# Patient Record
Sex: FEMALE | Race: WHITE | Employment: FULL TIME | URBAN - METROPOLITAN AREA
[De-identification: names, ages, dates, MRNs, and addresses within clinical notes are randomized per-mention and may not be internally consistent; named-entity substitution may affect disease eponyms.]

---

## 2017-01-05 ENCOUNTER — OFFICE VISIT (OUTPATIENT)
Dept: FAMILY MEDICINE | Facility: CLINIC | Age: 57
End: 2017-01-05
Payer: COMMERCIAL

## 2017-01-05 VITALS
BODY MASS INDEX: 24.75 KG/M2 | WEIGHT: 110 LBS | HEIGHT: 56 IN | HEART RATE: 82 BPM | DIASTOLIC BLOOD PRESSURE: 69 MMHG | TEMPERATURE: 97.9 F | SYSTOLIC BLOOD PRESSURE: 115 MMHG

## 2017-01-05 DIAGNOSIS — F41.1 GENERALIZED ANXIETY DISORDER: ICD-10-CM

## 2017-01-05 DIAGNOSIS — R73.09 ELEVATED GLUCOSE: ICD-10-CM

## 2017-01-05 DIAGNOSIS — R63.5 WEIGHT GAIN: Primary | ICD-10-CM

## 2017-01-05 LAB
GLUCOSE SERPL-MCNC: 195 MG/DL (ref 70–99)
TSH SERPL DL<=0.005 MIU/L-ACNC: 2.23 MU/L (ref 0.4–4)

## 2017-01-05 PROCEDURE — 84443 ASSAY THYROID STIM HORMONE: CPT | Mod: 90 | Performed by: FAMILY MEDICINE

## 2017-01-05 PROCEDURE — 99214 OFFICE O/P EST MOD 30 MIN: CPT | Performed by: FAMILY MEDICINE

## 2017-01-05 PROCEDURE — 36415 COLL VENOUS BLD VENIPUNCTURE: CPT | Performed by: FAMILY MEDICINE

## 2017-01-05 PROCEDURE — 99000 SPECIMEN HANDLING OFFICE-LAB: CPT | Performed by: FAMILY MEDICINE

## 2017-01-05 PROCEDURE — 82947 ASSAY GLUCOSE BLOOD QUANT: CPT | Mod: 90 | Performed by: FAMILY MEDICINE

## 2017-01-05 RX ORDER — FLUOXETINE 10 MG/1
10 CAPSULE ORAL DAILY
Qty: 30 CAPSULE | Refills: 1 | Status: SHIPPED | OUTPATIENT
Start: 2017-01-05 | End: 2017-03-06

## 2017-01-05 ASSESSMENT — ANXIETY QUESTIONNAIRES
7. FEELING AFRAID AS IF SOMETHING AWFUL MIGHT HAPPEN: NOT AT ALL
5. BEING SO RESTLESS THAT IT IS HARD TO SIT STILL: SEVERAL DAYS
GAD7 TOTAL SCORE: 9
2. NOT BEING ABLE TO STOP OR CONTROL WORRYING: MORE THAN HALF THE DAYS
6. BECOMING EASILY ANNOYED OR IRRITABLE: SEVERAL DAYS
1. FEELING NERVOUS, ANXIOUS, OR ON EDGE: MORE THAN HALF THE DAYS
3. WORRYING TOO MUCH ABOUT DIFFERENT THINGS: MORE THAN HALF THE DAYS

## 2017-01-05 ASSESSMENT — PATIENT HEALTH QUESTIONNAIRE - PHQ9: 5. POOR APPETITE OR OVEREATING: SEVERAL DAYS

## 2017-01-05 NOTE — PATIENT INSTRUCTIONS
MyFitnessPal or Lose it are two apps for your phone that are good for helping you track calories/exercise to help with weight loss.    Go to once a day on the buspar and then taper further if needed.      Start the fluoxetine 10 mg daily and see me in 1 month    Consider nutrition referral at [place] - check with insurance on coverage    Consider fitness classes to increase activity/calorie burning    Heather Mckenzie M.D.

## 2017-01-05 NOTE — Clinical Note
[facility]  [address]  [facility] [address]  Phone: [phone]    January 5, 2017    China Mckenzie  [address] [address] [facility] [address]              Dear Ms. Mckenzie,    Blood sugar elevated at 195.  Needs to return for a HgbA1c which measures three month average of blood sugar looking for diabetes.  This was a non-fasting specimen.       Component      Latest Ref Rng 1/5/2017   TSH      0.40 - 4.00 mU/L 2.23   Glucose      70 - 99 mg/dL 195 (H)           Sincerely,      Heather Mckenzie MD/ tg

## 2017-01-05 NOTE — NURSING NOTE
"Chief Complaint   Patient presents with     Weight Problem     weight gain     Medication Therapy Management       Initial /69 mmHg  Pulse 82  Temp(Src) 97.9  F (36.6  C) (Tympanic)  Ht 4' 8\" (1.422 m)  Wt 110 lb (49.896 kg)  BMI 24.68 kg/m2 Estimated body mass index is 24.68 kg/(m^2) as calculated from the following:    Height as of this encounter: 4' 8\" (1.422 m).    Weight as of this encounter: 110 lb (49.896 kg).  BP completed using cuff size: regular  Lesley Cassidy / Certified Medical Assistant......1/5/2017 9:48 AM    "

## 2017-01-05 NOTE — PROGRESS NOTES
"  SUBJECTIVE:                                                    China Mckenzie is a 56 year old female who presents to clinic today for the following health issues:      Anxiety Follow-Up    Status since last visit: Improved     Other associated symptoms:None    Complicating factors:   Significant life event: No   Current substance abuse: None  Depression symptoms: No  RON-7 SCORE 10/31/2014 12/14/2015 4/13/2016   Total Score 12 - -   Total Score - 11 5        GAD7         Amount of exercise or physical activity: 2-3 days/week for an average of 30-45 minutes    Problems taking medications regularly: No- but when tired can forget    Medication side effects: weight gain with the buspar  Diet: regular (no restrictions)    Patient is also wondering about her weight gain which she thinks is associated with her anxiety med, she would like to change medication maybe to prozac.    Body mass index is 24.68 kg/(m^2).    /69 mmHg  Pulse 82  Temp(Src) 97.9  F (36.6  C) (Tympanic)  Ht 4' 8\" (1.422 m)  Wt 110 lb (49.896 kg)  BMI 24.68 kg/m2  EXAM: GENERAL APPEARANCE ADULT: Alert, no acute distress  PSYCH: mentation appears normal., affect and mood normal    PHQ-9 SCORE 12/14/2015 4/13/2016 11/23/2016   Total Score - - -   Total Score 8 9 0     RON-7 SCORE 12/14/2015 4/13/2016 1/5/2017   Total Score - - -   Total Score 11 5 9         ASSESSMENT/PLAN:      ICD-10-CM    1. Weight gain R63.5 NUTRITION REFERRAL     TSH with free T4 reflex     Glucose   2. Generalized anxiety disorder F41.1 FLUoxetine (PROZAC) 10 MG capsule       Patient Instructions   MyFitnessPal or Lose it are two apps for your phone that are good for helping you track calories/exercise to help with weight loss.    Go to once a day on the buspar and then taper further if needed.      Start the fluoxetine 10 mg daily and see me in 1 month    Consider nutrition referral at [place] - check with insurance on coverage    Consider fitness classes to " increase activity/calorie burning    Heather Mckenzie M.D.

## 2017-01-06 ASSESSMENT — ANXIETY QUESTIONNAIRES: GAD7 TOTAL SCORE: 9

## 2017-01-31 ENCOUNTER — TELEPHONE (OUTPATIENT)
Dept: FAMILY MEDICINE | Facility: CLINIC | Age: 57
End: 2017-01-31

## 2017-01-31 NOTE — TELEPHONE ENCOUNTER
"Reason for call:  Patient reporting a symptom    Symptom or request: uti    Duration (how long have symptoms been present): this weekend    Have you been treated for this before? No    Additional comments: pt calling stating she thinks she has a uti    Phone Number patient can be reached at:  {[email or web address]:[phone]}    Best Time:  ***    Can we leave a detailed message on this number:  { :[phone]::\"YES\"}    Call taken on 1/31/2017 at 2:03 PM by Roz Mccray    "

## 2017-02-01 ENCOUNTER — OFFICE VISIT (OUTPATIENT)
Dept: FAMILY MEDICINE | Facility: CLINIC | Age: 57
End: 2017-02-01
Payer: COMMERCIAL

## 2017-02-01 VITALS
SYSTOLIC BLOOD PRESSURE: 119 MMHG | BODY MASS INDEX: 25.19 KG/M2 | TEMPERATURE: 97.9 F | HEIGHT: 56 IN | OXYGEN SATURATION: 96 % | HEART RATE: 79 BPM | DIASTOLIC BLOOD PRESSURE: 63 MMHG | WEIGHT: 112 LBS

## 2017-02-01 DIAGNOSIS — R82.90 NONSPECIFIC FINDING ON EXAMINATION OF URINE: Primary | ICD-10-CM

## 2017-02-01 DIAGNOSIS — R30.0 DYSURIA: ICD-10-CM

## 2017-02-01 LAB
ALBUMIN UR-MCNC: NEGATIVE MG/DL
APPEARANCE UR: ABNORMAL
BACTERIA #/AREA URNS HPF: ABNORMAL /HPF
BILIRUB UR QL STRIP: NEGATIVE
COLOR UR AUTO: YELLOW
GLUCOSE UR STRIP-MCNC: NEGATIVE MG/DL
HGB UR QL STRIP: ABNORMAL
KETONES UR STRIP-MCNC: NEGATIVE MG/DL
LEUKOCYTE ESTERASE UR QL STRIP: ABNORMAL
NITRATE UR QL: NEGATIVE
NON-SQ EPI CELLS #/AREA URNS LPF: ABNORMAL /LPF
PH UR STRIP: 5 PH (ref 5–7)
RBC #/AREA URNS AUTO: ABNORMAL /HPF (ref 0–2)
SP GR UR STRIP: 1.02 (ref 1–1.03)
URN SPEC COLLECT METH UR: ABNORMAL
UROBILINOGEN UR STRIP-ACNC: 0.2 EU/DL (ref 0.2–1)
WBC #/AREA URNS AUTO: ABNORMAL /HPF (ref 0–2)

## 2017-02-01 PROCEDURE — 87086 URINE CULTURE/COLONY COUNT: CPT | Performed by: FAMILY MEDICINE

## 2017-02-01 PROCEDURE — 99213 OFFICE O/P EST LOW 20 MIN: CPT | Performed by: FAMILY MEDICINE

## 2017-02-01 PROCEDURE — 87186 SC STD MICRODIL/AGAR DIL: CPT | Performed by: FAMILY MEDICINE

## 2017-02-01 PROCEDURE — 87088 URINE BACTERIA CULTURE: CPT | Performed by: FAMILY MEDICINE

## 2017-02-01 PROCEDURE — 81001 URINALYSIS AUTO W/SCOPE: CPT | Performed by: FAMILY MEDICINE

## 2017-02-01 RX ORDER — SULFAMETHOXAZOLE/TRIMETHOPRIM 800-160 MG
1 TABLET ORAL 2 TIMES DAILY
Qty: 14 TABLET | Refills: 0 | Status: SHIPPED | OUTPATIENT
Start: 2017-02-01 | End: 2017-02-01

## 2017-02-01 RX ORDER — CIPROFLOXACIN 500 MG/1
500 TABLET, FILM COATED ORAL 2 TIMES DAILY
Qty: 14 TABLET | Refills: 0 | Status: SHIPPED | OUTPATIENT
Start: 2017-02-01 | End: 2017-03-30

## 2017-02-01 ASSESSMENT — PAIN SCALES - GENERAL: PAINLEVEL: SEVERE PAIN (7)

## 2017-02-01 NOTE — MR AVS SNAPSHOT
"              After Visit Summary   2017    China Mckenzie    MRN: [id number]           Patient Information     Date Of Birth          1960        Visit Information        Provider Department      2017 3:40 PM Sreekanth Wiley MD [facility]        Today's Diagnoses     Nonspecific finding on examination of urine    -  1     Dysuria            Follow-ups after your visit        Who to contact     If you have questions or need follow up information about today's clinic visit or your schedule please contact [facility] directly at [phone].  Normal or non-critical lab and imaging results will be communicated to you by [employer], letter or phone within 4 business days after the clinic has received the results. If you do not hear from us within 7 days, please contact the clinic through [employer] or phone. If you have a critical or abnormal lab result, we will notify you by phone as soon as possible.  Submit refill requests through [employer] or call your pharmacy and they will forward the refill request to us. Please allow 3 business days for your refill to be completed.          Additional Information About Your Visit        MyChart Information     [employer] lets you send messages to your doctor, view your test results, renew your prescriptions, schedule appointments and more. To sign up, go to [email or web address] . Click on \"Log in\" on the left side of the screen, which will take you to the Welcome page. Then click on \"Sign up Now\" on the right side of the page.     You will be asked to enter the access code listed below, as well as some personal information. Please follow the directions to create your username and password.     Your access code is: [email or web address]  Expires: 2017 10:16 AM     Your access code will  in 90 days. If you need help or a new code, please call your [facility] or [phone].        Care EveryWhere ID     This is your " "Care EveryWhere ID. This could be used by other organizations to access your [facility] medical records  [id number]        Your Vitals Were     Pulse Temperature Height BMI (Body Mass Index) Pulse Oximetry Breastfeeding?    79 97.9  F (36.6  C) (Tympanic) 4' 8\" (1.422 m) 25.12 kg/m2 96% No       Blood Pressure from Last 3 Encounters:   02/01/17 119/63   01/05/17 115/69   11/23/16 115/71    Weight from Last 3 Encounters:   02/01/17 112 lb (50.803 kg)   01/05/17 110 lb (49.896 kg)   11/23/16 109 lb (49.442 kg)              We Performed the Following     *UA reflex to Microscopic and Culture ([facility] (except [place] and [place])     Urine Culture Aerobic Bacterial     Urine Microscopic          Today's Medication Changes          These changes are accurate as of: 2/1/17  4:17 PM.  If you have any questions, ask your nurse or doctor.               Start taking these medicines.        Dose/Directions    sulfamethoxazole-trimethoprim 800-160 MG per tablet   Commonly known as:  BACTRIM DS/SEPTRA DS   Used for:  Dysuria   Started by:  Sreekanth Wiley MD        Dose:  1 tablet   Take 1 tablet by mouth 2 times daily   Quantity:  14 tablet   Refills:  0            Where to get your medicines      [facility] [address]  [address], [place] [address]    Hours:  JO[facility]N [place] Phone:  [phone]    - sulfamethoxazole-trimethoprim 800-160 MG per tablet             Primary Care Provider Office Phone # Fax #    Heather Mckenzie [phone]       [facility] [address] [facility] [address]        Thank you!     Thank you for choosing [facility]  for your care. Our goal is always to provide you with excellent care. Hearing back from our patients is one way we can continue to improve our services. Please take a few minutes to complete the written " survey that you may receive in the mail after your visit with us. Thank you!             Your Updated Medication List - Protect others around you: Learn how to safely use, store and throw away your medicines at www.disposemymeds.org.          This list is accurate as of: 2/1/17  4:17 PM.  Always use your most recent med list.                   Brand Name Dispense Instructions for use    CALCIUM PO      Take 600 mg by mouth       cyclobenzaprine 10 MG tablet    FLEXERIL    30 tablet    Take 1 tablet (10 mg) by mouth 3 times daily as needed for muscle spasms        ULTRA STRENGTH 2000 UNITS Caps   Generic drug:  cholecalciferol      Take 2,000 Units by mouth       estradiol 1 MG tablet    ESTRACE    90 tablet    Take 1 tablet (1 mg) by mouth daily       ESTROVEN PO          FLUoxetine 10 MG capsule    PROzac    30 capsule    Take 1 capsule (10 mg) by mouth daily       gabapentin 300 MG capsule    NEURONTIN    270 capsule    Take 1 capsule (300 mg) by mouth 3 times daily       HYDROcodone-acetaminophen 5-325 MG per tablet   Start taking on:  2/18/2017    NORCO    60 tablet    Take 1 tablet by mouth 2 times daily as needed for other (Moderate to Severe Pain)       medroxyPROGESTERone 2.5 MG tablet    PROVERA    90 tablet    Take 1 tablet (2.5 mg) by mouth daily       sulfamethoxazole-trimethoprim 800-160 MG per tablet    BACTRIM DS/SEPTRA DS    14 tablet    Take 1 tablet by mouth 2 times daily       SUMAtriptan 100 MG tablet    IMITREX    18 tablet    Take 1 tablet (100 mg) by mouth at onset of headache for migraine May repeat in 2 hours if needed: max 2/day; average number of headaches monthly 20

## 2017-02-01 NOTE — NURSING NOTE
"Chief Complaint   Patient presents with     UTI     painful urination x 4 days       Initial /63 mmHg  Pulse 79  Temp(Src) 97.9  F (36.6  C) (Tympanic)  Ht 4' 8\" (1.422 m)  Wt 112 lb (50.803 kg)  BMI 25.12 kg/m2  SpO2 96%  Breastfeeding? No Estimated body mass index is 25.12 kg/(m^2) as calculated from the following:    Height as of this encounter: 4' 8\" (1.422 m).    Weight as of this encounter: 112 lb (50.803 kg).  BP completed using cuff size: josue PARIS      "

## 2017-02-01 NOTE — PROGRESS NOTES
SUBJECTIVE:                                                    China Mckenzie is a 56 year old female who presents to clinic today for the following health issues:    He has had 4 days of burning on urination. The last 2 days have been improving.  She has no new type of back pain. No fever chills or sweats. No nausea or vomiting.      URINARY TRACT SYMPTOMS     Onset: 4 days     Description:   Painful urination (Dysuria): YES  Blood in urine (Hematuria): no   Delay in urine (Hesitency): YES    Intensity: moderate, severe    Progression of Symptoms:  worsening    Accompanying Signs & Symptoms:  Fever/chills: no   Flank pain no   Nausea and vomiting: no   Any vaginal symptoms: none and vaginal itching  Abdominal/Pelvic Pain: no    History:   History of frequent UTI's: no   History of kidney stones: no   Sexually Active: no   Possibility of pregnancy: No    Precipitating factors:   Drinks a lot of water         Therapies Tried and outcome: Increase fluid intake            Problem list and histories reviewed & adjusted, as indicated.  Additional history: as documented    Medical, surgical, family, social histories, allergies and meds reviewed and updated.    ROS:  General: No change in weight, sleep or appetite.  Normal energy.  No fever or chills  Resp: No coughing, wheezing or shortness of breath  CV: No chest pains or palpitations  GI: No nausea, vomiting,  heartburn, abdominal pain, diarrhea, constipation or change in bowel habits    Exam:  GENERAL APPEARANCE ADULT: Alert, no acute distress  ABDOMEN: soft, no organomegaly, masses or tenderness  MS: No CVA tenderness.    ASSESSMENT:  (R82.90) Nonspecific finding on examination of urine  (primary encounter diagnosis)  Comment:   Plan: Urine Culture Aerobic Bacterial            (R30.0) Dysuria  Comment:   Plan: *UA reflex to Microscopic and Culture         ([facility] (except         [place] and [place]), Urine Microscopic,          ciprofloxacin (CIPRO) 500 MG tablet,         DISCONTINUED: sulfamethoxazole-trimethoprim         (BACTRIM DS/SEPTRA DS) 800-160 MG per tablet              PLAN:  Orders Placed This Encounter     *UA reflex to Microscopic and Culture ([facility] (except [place] and [place])     Urine Microscopic     DISCONTD: sulfamethoxazole-trimethoprim (BACTRIM DS/SEPTRA DS) 800-160 MG per tablet     ciprofloxacin (CIPRO) 500 MG tablet   Recheck in 48 hours if not improving.  Recheck in clinic as needed.      There are no Patient Instructions on file for this visit.      Sreekanth Wiley

## 2017-02-02 ENCOUNTER — TRANSFERRED RECORDS (OUTPATIENT)
Dept: HEALTH INFORMATION MANAGEMENT | Facility: CLINIC | Age: 57
End: 2017-02-02

## 2017-02-04 LAB
BACTERIA SPEC CULT: ABNORMAL
MICRO REPORT STATUS: ABNORMAL
MICROORGANISM SPEC CULT: ABNORMAL
SPECIMEN SOURCE: ABNORMAL

## 2017-03-06 DIAGNOSIS — F41.1 GENERALIZED ANXIETY DISORDER: ICD-10-CM

## 2017-03-06 RX ORDER — FLUOXETINE 10 MG/1
10 CAPSULE ORAL DAILY
Qty: 30 CAPSULE | Refills: 0 | Status: SHIPPED | OUTPATIENT
Start: 2017-03-06 | End: 2017-03-30 | Stop reason: DRUGHIGH

## 2017-03-06 NOTE — TELEPHONE ENCOUNTER
Routing refill request to provider for review/approval because:  Patient needs to be seen because:  Started medication on 1/5/17, was to follow up in one month    Thank you  Etelvina CRAFT RN

## 2017-03-06 NOTE — TELEPHONE ENCOUNTER
Fluoxetine     Last Written Prescription Date: 01/05/2017  Last Fill Quantity: 30, # refills: 1  Last Office Visit with [facility] primary care provider:  02/01/2017        Last PHQ-9 score on record=   PHQ-9 SCORE 11/23/2016   Total Score -   Total Score 0     PHQ-9 SCORE 12/14/2015 4/13/2016 11/23/2016   Total Score - - -   Total Score 8 9 0     RON-7 SCORE 12/14/2015 4/13/2016 1/5/2017   Total Score - - -   Total Score 11 5 9       Jason YARBROUGH (R)

## 2017-03-20 ENCOUNTER — TELEPHONE (OUTPATIENT)
Dept: FAMILY MEDICINE | Facility: CLINIC | Age: 57
End: 2017-03-20

## 2017-03-20 DIAGNOSIS — M12.9 ARTHROPATHY: ICD-10-CM

## 2017-03-20 RX ORDER — HYDROCODONE BITARTRATE AND ACETAMINOPHEN 5; 325 MG/1; MG/1
1 TABLET ORAL 2 TIMES DAILY PRN
Qty: 60 TABLET | Refills: 0 | Status: SHIPPED | OUTPATIENT
Start: 2017-03-20 | End: 2017-03-30

## 2017-03-20 NOTE — TELEPHONE ENCOUNTER
Pt would like refill to last until her appt 3/30  Norco      Last Written Prescription Date: 2/18/2017  Last Fill Quantity: 60,  # refills: 0   Last Office Visit with [facility], [facility] or [facility] prescribing provider: 2/1/2017                                         Next 5 appointments (look out 90 days)     Mar 30, 2017  7:40 AM CDT   SHORT with Heather Mckenzie MD   [facility] [place] ([facility])    [address] [facility] [address]   [phone]

## 2017-03-20 NOTE — TELEPHONE ENCOUNTER
Patient is followed by HEATHER WAYNE for ongoing prescription of narcotic pain medicine.  Med: hydrocodone/apap 5/325  Maximum use per month: 60  Expected duration: lifetime  Narcotic agreement on file: YES 2/18/2014    Clinic visit recommended: Q 3 months    Due for clinic visit. Visit scheduled 3/30/2017 - refilled x 1.    Heather Wayne M.D.

## 2017-03-20 NOTE — TELEPHONE ENCOUNTER
Pt's finished with script today. Is asking for a refill.   Has appt in clinic on 3/30/17.   Please advise   Thank you   Samara BRAN RN      Disp Refills Start End ISABEL    HYDROcodone-acetaminophen (NORCO) 5-325 MG per tablet 60 tablet 0 2/18/2017 3/20/2017 No   Sig: Take 1 tablet by mouth 2 times daily as needed for other (Moderate to Severe Pain)     Dx: Arthropathy

## 2017-03-30 ENCOUNTER — OFFICE VISIT (OUTPATIENT)
Dept: FAMILY MEDICINE | Facility: CLINIC | Age: 57
End: 2017-03-30
Payer: COMMERCIAL

## 2017-03-30 VITALS
WEIGHT: 107 LBS | BODY MASS INDEX: 24.07 KG/M2 | SYSTOLIC BLOOD PRESSURE: 114 MMHG | HEIGHT: 56 IN | HEART RATE: 74 BPM | DIASTOLIC BLOOD PRESSURE: 70 MMHG | OXYGEN SATURATION: 100 % | RESPIRATION RATE: 18 BRPM

## 2017-03-30 DIAGNOSIS — G89.4 CHRONIC PAIN SYNDROME: ICD-10-CM

## 2017-03-30 DIAGNOSIS — M62.830 BACK MUSCLE SPASM: ICD-10-CM

## 2017-03-30 DIAGNOSIS — M12.9 ARTHROPATHY: ICD-10-CM

## 2017-03-30 DIAGNOSIS — G43.009 MIGRAINE WITHOUT AURA AND WITHOUT STATUS MIGRAINOSUS, NOT INTRACTABLE: ICD-10-CM

## 2017-03-30 DIAGNOSIS — F41.1 GENERALIZED ANXIETY DISORDER: ICD-10-CM

## 2017-03-30 PROCEDURE — 99214 OFFICE O/P EST MOD 30 MIN: CPT | Performed by: FAMILY MEDICINE

## 2017-03-30 RX ORDER — HYDROCODONE BITARTRATE AND ACETAMINOPHEN 5; 325 MG/1; MG/1
1 TABLET ORAL 2 TIMES DAILY PRN
Qty: 60 TABLET | Refills: 0 | Status: SHIPPED | OUTPATIENT
Start: 2017-04-20 | End: 2018-10-15

## 2017-03-30 RX ORDER — HYDROCODONE BITARTRATE AND ACETAMINOPHEN 5; 325 MG/1; MG/1
1 TABLET ORAL 2 TIMES DAILY PRN
Qty: 60 TABLET | Refills: 0 | Status: SHIPPED | OUTPATIENT
Start: 2017-06-19 | End: 2017-07-28

## 2017-03-30 RX ORDER — GABAPENTIN 300 MG/1
600 CAPSULE ORAL 3 TIMES DAILY
Qty: 540 CAPSULE | Refills: 1 | Status: SHIPPED | OUTPATIENT
Start: 2017-03-30 | End: 2017-07-28

## 2017-03-30 RX ORDER — CYCLOBENZAPRINE HCL 10 MG
10 TABLET ORAL 3 TIMES DAILY PRN
Qty: 30 TABLET | Refills: 5 | Status: SHIPPED | OUTPATIENT
Start: 2017-03-30 | End: 2017-10-11

## 2017-03-30 RX ORDER — FLUOXETINE 10 MG/1
10 CAPSULE ORAL DAILY
Qty: 90 CAPSULE | Refills: 3 | Status: CANCELLED | OUTPATIENT
Start: 2017-03-30

## 2017-03-30 RX ORDER — SUMATRIPTAN 100 MG/1
100 TABLET, FILM COATED ORAL
Qty: 18 TABLET | Refills: 5 | Status: SHIPPED | OUTPATIENT
Start: 2017-03-30 | End: 2017-07-28

## 2017-03-30 RX ORDER — HYDROCODONE BITARTRATE AND ACETAMINOPHEN 5; 325 MG/1; MG/1
1 TABLET ORAL EVERY 6 HOURS PRN
Qty: 60 TABLET | Refills: 0 | Status: SHIPPED | OUTPATIENT
Start: 2017-05-20 | End: 2017-06-19

## 2017-03-30 NOTE — NURSING NOTE
"Chief Complaint   Patient presents with     Recheck Medication     Health Maintenance     will schedule pap       Initial /70  Pulse 74  Resp 18  Ht 4' 8\" (1.422 m)  Wt 107 lb (48.5 kg)  SpO2 100%  Breastfeeding? No  BMI 23.99 kg/m2 Estimated body mass index is 23.99 kg/(m^2) as calculated from the following:    Height as of this encounter: 4' 8\" (1.422 m).    Weight as of this encounter: 107 lb (48.5 kg).  Medication Reconciliation: complete    "

## 2017-03-30 NOTE — PROGRESS NOTES
"  SUBJECTIVE:                                                    China Mckenzie is a 56 year old female who presents to clinic today for the following health issues:    Chief Complaint   Patient presents with     Recheck Medication       Chronic Pain Follow-Up       Type / Location of Pain: general arthralgias  Analgesia/pain control:       Recent changes:  Improved with the gabapentin      Overall control: Comfortably manageable  Activity level/function:      Daily activities:  Able to do moderate activities    Work:  Able to work part time without limitations  Adverse effects:  No  Adherance    Taking medication as directed?  Yes    Participating in other treatments: not applicable  Risk Factors:    Sleep:  Fair    Mood/anxiety:  worsened    Recent family or social stressors:  none noted    Other aggravating factors: none  PHQ-9 SCORE 12/14/2015 4/13/2016 11/23/2016   Total Score - - -   Total Score 8 9 0     RON-7 SCORE 12/14/2015 4/13/2016 1/5/2017   Total Score - - -   Total Score 11 5 9     Encounter-Level CSA:     There are no encounter-level csa.           Wt Readings from Last 5 Encounters:   03/30/17 107 lb (48.5 kg)   02/01/17 112 lb (50.8 kg)   01/05/17 110 lb (49.9 kg)   11/23/16 109 lb (49.4 kg)   09/19/16 106 lb 14.4 oz (48.5 kg)     Depression Followup    Status since last visit: Worsened      See PHQ-9 for current symptoms.  Other associated symptoms: None    Complicating factors:   Significant life event:  No   Current substance abuse:  None  Anxiety or Panic symptoms:  No    PHQ-9  English PHQ-9   Any Language              Problem list and histories reviewed & adjusted, as indicated.  Additional history: as documented    /70  Pulse 74  Resp 18  Ht 4' 8\" (1.422 m)  Wt 107 lb (48.5 kg)  SpO2 100%  Breastfeeding? No  BMI 23.99 kg/m2  EXAM: GENERAL APPEARANCE: Alert, no acute distress  RESP: lungs clear to auscultation   CV: normal rate, regular rhythm, no murmur or gallop  ABDOMEN: " soft, no organomegaly, masses or tenderness  PSYCH: mentation appears normal., affect and mood normal    PHQ-9 SCORE 4/13/2016 11/23/2016 3/30/2017   Total Score - - -   Total Score 9 0 15     RON-7 SCORE 12/14/2015 4/13/2016 1/5/2017   Total Score - - -   Total Score 11 5 9       ASSESSMENT/PLAN:      ICD-10-CM    1. Arthropathy M12.9 HYDROcodone-acetaminophen (NORCO) 5-325 MG per tablet     HYDROcodone-acetaminophen (NORCO) 5-325 MG per tablet     HYDROcodone-acetaminophen (NORCO) 5-325 MG per tablet   2. Generalized anxiety disorder F41.1 FLUoxetine (PROZAC) 20 MG capsule   3. Chronic pain syndrome G89.4 gabapentin (NEURONTIN) 300 MG capsule   4. Back muscle spasm M62.830 cyclobenzaprine (FLEXERIL) 10 MG tablet   5. Migraine without aura and without status migrainosus, not intractable G43.009 SUMAtriptan (IMITREX) 100 MG tablet       Patient Instructions     Increase fluoxetine to 20 mg daily, this can be adjusted further as needed    Change your Gabapentin dose a bit - take 600 mg at bedtime and 300 mg in the morning for three days, then increase to 300 mg twice a day and 600 mg at bedtime for three days  Then increase to 600 mg in the morning, 300 mg early afternoon and 600 mg at bedtime for 3 days  Then increase to 600 mg three times daily     Continue to try to take less of the Norco and have those prescriptions last longer and longer.  Ideally we'd get you off of that completely.    Heather Mckenzie M.D.      Thank you for choosing [facility].  You may be receiving a survey in the mail from [facility] regarding your visit today.  Please take a few minutes to complete and return the survey to let us know how we are doing.      Our Clinic hours are:  Mondays    7:20 am - 7 pm  Tues -  Fri  7:20 am - 5 pm    Clinic Phone: [phone]    The clinic lab opens at 7:30 am Mon - Fri and appointments are required.    [facility]. [phone]  Monday-Thursday 8 am - 7pm  Tues/Wed/Fri 8  am - 5:30 pm

## 2017-03-30 NOTE — PATIENT INSTRUCTIONS
Increase fluoxetine to 20 mg daily, this can be adjusted further as needed    Change your Gabapentin dose a bit - take 600 mg at bedtime and 300 mg in the morning for three days, then increase to 300 mg twice a day and 600 mg at bedtime for three days  Then increase to 600 mg in the morning, 300 mg early afternoon and 600 mg at bedtime for 3 days  Then increase to 600 mg three times daily     Continue to try to take less of the Norco and have those prescriptions last longer and longer.  Ideally we'd get you off of that completely.    Heather Mckenzie M.D.      Thank you for choosing [facility].  You may be receiving a survey in the mail from [employer] [facility] regarding your visit today.  Please take a few minutes to complete and return the survey to let us know how we are doing.      Our Clinic hours are:  Mondays    7:20 am - 7 pm  Tues -  Fri  7:20 am - 5 pm    Clinic Phone: [phone]    The clinic lab opens at 7:30 am Mon - Fri and appointments are required.    [facility]. [phone]  Monday-Thursday 8 am - 7pm  Tues/Wed/Fri 8 am - 5:30 pm

## 2017-03-30 NOTE — MR AVS SNAPSHOT
After Visit Summary   3/30/2017    China Mckenzie    MRN: [id number]           Patient Information     Date Of Birth          1960        Visit Information        Provider Department      3/30/2017 7:40 AM Heather Mckenzie MD [facility]        Today's Diagnoses     Arthropathy        Generalized anxiety disorder        Chronic pain syndrome        Back muscle spasm        Migraine without aura and without status migrainosus, not intractable          Care Instructions      Increase fluoxetine to 20 mg daily, this can be adjusted further as needed    Change your Gabapentin dose a bit - take 600 mg at bedtime and 300 mg in the morning for three days, then increase to 300 mg twice a day and 600 mg at bedtime for three days  Then increase to 600 mg in the morning, 300 mg early afternoon and 600 mg at bedtime for 3 days  Then increase to 600 mg three times daily     Continue to try to take less of the Norco and have those prescriptions last longer and longer.  Ideally we'd get you off of that completely.    Heather Mckenzie M.D.      Thank you for choosing [facility].  You may be receiving a survey in the mail from [facility] regarding your visit today.  Please take a few minutes to complete and return the survey to let us know how we are doing.      Our Clinic hours are:  Mondays    7:20 am - 7 pm  Tues -  Fri  7:20 am - 5 pm    Clinic Phone: [phone]    The clinic lab opens at 7:30 am Mon - Fri and appointments are required.    [facility]  Ph. [phone]  Monday-Thursday 8 am - 7pm  Tues/Wed/Fri 8 am - 5:30 pm               Follow-ups after your visit        Who to contact     If you have questions or need follow up information about today's clinic visit or your schedule please contact [facility] directly at [phone].  Normal or non-critical lab and imaging results will be communicated to you by MyChart, letter or phone  "within 4 business days after the clinic has received the results. If you do not hear from us within 7 days, please contact the clinic through "[employer]" or phone. If you have a critical or abnormal lab result, we will notify you by phone as soon as possible.  Submit refill requests through "[employer]" or call your pharmacy and they will forward the refill request to us. Please allow 3 business days for your refill to be completed.          Additional Information About Your Visit        "[employer]" Information     "[employer]" lets you send messages to your doctor, view your test results, renew your prescriptions, schedule appointments and more. To sign up, go to [email or web address]" . Click on \"Log in\" on the left side of the screen, which will take you to the Welcome page. Then click on \"Sign up Now\" on the right side of the page.     You will be asked to enter the access code listed below, as well as some personal information. Please follow the directions to create your username and password.     Your access code is: [email or web address]  Expires: 2017 11:16 AM     Your access code will  in 90 days. If you need help or a new code, please call your [facility] clinic or [phone].        Care EveryWhere ID     This is your Care EveryWhere ID. This could be used by other organizations to access your [facility] medical records  [id number]        Your Vitals Were     Pulse Respirations Height Pulse Oximetry Breastfeeding? BMI (Body Mass Index)    74 18 4' 8\" (1.422 m) 100% No 23.99 kg/m2       Blood Pressure from Last 3 Encounters:   17 114/70   17 119/63   17 115/69    Weight from Last 3 Encounters:   17 107 lb (48.5 kg)   17 112 lb (50.8 kg)   17 110 lb (49.9 kg)              Today, you had the following     No orders found for display         Today's Medication Changes          These changes are accurate as of: 3/30/17  8:07 AM.  If you have any questions, ask your nurse or doctor.          "      These medicines have changed or have updated prescriptions.        Dose/Directions    FLUoxetine 20 MG capsule   Commonly known as:  PROzac   This may have changed:    - medication strength  - how much to take  - additional instructions   Used for:  Generalized anxiety disorder   Changed by:  Heather Mckenzie MD        Dose:  20 mg   Take 1 capsule (20 mg) by mouth daily   Quantity:  90 capsule   Refills:  1       gabapentin 300 MG capsule   Commonly known as:  NEURONTIN   This may have changed:  how much to take   Used for:  Chronic pain syndrome   Changed by:  Heather Mckenzie MD        Dose:  600 mg   Take 2 capsules (600 mg) by mouth 3 times daily   Quantity:  540 capsule   Refills:  1       * HYDROcodone-acetaminophen 5-325 MG per tablet   Commonly known as:  NORCO   This may have changed:  You were already taking a medication with the same name, and this prescription was added. Make sure you understand how and when to take each.   Used for:  Arthropathy   Changed by:  Heather Mckenzie MD        Dose:  1 tablet   Start taking on:  4/20/2017   Take 1 tablet by mouth 2 times daily as needed for other (Moderate to Severe Pain)   Quantity:  60 tablet   Refills:  0       * HYDROcodone-acetaminophen 5-325 MG per tablet   Commonly known as:  NORCO   This may have changed:  You were already taking a medication with the same name, and this prescription was added. Make sure you understand how and when to take each.   Used for:  Arthropathy   Changed by:  Heather Mckenzie MD        Dose:  1 tablet   Start taking on:  5/20/2017   Take 1 tablet by mouth every 6 hours as needed for moderate to severe pain   Quantity:  60 tablet   Refills:  0       * HYDROcodone-acetaminophen 5-325 MG per tablet   Commonly known as:  NORCO   This may have changed:  Another medication with the same name was added. Make sure you understand how and when to take each.   Used for:  Arthropathy   Changed by:  Heather Mckenzie MD        Dose:   1 tablet   Start taking on:  6/19/2017   Take 1 tablet by mouth 2 times daily as needed for other (Moderate to Severe Pain)   Quantity:  60 tablet   Refills:  0       SUMAtriptan 100 MG tablet   Commonly known as:  IMITREX   This may have changed:  additional instructions   Used for:  Migraine without aura and without status migrainosus, not intractable   Changed by:  Heather Mckenzie MD        Dose:  100 mg   Take 1 tablet (100 mg) by mouth at onset of headache for migraine May repeat in 2 hours if needed: max 2/day   Quantity:  18 tablet   Refills:  5       * Notice:  This list has 3 medication(s) that are the same as other medications prescribed for you. Read the directions carefully, and ask your doctor or other care provider to review them with you.         Where to get your medicines      These medications were sent to [facility] [place] - [address]  [address], [place] [address]    Hours:  [facility] [place] Phone:  [phone]     cyclobenzaprine 10 MG tablet    FLUoxetine 20 MG capsule    gabapentin 300 MG capsule    SUMAtriptan 100 MG tablet         Some of these will need a paper prescription and others can be bought over the counter.  Ask your nurse if you have questions.     Bring a paper prescription for each of these medications     HYDROcodone-acetaminophen 5-325 MG per tablet    HYDROcodone-acetaminophen 5-325 MG per tablet    HYDROcodone-acetaminophen 5-325 MG per tablet                Primary Care Provider Office Phone # Fax #    Heather Mckenzie [phone]       [facility] [address] [place] [address]        Thank you!     Thank you for choosing [facility]  for your care. Our goal is always to provide you with excellent care. Hearing back from our patients is one way we can continue to improve our services. Please take a few minutes to complete the written survey that you may receive in  the mail after your visit with us. Thank you!             Your Updated Medication List - Protect others around you: Learn how to safely use, store and throw away your medicines at www.disposemymeds.org.          This list is accurate as of: 3/30/17  8:07 AM.  Always use your most recent med list.                   Brand Name Dispense Instructions for use    CALCIUM PO      Take 600 mg by mouth       cyclobenzaprine 10 MG tablet    FLEXERIL    30 tablet    Take 1 tablet (10 mg) by mouth 3 times daily as needed for muscle spasms        ULTRA STRENGTH 2000 UNITS Caps   Generic drug:  cholecalciferol      Take 2,000 Units by mouth       estradiol 1 MG tablet    ESTRACE    90 tablet    Take 1 tablet (1 mg) by mouth daily       ESTROVEN PO          FLUoxetine 20 MG capsule    PROzac    90 capsule    Take 1 capsule (20 mg) by mouth daily       gabapentin 300 MG capsule    NEURONTIN    540 capsule    Take 2 capsules (600 mg) by mouth 3 times daily       * HYDROcodone-acetaminophen 5-325 MG per tablet   Start taking on:  4/20/2017    NORCO    60 tablet    Take 1 tablet by mouth 2 times daily as needed for other (Moderate to Severe Pain)       * HYDROcodone-acetaminophen 5-325 MG per tablet   Start taking on:  5/20/2017    NORCO    60 tablet    Take 1 tablet by mouth every 6 hours as needed for moderate to severe pain       * HYDROcodone-acetaminophen 5-325 MG per tablet   Start taking on:  6/19/2017    NORCO    60 tablet    Take 1 tablet by mouth 2 times daily as needed for other (Moderate to Severe Pain)       medroxyPROGESTERone 2.5 MG tablet    PROVERA    90 tablet    Take 1 tablet (2.5 mg) by mouth daily       SUMAtriptan 100 MG tablet    IMITREX    18 tablet    Take 1 tablet (100 mg) by mouth at onset of headache for migraine May repeat in 2 hours if needed: max 2/day       TESTOSTERONE IN VANICREAM 2% CREAM      Apply topically every morning       * Notice:  This list has 3 medication(s) that are the same as other  medications prescribed for you. Read the directions carefully, and ask your doctor or other care provider to review them with you.

## 2017-03-31 ASSESSMENT — PATIENT HEALTH QUESTIONNAIRE - PHQ9: SUM OF ALL RESPONSES TO PHQ QUESTIONS 1-9: 15

## 2017-05-03 ENCOUNTER — OFFICE VISIT (OUTPATIENT)
Dept: FAMILY MEDICINE | Facility: CLINIC | Age: 57
End: 2017-05-03
Payer: COMMERCIAL

## 2017-05-03 VITALS
BODY MASS INDEX: 23.62 KG/M2 | DIASTOLIC BLOOD PRESSURE: 77 MMHG | RESPIRATION RATE: 18 BRPM | WEIGHT: 105 LBS | HEIGHT: 56 IN | SYSTOLIC BLOOD PRESSURE: 128 MMHG | HEART RATE: 75 BPM

## 2017-05-03 DIAGNOSIS — Z00.00 ROUTINE GENERAL MEDICAL EXAMINATION AT A HEALTH CARE FACILITY: Primary | ICD-10-CM

## 2017-05-03 DIAGNOSIS — G89.4 CHRONIC PAIN SYNDROME: ICD-10-CM

## 2017-05-03 DIAGNOSIS — Z11.59 NEED FOR HEPATITIS C SCREENING TEST: ICD-10-CM

## 2017-05-03 DIAGNOSIS — R73.09 ELEVATED GLUCOSE: ICD-10-CM

## 2017-05-03 DIAGNOSIS — Z23 NEED FOR VACCINATION: ICD-10-CM

## 2017-05-03 DIAGNOSIS — Z12.4 SCREENING FOR CERVICAL CANCER: ICD-10-CM

## 2017-05-03 LAB — HBA1C MFR BLD: 5.4 % (ref 4.3–6)

## 2017-05-03 PROCEDURE — G0145 SCR C/V CYTO,THINLAYER,RESCR: HCPCS | Performed by: FAMILY MEDICINE

## 2017-05-03 PROCEDURE — 83036 HEMOGLOBIN GLYCOSYLATED A1C: CPT | Performed by: FAMILY MEDICINE

## 2017-05-03 PROCEDURE — 99396 PREV VISIT EST AGE 40-64: CPT | Mod: 25 | Performed by: FAMILY MEDICINE

## 2017-05-03 PROCEDURE — 90471 IMMUNIZATION ADMIN: CPT | Performed by: FAMILY MEDICINE

## 2017-05-03 PROCEDURE — 86803 HEPATITIS C AB TEST: CPT | Performed by: FAMILY MEDICINE

## 2017-05-03 PROCEDURE — 90715 TDAP VACCINE 7 YRS/> IM: CPT | Performed by: FAMILY MEDICINE

## 2017-05-03 PROCEDURE — 36415 COLL VENOUS BLD VENIPUNCTURE: CPT | Performed by: FAMILY MEDICINE

## 2017-05-03 PROCEDURE — 87624 HPV HI-RISK TYP POOLED RSLT: CPT | Performed by: FAMILY MEDICINE

## 2017-05-03 ASSESSMENT — ANXIETY QUESTIONNAIRES
3. WORRYING TOO MUCH ABOUT DIFFERENT THINGS: SEVERAL DAYS
GAD7 TOTAL SCORE: 5
2. NOT BEING ABLE TO STOP OR CONTROL WORRYING: SEVERAL DAYS
6. BECOMING EASILY ANNOYED OR IRRITABLE: NOT AT ALL
5. BEING SO RESTLESS THAT IT IS HARD TO SIT STILL: SEVERAL DAYS
7. FEELING AFRAID AS IF SOMETHING AWFUL MIGHT HAPPEN: NOT AT ALL
1. FEELING NERVOUS, ANXIOUS, OR ON EDGE: SEVERAL DAYS

## 2017-05-03 ASSESSMENT — PATIENT HEALTH QUESTIONNAIRE - PHQ9: 5. POOR APPETITE OR OVEREATING: SEVERAL DAYS

## 2017-05-03 NOTE — LETTER
May 9, 2017    China Mckenzie  [address] [place] [address]    Dear China,  We are happy to inform you that your PAP smear result from 5/3/17 is normal.  We are now able to do a follow up test on PAP smears. The DNA test is for HPV (Human Papilloma Virus). Cervical cancer is closely linked with certain types of HPV. Your result showed no evidence of high risk HPV.  Therefore we recommend you return in 3-5 years for your next pap smear and HPV test.  You will still need to return to the clinic every year for an annual exam and other preventive tests.  Please contact the clinic at [phone] with any questions.  Sincerely,    Heather Mckenzie MD/silvia

## 2017-05-03 NOTE — PATIENT INSTRUCTIONS
Thank you for choosing [facility].  You may be receiving a survey in the mail from [employer] [facility] regarding your visit today.  Please take a few minutes to complete and return the survey to let us know how we are doing.      Our Clinic hours are:  Mondays    7:20 am - 7 pm  Tues - Fri  7:20 am - 5 pm    Clinic Phone: [phone]    The clinic lab opens at 7:30 am Mon - Fri and appointments are required.    [facility]. [phone]  Monday-Thursday 8 am - 7pm  Tues/Wed/Fri 8 am - 5:30 pm         Preventive Health Recommendations  Female Ages 50 - 64    Yearly exam: See your health care provider every year in order to  o Review health changes.   o Discuss preventive care.    o Review your medicines if your doctor has prescribed any.      Get a Pap test every three years (unless you have an abnormal result and your provider advises testing more often).    If you get Pap tests with HPV test, you only need to test every 5 years, unless you have an abnormal result.     You do not need a Pap test if your uterus was removed (hysterectomy) and you have not had cancer.    You should be tested each year for STDs (sexually transmitted diseases) if you're at risk.     Have a mammogram every 1 to 2 years.    Have a colonoscopy at age 50, or have a yearly FIT test (stool test). These exams screen for colon cancer.      Have a cholesterol test every 5 years, or more often if advised.    Have a diabetes test (fasting glucose) every three years. If you are at risk for diabetes, you should have this test more often.     If you are at risk for osteoporosis (brittle bone disease), think about having a bone density scan (DEXA).    Shots: Get a flu shot each year. Get a tetanus shot every 10 years.    Nutrition:     Eat at least 5 servings of fruits and vegetables each day.    Eat whole-grain bread, whole-wheat pasta and brown rice instead of white grains and rice.    Talk to your provider about Calcium  and Vitamin D.     Lifestyle    Exercise at least 150 minutes a week (30 minutes a day, 5 days a week). This will help you control your weight and prevent disease.    Limit alcohol to one drink per day.    No smoking.     Wear sunscreen to prevent skin cancer.     See your dentist every six months for an exam and cleaning.    See your eye doctor every 1 to 2 years.

## 2017-05-03 NOTE — PROGRESS NOTES
SUBJECTIVE:     CC: China Mckenzie is an 56 year old woman who presents for preventive health visit.     Healthy Habits:    Do you get at least three servings of calcium containing foods daily (dairy, green leafy vegetables, etc.)? yes    Amount of exercise or daily activities, outside of work: none    Problems taking medications regularly No    Medication side effects: No    Have you had an eye exam in the past two years? yes    Do you see a dentist twice per year? yes    Do you have sleep apnea, excessive snoring or daytime drowsiness?no          Chronic Pain Follow-Up       Pain is improved on the higher doses of gabapentin.  Only using 1-2 norco/day now.    Due for Utox.       PHQ-9 SCORE 11/23/2016 3/30/2017 5/3/2017   Total Score - - -   Total Score 0 15 9     RON-7 SCORE 4/13/2016 1/5/2017 5/3/2017   Total Score - - -   Total Score 5 9 5     Encounter-Level CSA:     There are no encounter-level csa.             Today's PHQ-2 Score:   PHQ-2 ( 1999 Pfizer) 11/23/2016 10/23/2015   Q1: Little interest or pleasure in doing things 0 0   Q2: Feeling down, depressed or hopeless 0 0   PHQ-2 Score 0 0       Abuse: Current or Past(Physical, Sexual or Emotional)- No  Do you feel safe in your environment - Yes    Social History   Substance Use Topics     Smoking status: Never Smoker     Smokeless tobacco: Never Used     Alcohol use Yes      Comment: occ     The patient does not drink >3 drinks per day nor >7 drinks per week.    Recent Labs   Lab Test  03/07/14   1159   CHOL  209*   HDL  88   LDL  111   TRIG  49   CHOLHDLRATIO  2.0       Reviewed orders with patient.  Reviewed health maintenance and updated orders accordingly - Yes    Mammo Decision Support:  Patient over age 50, mutual decision to screen reflected in health maintenance.    Pertinent mammograms are reviewed under the imaging tab.  History of abnormal Pap smear: NO - age 30-65 PAP every 5 years with negative HPV co-testing recommended    Reviewed and  "updated as needed this visit by clinical staff  Tobacco  Allergies  Med Hx  Surg Hx  Fam Hx  Soc Hx        Reviewed and updated as needed this visit by Provider            ROS:  C: NEGATIVE for fever, chills, change in weight  I: NEGATIVE for worrisome rashes, moles or lesions  E: NEGATIVE for vision changes or irritation  ENT: NEGATIVE for ear, mouth and throat problems  R: NEGATIVE for significant cough or SOB  B: NEGATIVE for masses, tenderness or discharge  CV: NEGATIVE for chest pain, palpitations or peripheral edema  GI: NEGATIVE for nausea, abdominal pain, heartburn, or change in bowel habits  : NEGATIVE for unusual urinary or vaginal symptoms. No vaginal bleeding.  N: NEGATIVE for weakness, dizziness or paresthesias  P: NEGATIVE for changes in mood or affect     Problem list, Medication list, Allergies, and Medical/Social/Surgical histories reviewed in [facility] and updated as appropriate.  Labs reviewed in EPIC  BP Readings from Last 3 Encounters:   05/03/17 128/77   03/30/17 114/70   02/01/17 119/63    Wt Readings from Last 3 Encounters:   05/03/17 105 lb (47.6 kg)   03/30/17 107 lb (48.5 kg)   02/01/17 112 lb (50.8 kg)                  OBJECTIVE:     /77  Pulse 75  Resp 18  Ht 4' 8\" (1.422 m)  Wt 105 lb (47.6 kg)  Breastfeeding? No  BMI 23.54 kg/m2  EXAM:  GENERAL: healthy, alert and no distress  EYES: Eyes grossly normal to inspection, PERRL and conjunctivae and sclerae normal  HENT: ear canals and TM's normal, nose and mouth without ulcers or lesions  NECK: no adenopathy, no asymmetry, masses, or scars and thyroid normal to palpation  RESP: lungs clear to auscultation - no rales, rhonchi or wheezes  BREAST: normal without masses, tenderness or nipple discharge and no palpable axillary masses or adenopathy  CV: regular rate and rhythm, normal S1 S2, no S3 or S4, no murmur, click or rub, no peripheral edema and peripheral pulses strong  ABDOMEN: soft, nontender, no hepatosplenomegaly, no " "masses and bowel sounds normal   (female): normal female external genitalia, normal urethral meatus , vaginal mucosa pink, moist, well rugated, normal cervix, adnexae, and uterus without masses. and pap obtained.  Exam painful/uncomfortable for patient.   MS: no gross musculoskeletal defects noted, no edema  SKIN: no suspicious lesions or rashes  NEURO: Normal strength and tone, mentation intact and speech normal  PSYCH: mentation appears normal, affect normal/bright    Results for orders placed or performed in visit on 05/03/17   Hemoglobin A1c   Result Value Ref Range    Hemoglobin A1C 5.4 4.3 - 6.0 %         ASSESSMENT/PLAN:     1. Routine general medical examination at a health care facility       2. Screening for cervical cancer     - Pap imaged thin layer screen with HPV - recommended age 30 - 65 years (select HPV order below)  - HPV High Risk Types DNA Cervical    3. Elevated glucose   HgbA1c is normal at 5.4%.   - Hemoglobin A1c    4. Need for hepatitis C screening test     - Hepatitis C antibody    5. Chronic pain syndrome     - Drug Abuse Screen Panel 13, Urine (Pain Care Package)    6. Need for vaccination     - TDAP VACCINE (ADACEL) [18899.002]  - 1st  Administration  [00231]    COUNSELING:   Reviewed preventive health counseling, as reflected in patient instructions       Regular exercise       Healthy diet/nutrition         reports that she has never smoked. She has never used smokeless tobacco.    Estimated body mass index is 23.54 kg/(m^2) as calculated from the following:    Height as of this encounter: 4' 8\" (1.422 m).    Weight as of this encounter: 105 lb (47.6 kg).       Counseling Resources:  ATP IV Guidelines  Pooled Cohorts Equation Calculator  Breast Cancer Risk Calculator  FRAX Risk Assessment  ICSI Preventive Guidelines  Dietary Guidelines for Americans, 2010  XD Nutrition's MyPlate  ASA Prophylaxis  Lung CA Screening    Heather Mckenzie MD  [facility] [place]  "

## 2017-05-03 NOTE — LETTER
[facility]  [address]  [facility] [address]  Phone: [phone]      5/4/2017     China Mckenzie  [address] [place] [address]      Dear China:    Thank you for allowing me to participate in your care. Your recent test results were reviewed and listed below.      Your results are provided below for your review  Results for orders placed or performed in visit on 05/03/17   Hemoglobin A1c   Result Value Ref Range    Hemoglobin A1C 5.4 4.3 - 6.0 %     Normal, no diabetes.             Thank you for choosing [facility]. As a result, please continue with the treatment plan discussed in the office. Return as discussed or sooner if symptoms worsen or fail to improve. If you have any further questions or concerns, please do not hesitate to contact us.      Sincerely,        Dr. Heather Mckenzie

## 2017-05-03 NOTE — LETTER
[facility]  [address]  [facility] [address]  Phone: [phone]      5/8/2017     China Mckenzie  [address] [place] [address]      Dear China:    Thank you for allowing me to participate in your care. Your recent test results were reviewed and listed below.      Your results are provided below for your review      Hepatitis C is negative.        Thank you for choosing [facility]. As a result, please continue with the treatment plan discussed in the office. Return as discussed or sooner if symptoms worsen or fail to improve. If you have any further questions or concerns, please do not hesitate to contact us.      Sincerely,        Dr. Heather Mckenzie

## 2017-05-03 NOTE — NURSING NOTE
"Chief Complaint   Patient presents with     Physical       Initial /77  Pulse 75  Resp 18  Ht 4' 8\" (1.422 m)  Wt 105 lb (47.6 kg)  Breastfeeding? No  BMI 23.54 kg/m2 Estimated body mass index is 23.54 kg/(m^2) as calculated from the following:    Height as of this encounter: 4' 8\" (1.422 m).    Weight as of this encounter: 105 lb (47.6 kg).  Medication Reconciliation: complete    "

## 2017-05-03 NOTE — MR AVS SNAPSHOT
After Visit Summary   5/3/2017    China Mckenzie    MRN: [id number]           Patient Information     Date Of Birth          1960        Visit Information        Provider Department      5/3/2017 3:20 PM Heather Mckenzie MD [facility]        Today's Diagnoses     Routine general medical examination at a health care facility    -  1    Screening for cervical cancer        Elevated glucose        Need for hepatitis C screening test        Chronic pain syndrome          Care Instructions          Thank you for choosing [facility].  You may be receiving a survey in the mail from [employer] regarding your visit today.  Please take a few minutes to complete and return the survey to let us know how we are doing.      Our Clinic hours are:  Mondays    7:20 am - 7 pm  Tues -  Fri  7:20 am - 5 pm    Clinic Phone: [phone]    The clinic lab opens at 7:30 am Mon - Fri and appointments are required.    [facility]  Ph. [phone]  Monday-Thursday 8 am - 7pm  Tues/Wed/Fri 8 am - 5:30 pm         Preventive Health Recommendations  Female Ages 50 - 64    Yearly exam: See your health care provider every year in order to  o Review health changes.   o Discuss preventive care.    o Review your medicines if your doctor has prescribed any.      Get a Pap test every three years (unless you have an abnormal result and your provider advises testing more often).    If you get Pap tests with HPV test, you only need to test every 5 years, unless you have an abnormal result.     You do not need a Pap test if your uterus was removed (hysterectomy) and you have not had cancer.    You should be tested each year for STDs (sexually transmitted diseases) if you're at risk.     Have a mammogram every 1 to 2 years.    Have a colonoscopy at age 50, or have a yearly FIT test (stool test). These exams screen for colon cancer.      Have a cholesterol test every 5 years, or more often  "if advised.    Have a diabetes test (fasting glucose) every three years. If you are at risk for diabetes, you should have this test more often.     If you are at risk for osteoporosis (brittle bone disease), think about having a bone density scan (DEXA).    Shots: Get a flu shot each year. Get a tetanus shot every 10 years.    Nutrition:     Eat at least 5 servings of fruits and vegetables each day.    Eat whole-grain bread, whole-wheat pasta and brown rice instead of white grains and rice.    Talk to your provider about Calcium and Vitamin D.     Lifestyle    Exercise at least 150 minutes a week (30 minutes a day, 5 days a week). This will help you control your weight and prevent disease.    Limit alcohol to one drink per day.    No smoking.     Wear sunscreen to prevent skin cancer.     See your dentist every six months for an exam and cleaning.    See your eye doctor every 1 to 2 years.          Follow-ups after your visit        Who to contact     If you have questions or need follow up information about today's clinic visit or your schedule please contact [facility] directly at [phone].  Normal or non-critical lab and imaging results will be communicated to you by [employer], letter or phone within 4 business days after the clinic has received the results. If you do not hear from us within 7 days, please contact the clinic through [employer] or phone. If you have a critical or abnormal lab result, we will notify you by phone as soon as possible.  Submit refill requests through [employer] or call your pharmacy and they will forward the refill request to us. Please allow 3 business days for your refill to be completed.          Additional Information About Your Visit        [employer] Information     [employer] lets you send messages to your doctor, view your test results, renew your prescriptions, schedule appointments and more. To sign up, go to [email or web address] . Click on \"Log in\" on the left " "side of the screen, which will take you to the Welcome page. Then click on \"Sign up Now\" on the right side of the page.     You will be asked to enter the access code listed below, as well as some personal information. Please follow the directions to create your username and password.     Your access code is: [email or web address]  Expires: 2017  4:00 PM     Your access code will  in 90 days. If you need help or a new code, please call your [facility] or [phone].        Care EveryWhere ID     This is your Care EveryWhere ID. This could be used by other organizations to access your [place] medical records  [id number]        Your Vitals Were     Pulse Respirations Height Breastfeeding? BMI (Body Mass Index)       75 18 4' 8\" (1.422 m) No 23.54 kg/m2        Blood Pressure from Last 3 Encounters:   17 128/77   17 114/70   17 119/63    Weight from Last 3 Encounters:   17 105 lb (47.6 kg)   17 107 lb (48.5 kg)   17 112 lb (50.8 kg)              We Performed the Following     Drug Abuse Screen Panel 13, Urine (Pain Care Package)     Hemoglobin A1c     Hepatitis C antibody     HPV High Risk Types DNA Cervical     Pap imaged thin layer screen with HPV - recommended age 30 - 65 years (select HPV order below)        Primary Care Provider Office Phone # Fax #    Heather Mckenzie [phone]       [facility] [address] [facility] [address]        Thank you!     Thank you for choosing [facility]  for your care. Our goal is always to provide you with excellent care. Hearing back from our patients is one way we can continue to improve our services. Please take a few minutes to complete the written survey that you may receive in the mail after your visit with us. Thank you!             Your Updated Medication List - Protect others around you: Learn how to safely use, store and throw away your medicines at www.disposemymeds.org.        "   This list is accurate as of: 5/3/17  4:00 PM.  Always use your most recent med list.                   Brand Name Dispense Instructions for use    CALCIUM PO      Take 600 mg by mouth       cyclobenzaprine 10 MG tablet    FLEXERIL    30 tablet    Take 1 tablet (10 mg) by mouth 3 times daily as needed for muscle spasms        ULTRA STRENGTH 2000 UNITS Caps   Generic drug:  cholecalciferol      Take 2,000 Units by mouth       estradiol 1 MG tablet    ESTRACE    90 tablet    Take 1 tablet (1 mg) by mouth daily       ESTROVEN PO          FLUoxetine 20 MG capsule    PROzac    90 capsule    Take 1 capsule (20 mg) by mouth daily       gabapentin 300 MG capsule    NEURONTIN    540 capsule    Take 2 capsules (600 mg) by mouth 3 times daily       * HYDROcodone-acetaminophen 5-325 MG per tablet    NORCO    60 tablet    Take 1 tablet by mouth 2 times daily as needed for other (Moderate to Severe Pain)       * HYDROcodone-acetaminophen 5-325 MG per tablet   Start taking on:  5/20/2017    NORCO    60 tablet    Take 1 tablet by mouth every 6 hours as needed for moderate to severe pain       * HYDROcodone-acetaminophen 5-325 MG per tablet   Start taking on:  6/19/2017    NORCO    60 tablet    Take 1 tablet by mouth 2 times daily as needed for other (Moderate to Severe Pain)       medroxyPROGESTERone 2.5 MG tablet    PROVERA    90 tablet    Take 1 tablet (2.5 mg) by mouth daily       SUMAtriptan 100 MG tablet    IMITREX    18 tablet    Take 1 tablet (100 mg) by mouth at onset of headache for migraine May repeat in 2 hours if needed: max 2/day       TESTOSTERONE IN VANICREAM 2% CREAM      Apply topically every morning       * Notice:  This list has 3 medication(s) that are the same as other medications prescribed for you. Read the directions carefully, and ask your doctor or other care provider to review them with you.

## 2017-05-04 DIAGNOSIS — G89.4 CHRONIC PAIN SYNDROME: ICD-10-CM

## 2017-05-04 LAB — HCV AB SERPL QL IA: NORMAL

## 2017-05-04 PROCEDURE — 80306 DRUG TEST PRSMV INSTRMNT: CPT | Performed by: FAMILY MEDICINE

## 2017-05-04 ASSESSMENT — PATIENT HEALTH QUESTIONNAIRE - PHQ9: SUM OF ALL RESPONSES TO PHQ QUESTIONS 1-9: 9

## 2017-05-04 ASSESSMENT — ANXIETY QUESTIONNAIRES: GAD7 TOTAL SCORE: 5

## 2017-05-05 LAB
AMPHETAMINES UR QL: NOT DETECTED NG/ML
BARBITURATES UR QL SCN: NOT DETECTED NG/ML
BENZODIAZ UR QL SCN: NOT DETECTED NG/ML
BUPRENORPHINE UR QL: NOT DETECTED NG/ML
CANNABINOIDS UR QL: NOT DETECTED NG/ML
COCAINE UR QL SCN: NOT DETECTED NG/ML
COPATH REPORT: NORMAL
D-METHAMPHET UR QL: NOT DETECTED NG/ML
METHADONE UR QL SCN: NOT DETECTED NG/ML
OPIATES UR QL SCN: ABNORMAL NG/ML
OXYCODONE UR QL SCN: NOT DETECTED NG/ML
PAP: NORMAL
PCP UR QL SCN: NOT DETECTED NG/ML
PROPOXYPH UR QL: NOT DETECTED NG/ML
TRICYCLICS UR QL SCN: NOT DETECTED NG/ML

## 2017-05-08 LAB
FINAL DIAGNOSIS: NORMAL
HPV HR 12 DNA CVX QL NAA+PROBE: NEGATIVE
HPV16 DNA SPEC QL NAA+PROBE: NEGATIVE
HPV18 DNA SPEC QL NAA+PROBE: NEGATIVE
SPECIMEN DESCRIPTION: NORMAL

## 2017-06-13 ENCOUNTER — TELEPHONE (OUTPATIENT)
Dept: FAMILY MEDICINE | Facility: CLINIC | Age: 57
End: 2017-06-13

## 2017-06-13 NOTE — TELEPHONE ENCOUNTER
"Reason for call:  Patient reporting a symptom    Symptom or request: Pt has had vertigo for 15 years and 2-3 days ago it started to get really bad and she is falling.  \"It feels like someone is shoving me to the ground.\"  No Nausea, no vomitting and no other symptoms.  \"I am on my way driving to [place] right now and I can come in now if Dr. Mckenzie wants me to.\"       Duration (how long have symptoms been present): 2-3 days    Have you been treated for this before? Yes    Additional comments:     Phone Number patient can be reached at:  Home number on file [phone] (home)    Best Time:  any    Can we leave a detailed message on this number:  YES    Call taken on 6/13/2017 at 3:20 PM by Gloria Escobar    "

## 2017-06-14 NOTE — TELEPHONE ENCOUNTER
"Patient has been \"really dizzy\".  Started on Sunday.  Moves head and her whole world is spinning and she is off balance.  If she bends over she loses her balance and yesterday she reached into her purse on the floor and fell on the floor.  No LOC and did not hit her head. This is ongoing but worse lately.  Has been told she has inner ear problems in the past.  She would like to be seen.  She agrees to an appointment with Dr. EVAN Wiley tomorrow afternoon.   "

## 2017-06-22 ENCOUNTER — OFFICE VISIT (OUTPATIENT)
Dept: FAMILY MEDICINE | Facility: CLINIC | Age: 57
End: 2017-06-22
Payer: COMMERCIAL

## 2017-06-22 VITALS
BODY MASS INDEX: 24.35 KG/M2 | DIASTOLIC BLOOD PRESSURE: 76 MMHG | SYSTOLIC BLOOD PRESSURE: 122 MMHG | WEIGHT: 108.6 LBS | HEART RATE: 73 BPM

## 2017-06-22 DIAGNOSIS — H81.10 BPPV (BENIGN PAROXYSMAL POSITIONAL VERTIGO), UNSPECIFIED LATERALITY: Primary | ICD-10-CM

## 2017-06-22 PROCEDURE — 99213 OFFICE O/P EST LOW 20 MIN: CPT | Performed by: FAMILY MEDICINE

## 2017-06-22 RX ORDER — MECLIZINE HYDROCHLORIDE 25 MG/1
12.5-25 TABLET ORAL EVERY 6 HOURS PRN
Qty: 30 TABLET | Refills: 3 | Status: SHIPPED | OUTPATIENT
Start: 2017-06-22 | End: 2020-01-10

## 2017-06-22 NOTE — MR AVS SNAPSHOT
"              After Visit Summary   6/22/2017    China Mckenzie    MRN: [id number]           Patient Information     Date Of Birth          1960        Visit Information        Provider Department      6/22/2017 9:00 AM Yaquelin Naidu MD [facility]        Today's Diagnoses     BPPV (benign paroxysmal positional vertigo), unspecified laterality    -  1       Follow-ups after your visit        Additional Services     PHYSICAL THERAPY REFERRAL       *This therapy referral will be filtered to a centralized scheduling office at [facility] and the patient will receive a call to schedule an appointment at a [place] location most convenient for them. *     [facility] provides Physical Therapy evaluation and treatment and many specialty services across the [place] system.  If requesting a specialty program, please choose from the list below.    If you have not heard from the scheduling office within 2 business days, please call [phone] for all locations, with the exception of [place], please call [phone].  Treatment: Evaluation & Treatment  Special Instructions/Modalities:   Special Programs: Balance/Vestibular    Please be aware that coverage of these services is subject to the terms and limitations of your health insurance plan.  Call member services at your health plan with any benefit or coverage questions.      **Note to Provider:  If you are referring outside of [place] for the therapy appointment, please list the name of the location in the \"special instructions\" above, print the referral and give to the patient to schedule the appointment.                  Who to contact     If you have questions or need follow up information about today's clinic visit or your schedule please contact [facility] directly at [phone].  Normal or non-critical lab and imaging results will be communicated to you by [place], " "letter or phone within 4 business days after the clinic has received the results. If you do not hear from us within 7 days, please contact the clinic through [employer] or phone. If you have a critical or abnormal lab result, we will notify you by phone as soon as possible.  Submit refill requests through [employer] or call your pharmacy and they will forward the refill request to us. Please allow 3 business days for your refill to be completed.          Additional Information About Your Visit        [employer] Information     [employer] lets you send messages to your doctor, view your test results, renew your prescriptions, schedule appointments and more. To sign up, go to [email or web address] . Click on \"Log in\" on the left side of the screen, which will take you to the Welcome page. Then click on \"Sign up Now\" on the right side of the page.     You will be asked to enter the access code listed below, as well as some personal information. Please follow the directions to create your username and password.     Your access code is: [email or web address]  Expires: 2017  4:00 PM     Your access code will  in 90 days. If you need help or a new code, please call your [place] clinic or [phone].        Care EveryWhere ID     This is your Care EveryWhere ID. This could be used by other organizations to access your [place] medical records  [id number]        Your Vitals Were     Pulse BMI (Body Mass Index)                73 24.35 kg/m2           Blood Pressure from Last 3 Encounters:   17 122/76   17 128/77   17 114/70    Weight from Last 3 Encounters:   17 108 lb 9.6 oz (49.3 kg)   17 105 lb (47.6 kg)   17 107 lb (48.5 kg)              We Performed the Following     PHYSICAL THERAPY REFERRAL          Today's Medication Changes          These changes are accurate as of: 17  9:39 AM.  If you have any questions, ask your nurse or doctor.               Start taking these medicines.     "    Dose/Directions    meclizine 25 MG tablet   Commonly known as:  ANTIVERT   Used for:  BPPV (benign paroxysmal positional vertigo), unspecified laterality   Started by:  Yaquelin Naidu MD        Dose:  12.5-25 mg   Take 0.5-1 tablets (12.5-25 mg) by mouth every 6 hours as needed for dizziness   Quantity:  30 tablet   Refills:  3            Where to get your medicines      These medications were sent to [place] [facility] [place] [place] - [address]  [address], [place] [address]    Hours:  [facility] [facility] Phone:  [phone]     meclizine 25 MG tablet                Primary Care Provider Office Phone # Fax #    Heather Mckenzie [phone]       [facility] [address]  [facility] [address]        Equal Access to Services     [facility]: Hadii aad ku hadasho Soomaali, waaxda luqadaha, qaybta kaalmada adeegyada, waxay idiin hayaan adejae kharasahara chandler . So [facility] [phone].    ATENCIÓN: Si habla español, tiene a blanchard disposición servicios gratuitos de asistencia lingüística. Llame al [phone].    We comply with applicable federal civil rights laws and [place] laws. We do not discriminate on the basis of race, color, national origin, age, disability sex, sexual orientation or gender identity.            Thank you!     Thank you for choosing [facility]  for your care. Our goal is always to provide you with excellent care. Hearing back from our patients is one way we can continue to improve our services. Please take a few minutes to complete the written survey that you may receive in the mail after your visit with us. Thank you!             Your Updated Medication List - Protect others around you: Learn how to safely use, store and throw away your medicines at www.disposemymeds.org.          This list is accurate as of: 6/22/17  9:39 AM.  Always use your most recent med list.                   Brand Name Dispense  Instructions for use Diagnosis    CALCIUM PO      Take 600 mg by mouth        cyclobenzaprine 10 MG tablet    FLEXERIL    30 tablet    Take 1 tablet (10 mg) by mouth 3 times daily as needed for muscle spasms    Back muscle spasm        ULTRA STRENGTH 2000 UNITS Caps   Generic drug:  cholecalciferol      Take 2,000 Units by mouth        estradiol 1 MG tablet    ESTRACE    90 tablet    Take 1 tablet (1 mg) by mouth daily    Symptomatic menopausal or female climacteric states       ESTROVEN PO           FLUoxetine 20 MG capsule    PROzac    90 capsule    Take 1 capsule (20 mg) by mouth daily    Generalized anxiety disorder       gabapentin 300 MG capsule    NEURONTIN    540 capsule    Take 2 capsules (600 mg) by mouth 3 times daily    Chronic pain syndrome       HYDROcodone-acetaminophen 5-325 MG per tablet    NORCO    60 tablet    Take 1 tablet by mouth 2 times daily as needed for other (Moderate to Severe Pain)    Arthropathy       meclizine 25 MG tablet    ANTIVERT    30 tablet    Take 0.5-1 tablets (12.5-25 mg) by mouth every 6 hours as needed for dizziness    BPPV (benign paroxysmal positional vertigo), unspecified laterality       SUMAtriptan 100 MG tablet    IMITREX    18 tablet    Take 1 tablet (100 mg) by mouth at onset of headache for migraine May repeat in 2 hours if needed: max 2/day    Migraine without aura and without status migrainosus, not intractable       TESTOSTERONE IN VANICREAM 2% CREAM      Apply topically every morning

## 2017-06-22 NOTE — PROGRESS NOTES
SUBJECTIVE:                                                    China Mckenzie is a 56 year old female who presents to clinic today for the following health issues:    Chief Complaint   Patient presents with     Dizziness     she has had this for years but it has been severe for the last three weeks.      Dizziness     Onset: 3 weeks    Description:   Do you feel faint:  YES  Does it feel like the surroundings (bed, room) are moving: YES  Unsteady/off balance: YES  Have you passed out or fallen: YES- fallen    Intensity: severe    Progression of Symptoms:  worsening    Accompanying Signs & Symptoms:  Heart palpitations: no   Nausea, vomiting: YES  Weakness in arms or legs: no   Fatigue: YES  Vision or speech changes: YES-vision gets blurry  Ringing in ears (Tinnitus): no   Hearing Loss: no -deaf in right ear   History:   Head trauma/concussion hx: no   Previous similar symptoms: YES  Recent bleeding history: no     Precipitating factors:   Worse with activity or head movement: YES  Any new medications (BP?): no   Alcohol/drug abuse/withdrawal: no     Alleviating factors:   Does staying in a fixed position give relief:  no        Therapies Tried and outcome: none      China Mckenzie is a 56 year old female who complains of episodic periods of vertigo starting in December of 1992 when she first moved to [place] form [place], and has usually associated these with a weather cold snap.  She has sought care a couple of times in the past but her symptoms had always resolved by the time she was seen. She now is concerned because of persistent symptoms for the past two weeks despite no change in weather.  She states she is often off balance, but has worst symptoms when she lies down or is supine for any period.    She is getting chiropractic care for general health, not for this complaint, and says her chiropractor has to help her off the table.  She leaned over for her purse on a chair once, and fell  off.  She has not had any trouble driving and checking side lanes.  She has not had any trouble getting into or out of the car.    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  She denies tinnitus.    OBJECTIVE: /76 (BP Location: Right arm, Patient Position: Chair, Cuff Size: Adult Regular)  Pulse 73  Wt 108 lb 9.6 oz (49.3 kg)  BMI 24.35 kg/m2   assistive device.  This is a petite woman in no apparent distress.  She seems a little unsteady walking to the exam table, but is not using any  Assistive device.  Ears are clear.  Nose and throat are normal.  EOMs are full without nystagmus.  As soon as she is placed in a supine position, she experiences vertigo. It is not possible to see eye movements because she keeps her eyes closed, states she is sensitive to the ceiling lights and the bright lights can trigger headaches.  The vertigo lightens after several minutes.  Passive head turning to right or left or forward flexion does not seem to affect her symptoms.    ASSESSMENT: benign positional vertigo    PLAN: Referral to physical therapy   Rx meclizine 12.5 - 25 mg TID prn.    Yaquelin Naidu md

## 2017-06-22 NOTE — NURSING NOTE
"Chief Complaint   Patient presents with     Dizziness     she has had this for years but it has been severe for the last three weeks.        Initial /76 (BP Location: Right arm, Patient Position: Chair, Cuff Size: Adult Regular)  Pulse 73  Wt 108 lb 9.6 oz (49.3 kg)  BMI 24.35 kg/m2 Estimated body mass index is 24.35 kg/(m^2) as calculated from the following:    Height as of 5/3/17: 4' 8\" (1.422 m).    Weight as of this encounter: 108 lb 9.6 oz (49.3 kg).  Medication Reconciliation: complete   Yajaira Guerrier CMA    "

## 2017-07-05 DIAGNOSIS — N95.1 SYMPTOMATIC MENOPAUSAL OR FEMALE CLIMACTERIC STATES: ICD-10-CM

## 2017-07-05 NOTE — TELEPHONE ENCOUNTER
Refill request received.  Patient noted at last office visit therapy was completed.  Calling to clarify.  Unable to reach patient.  Left message for patient to return call to clinic.    Ruth Gallego   [facility]  RN

## 2017-07-07 RX ORDER — MEDROXYPROGESTERONE ACETATE 2.5 MG/1
2.5 TABLET ORAL DAILY
Qty: 90 TABLET | Refills: 3 | OUTPATIENT
Start: 2017-07-07

## 2017-07-07 NOTE — TELEPHONE ENCOUNTER
Spoke to pt and she reported that she does not take the hormones anymore. Refill denied.  Daya BASHIR RN BSN PHN  Specialty Clinics

## 2017-07-18 ENCOUNTER — OFFICE VISIT (OUTPATIENT)
Dept: FAMILY MEDICINE | Facility: CLINIC | Age: 57
End: 2017-07-18
Payer: COMMERCIAL

## 2017-07-18 ENCOUNTER — RADIANT APPOINTMENT (OUTPATIENT)
Dept: GENERAL RADIOLOGY | Facility: CLINIC | Age: 57
End: 2017-07-18
Attending: PHYSICIAN ASSISTANT
Payer: COMMERCIAL

## 2017-07-18 VITALS
RESPIRATION RATE: 18 BRPM | TEMPERATURE: 97.6 F | HEART RATE: 79 BPM | WEIGHT: 108.4 LBS | SYSTOLIC BLOOD PRESSURE: 115 MMHG | DIASTOLIC BLOOD PRESSURE: 75 MMHG | BODY MASS INDEX: 24.3 KG/M2

## 2017-07-18 DIAGNOSIS — G89.29 CHRONIC PAIN OF LEFT ANKLE: ICD-10-CM

## 2017-07-18 DIAGNOSIS — G89.29 CHRONIC PAIN OF LEFT ANKLE: Primary | ICD-10-CM

## 2017-07-18 DIAGNOSIS — M25.572 CHRONIC PAIN OF LEFT ANKLE: ICD-10-CM

## 2017-07-18 DIAGNOSIS — M25.572 CHRONIC PAIN OF LEFT ANKLE: Primary | ICD-10-CM

## 2017-07-18 PROCEDURE — 73610 X-RAY EXAM OF ANKLE: CPT | Mod: LT

## 2017-07-18 PROCEDURE — 99214 OFFICE O/P EST MOD 30 MIN: CPT | Performed by: PHYSICIAN ASSISTANT

## 2017-07-18 ASSESSMENT — ENCOUNTER SYMPTOMS
EYE PAIN: 0
DEPRESSION: 0
EYE REDNESS: 0
CONSTIPATION: 0
ABDOMINAL PAIN: 0
FEVER: 0
SORE THROAT: 0
SPUTUM PRODUCTION: 0
BLURRED VISION: 0
NAUSEA: 0
BACK PAIN: 0
COUGH: 0
ORTHOPNEA: 0
SEIZURES: 0
NERVOUS/ANXIOUS: 0
DIARRHEA: 0
HEADACHES: 0
BLOOD IN STOOL: 0
EYE DISCHARGE: 0
PALPITATIONS: 0
NECK PAIN: 0
WEAKNESS: 0
DIAPHORESIS: 0
WHEEZING: 0
FREQUENCY: 0
DYSURIA: 0
SENSORY CHANGE: 0
DIZZINESS: 0
VOMITING: 0
TINGLING: 0
FOCAL WEAKNESS: 0
INSOMNIA: 0
HALLUCINATIONS: 0
WEIGHT LOSS: 0
HEARTBURN: 0
SHORTNESS OF BREATH: 0
MYALGIAS: 0
DOUBLE VISION: 0
PHOTOPHOBIA: 0
NEUROLOGICAL NEGATIVE: 1
HEMOPTYSIS: 0
LOSS OF CONSCIOUSNESS: 0

## 2017-07-18 ASSESSMENT — PAIN SCALES - GENERAL: PAINLEVEL: WORST PAIN (10)

## 2017-07-18 ASSESSMENT — LIFESTYLE VARIABLES: SUBSTANCE_ABUSE: 0

## 2017-07-18 NOTE — NURSING NOTE
"Chief Complaint   Patient presents with     Musculoskeletal Problem       Initial Breastfeeding? No Estimated body mass index is 24.35 kg/(m^2) as calculated from the following:    Height as of 5/3/17: 4' 8\" (1.422 m).    Weight as of 6/22/17: 108 lb 9.6 oz (49.3 kg).  Medication Reconciliation: complete      Health Maintenance that is potentially due pending provider review:  NONE    n/a  "

## 2017-07-18 NOTE — MR AVS SNAPSHOT
After Visit Summary   7/18/2017    China Mckenzie    MRN: [id number]           Patient Information     Date Of Birth          1960        Visit Information        Provider Department      7/18/2017 3:00 PM Nayan Salazar PA-C [facility]        Today's Diagnoses     Chronic pain of left ankle    -  1       Follow-ups after your visit        Additional Services     ORTHO  REFERRAL       [facility] is referring you to the Orthopedic  Services at [facility].       The  Representative will assist you in the coordination of your Orthopedic and Musculoskeletal Care as prescribed by your physician.    The  Representative will call you within 1 business day to help schedule your appointment, or you may contact the  Representative at:    All areas ~ [phone]     Type of Referral : [facility]  Surgical / Specialist       Timeframe requested: Routine    Coverage of these services is subject to the terms and limitations of your health insurance plan.  Please call member services at your health plan with any benefit or coverage questions.      If X-rays, CT or MRI's have been performed, please contact the facility where they were done to arrange for , prior to your scheduled appointment.  Please bring this referral request to your appointment and present it to your specialist.                  Follow-up notes from your care team     Return if symptoms worsen or fail to improve.      Who to contact     If you have questions or need follow up information about today's clinic visit or your schedule please contact [facility] directly at [phone].  Normal or non-critical lab and imaging results will be communicated to you by MyChart, letter or phone within 4 business days after the clinic has received the results. If you do not hear from us  "within 7 days, please contact the clinic through [employer] or phone. If you have a critical or abnormal lab result, we will notify you by phone as soon as possible.  Submit refill requests through [employer] or call your pharmacy and they will forward the refill request to us. Please allow 3 business days for your refill to be completed.          Additional Information About Your Visit        XINGharStarGreetz Information     Back[employer]Agent lets you send messages to your doctor, view your test results, renew your prescriptions, schedule appointments and more. To sign up, go to [email or web address] . Click on \"Log in\" on the left side of the screen, which will take you to the Welcome page. Then click on \"Sign up Now\" on the right side of the page.     You will be asked to enter the access code listed below, as well as some personal information. Please follow the directions to create your username and password.     Your access code is: [email or web address]  Expires: 2017  4:00 PM     Your access code will  in 90 days. If you need help or a new code, please call your [facility] clinic or [phone].        Care EveryWhere ID     This is your Care EveryWhere ID. This could be used by other organizations to access your [facility] medical records  [id number]        Your Vitals Were     Pulse Temperature Respirations Breastfeeding? BMI (Body Mass Index)       79 97.6  F (36.4  C) (Tympanic) 18 No 24.3 kg/m2        Blood Pressure from Last 3 Encounters:   17 115/75   17 122/76   17 128/77    Weight from Last 3 Encounters:   17 108 lb 6.4 oz (49.2 kg)   17 108 lb 9.6 oz (49.3 kg)   17 105 lb (47.6 kg)              We Performed the Following     ORTHO  REFERRAL        Primary Care Provider Office Phone # Fax #    Heather Mckenzie [phone]       [facility] [address] [facility] [place] [address]        Equal Access to Services     LEIGHANN RAMSEY AH: Demetrice haines " Hetal, ashokda luqadaha, qaybta kamike laws, quan valle fahemejae valekoffi laCristijose a nirali. So [facility] [phone].    ATENCIÓN: Si kaden mitchell, tiene a blanchard disposición servicios gratuitos de asistencia lingüística. Kaz al [phone].    We comply with applicable federal civil rights laws and [place] laws. We do not discriminate on the basis of race, color, national origin, age, disability sex, sexual orientation or gender identity.            Thank you!     Thank you for choosing [facility]  for your care. Our goal is always to provide you with excellent care. Hearing back from our patients is one way we can continue to improve our services. Please take a few minutes to complete the written survey that you may receive in the mail after your visit with us. Thank you!             Your Updated Medication List - Protect others around you: Learn how to safely use, store and throw away your medicines at www.disposemymeds.org.          This list is accurate as of: 7/18/17  4:00 PM.  Always use your most recent med list.                   Brand Name Dispense Instructions for use Diagnosis    CALCIUM PO      Take 600 mg by mouth        cyclobenzaprine 10 MG tablet    FLEXERIL    30 tablet    Take 1 tablet (10 mg) by mouth 3 times daily as needed for muscle spasms    Back muscle spasm        ULTRA STRENGTH 2000 UNITS Caps   Generic drug:  cholecalciferol      Take 2,000 Units by mouth        estradiol 1 MG tablet    ESTRACE    90 tablet    Take 1 tablet (1 mg) by mouth daily    Symptomatic menopausal or female climacteric states       ESTROVEN PO           FLUoxetine 20 MG capsule    PROzac    90 capsule    Take 1 capsule (20 mg) by mouth daily    Generalized anxiety disorder       gabapentin 300 MG capsule    NEURONTIN    540 capsule    Take 2 capsules (600 mg) by mouth 3 times daily    Chronic pain syndrome       HYDROcodone-acetaminophen 5-325 MG per tablet    NORCO    60 tablet    Take 1  tablet by mouth 2 times daily as needed for other (Moderate to Severe Pain)    Arthropathy       meclizine 25 MG tablet    ANTIVERT    30 tablet    Take 0.5-1 tablets (12.5-25 mg) by mouth every 6 hours as needed for dizziness    BPPV (benign paroxysmal positional vertigo), unspecified laterality       SUMAtriptan 100 MG tablet    IMITREX    18 tablet    Take 1 tablet (100 mg) by mouth at onset of headache for migraine May repeat in 2 hours if needed: max 2/day    Migraine without aura and without status migrainosus, not intractable       TESTOSTERONE IN VANICREAM 2% CREAM      Apply topically every morning
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
## 2017-07-28 ENCOUNTER — OFFICE VISIT (OUTPATIENT)
Dept: FAMILY MEDICINE | Facility: CLINIC | Age: 57
End: 2017-07-28
Payer: COMMERCIAL

## 2017-07-28 VITALS
DIASTOLIC BLOOD PRESSURE: 70 MMHG | WEIGHT: 107.6 LBS | SYSTOLIC BLOOD PRESSURE: 110 MMHG | HEART RATE: 76 BPM | BODY MASS INDEX: 24.12 KG/M2

## 2017-07-28 DIAGNOSIS — G89.4 CHRONIC PAIN SYNDROME: Primary | ICD-10-CM

## 2017-07-28 DIAGNOSIS — M12.9 ARTHROPATHY: ICD-10-CM

## 2017-07-28 DIAGNOSIS — G43.009 MIGRAINE WITHOUT AURA AND WITHOUT STATUS MIGRAINOSUS, NOT INTRACTABLE: ICD-10-CM

## 2017-07-28 PROCEDURE — 99213 OFFICE O/P EST LOW 20 MIN: CPT | Performed by: FAMILY MEDICINE

## 2017-07-28 RX ORDER — HYDROCODONE BITARTRATE AND ACETAMINOPHEN 5; 325 MG/1; MG/1
1 TABLET ORAL 2 TIMES DAILY PRN
Qty: 60 TABLET | Refills: 0 | Status: SHIPPED | OUTPATIENT
Start: 2017-08-25 | End: 2017-07-28

## 2017-07-28 RX ORDER — GABAPENTIN 300 MG/1
600 CAPSULE ORAL 3 TIMES DAILY
Qty: 540 CAPSULE | Refills: 1 | Status: SHIPPED | OUTPATIENT
Start: 2017-07-28 | End: 2018-04-25

## 2017-07-28 RX ORDER — SUMATRIPTAN 100 MG/1
100 TABLET, FILM COATED ORAL
Qty: 18 TABLET | Refills: 5 | Status: SHIPPED | OUTPATIENT
Start: 2017-07-28 | End: 2018-10-05

## 2017-07-28 RX ORDER — HYDROCODONE BITARTRATE AND ACETAMINOPHEN 5; 325 MG/1; MG/1
1 TABLET ORAL 2 TIMES DAILY PRN
Qty: 60 TABLET | Refills: 0 | Status: SHIPPED | OUTPATIENT
Start: 2017-07-28 | End: 2017-07-28

## 2017-07-28 RX ORDER — HYDROCODONE BITARTRATE AND ACETAMINOPHEN 5; 325 MG/1; MG/1
1 TABLET ORAL 2 TIMES DAILY PRN
Qty: 60 TABLET | Refills: 0 | Status: SHIPPED | OUTPATIENT
Start: 2017-09-25 | End: 2017-11-07

## 2017-07-28 NOTE — NURSING NOTE
"Chief Complaint   Patient presents with     Refill Request     recheck/refill on medications       Initial /70 (BP Location: Right arm, Patient Position: Chair, Cuff Size: Adult Regular)  Pulse 76  Wt 107 lb 9.6 oz (48.8 kg)  BMI 24.12 kg/m2 Estimated body mass index is 24.12 kg/(m^2) as calculated from the following:    Height as of 5/3/17: 4' 8\" (1.422 m).    Weight as of this encounter: 107 lb 9.6 oz (48.8 kg).  Medication Reconciliation: complete     Alexandria Hewitt, CMA      "

## 2017-07-28 NOTE — PROGRESS NOTES
Subjective:  China Mckenzie is a 56 year old female   Chief Complaint   Patient presents with     Refill Request     recheck/refill on medications    She is here for refill of her chronic pain medications. She has been able to wean down on the hydrocodone to 60 tablets per month and states her pain is reasonably well controlled with this. The gabapentin has helped quite a bit. She does acknowledge that she often forgets to take the middle of the day dose of the gabapentin.    Her migraines are variable, tend to be brought on by flickering bright lights, so is doing several measures to help avoid this        Encounter Diagnoses   Name Primary?     Chronic pain syndrome Yes     Migraine without aura and without status migrainosus, not intractable      Arthropathy        ROS:other than noted above, general, HEENT, respiratory, cardiac, gastrointestinal systems are negative    Medical, surgical, social, and family histories, medications and allergies reviewed and updated.    Objective:  Exam:    GENERAL APPEARANCE ADULT: Alert, no acute distress  EYES: PERRL, EOM normal, conjunctiva and lids normal  PSYCH: mentation appears normal., affect and mood normal      ASSESSMENT:  1. Chronic pain syndrome    2. Migraine without aura and without status migrainosus, not intractable    3. Arthropathy        PLAN:  Orders Placed This Encounter     SUMAtriptan (IMITREX) 100 MG tablet     gabapentin (NEURONTIN) 300 MG capsule               HYDROcodone-acetaminophen (NORCO) 5-325 MG per tablet, refills ×3 months      Recheck in 3 months

## 2017-07-28 NOTE — MR AVS SNAPSHOT
"              After Visit Summary   2017    China Mckenzie    MRN: [id number]           Patient Information     Date Of Birth          1960        Visit Information        Provider Department      2017 8:20 AM ROGELIO France MD [facility]        Today's Diagnoses     Chronic pain syndrome    -  1    Migraine without aura and without status migrainosus, not intractable        Arthropathy           Follow-ups after your visit        Who to contact     If you have questions or need follow up information about today's clinic visit or your schedule please contact [facility] directly at [phone].  Normal or non-critical lab and imaging results will be communicated to you by MyChart, letter or phone within 4 business days after the clinic has received the results. If you do not hear from us within 7 days, please contact the clinic through [employer] or phone. If you have a critical or abnormal lab result, we will notify you by phone as soon as possible.  Submit refill requests through [employer] or call your pharmacy and they will forward the refill request to us. Please allow 3 business days for your refill to be completed.          Additional Information About Your Visit        MyChart Information     [employer] lets you send messages to your doctor, view your test results, renew your prescriptions, schedule appointments and more. To sign up, go to [email or web address] . Click on \"Log in\" on the left side of the screen, which will take you to the Welcome page. Then click on \"Sign up Now\" on the right side of the page.     You will be asked to enter the access code listed below, as well as some personal information. Please follow the directions to create your username and password.     Your access code is: [email or web address]  Expires: 2017  4:00 PM     Your access code will  in 90 days. If you need help or a new code, please call your [facility] or " [phone].        Care EveryWhere ID     This is your Care EveryWhere ID. This could be used by other organizations to access your [facility] medical records  [id number]        Your Vitals Were     Pulse BMI (Body Mass Index)                76 24.12 kg/m2           Blood Pressure from Last 3 Encounters:   07/28/17 110/70   07/18/17 115/75   06/22/17 122/76    Weight from Last 3 Encounters:   07/28/17 107 lb 9.6 oz (48.8 kg)   07/18/17 108 lb 6.4 oz (49.2 kg)   06/22/17 108 lb 9.6 oz (49.3 kg)              Today, you had the following     No orders found for display         Today's Medication Changes          These changes are accurate as of: 7/28/17 10:17 AM.  If you have any questions, ask your nurse or doctor.               Start taking these medicines.        Dose/Directions    HYDROcodone-acetaminophen 5-325 MG per tablet   Commonly known as:  NORCO   Used for:  Arthropathy        Dose:  1 tablet   Start taking on:  9/25/2017   Take 1 tablet by mouth 2 times daily as needed for other (Moderate to Severe Pain)   Quantity:  60 tablet   Refills:  0            Where to get your medicines      These medications were sent to [facility] [place] - [address]  [address], [place] [address]    Hours:  [facility] Phone:  [phone]     gabapentin 300 MG capsule    SUMAtriptan 100 MG tablet         Some of these will need a paper prescription and others can be bought over the counter.  Ask your nurse if you have questions.     Bring a paper prescription for each of these medications     HYDROcodone-acetaminophen 5-325 MG per tablet                Primary Care Provider Office Phone # Fax #    Heather Mckenzie [phone]       [facility] [address] [facility] [address]        Equal Access to Services     LEIGHANN RAMSEY AH: Demetrice haines [place], waaxda luqadaha, qaybta kaalmada adenawaf, quan george  lamaria e campoverde. So [facility] [phone].    ATENCIÓN: Si habla paula, tiene a blanchard disposición servicios gratuitos de asistencia lingüística. Kaz al [phone].    We comply with applicable federal civil rights laws and [place] laws. We do not discriminate on the basis of race, color, national origin, age, disability sex, sexual orientation or gender identity.            Thank you!     Thank you for choosing [facility]  for your care. Our goal is always to provide you with excellent care. Hearing back from our patients is one way we can continue to improve our services. Please take a few minutes to complete the written survey that you may receive in the mail after your visit with us. Thank you!             Your Updated Medication List - Protect others around you: Learn how to safely use, store and throw away your medicines at www.disposemymeds.org.          This list is accurate as of: 7/28/17 10:17 AM.  Always use your most recent med list.                   Brand Name Dispense Instructions for use Diagnosis    CALCIUM PO      Take 600 mg by mouth        cyclobenzaprine 10 MG tablet    FLEXERIL    30 tablet    Take 1 tablet (10 mg) by mouth 3 times daily as needed for muscle spasms    Back muscle spasm        ULTRA STRENGTH 2000 UNITS Caps   Generic drug:  cholecalciferol      Take 2,000 Units by mouth        estradiol 1 MG tablet    ESTRACE    90 tablet    Take 1 tablet (1 mg) by mouth daily    Symptomatic menopausal or female climacteric states       ESTROVEN PO           FLUoxetine 20 MG capsule    PROzac    90 capsule    Take 1 capsule (20 mg) by mouth daily    Generalized anxiety disorder       gabapentin 300 MG capsule    NEURONTIN    540 capsule    Take 2 capsules (600 mg) by mouth 3 times daily    Chronic pain syndrome       HYDROcodone-acetaminophen 5-325 MG per tablet   Start taking on:  9/25/2017    NORCO    60 tablet    Take 1 tablet by mouth 2 times daily as needed for other  (Moderate to Severe Pain)    Arthropathy       meclizine 25 MG tablet    ANTIVERT    30 tablet    Take 0.5-1 tablets (12.5-25 mg) by mouth every 6 hours as needed for dizziness    BPPV (benign paroxysmal positional vertigo), unspecified laterality       SUMAtriptan 100 MG tablet    IMITREX    18 tablet    Take 1 tablet (100 mg) by mouth at onset of headache for migraine May repeat in 2 hours if needed: max 2/day    Migraine without aura and without status migrainosus, not intractable       TESTOSTERONE IN VANICREAM 2% CREAM      Apply topically every morning

## 2017-10-11 DIAGNOSIS — F41.1 GENERALIZED ANXIETY DISORDER: ICD-10-CM

## 2017-10-11 DIAGNOSIS — M62.830 BACK MUSCLE SPASM: ICD-10-CM

## 2017-10-11 NOTE — TELEPHONE ENCOUNTER
Cyclobenzaprine      Last Written Prescription Date:  03/30/2017  Last Fill Quantity: 30,   # refills: 5  Last Office Visit with [facility], [facility] or  Health prescribing provider: 07/28/2017  Future Office visit:       Routing refill request to provider for review/approval because:  Drug not on the [facility], [facility] or [facility] refill protocol or controlled substance      Fluoxeinte     Last Written Prescription Date: 03/30/2017  Last Fill Quantity: 90, # refills: 1  Last Office Visit with [facility] primary care provider:  07/28/2017        Last PHQ-9 score on record=   PHQ-9 SCORE 5/3/2017   Total Score -   Total Score 9     PHQ-9 SCORE 11/23/2016 3/30/2017 5/3/2017   Total Score - - -   Total Score 0 15 9     RON-7 SCORE 4/13/2016 1/5/2017 5/3/2017   Total Score - - -   Total Score 5 9 5       Jason YARBROUGH (R)

## 2017-10-16 RX ORDER — CYCLOBENZAPRINE HCL 10 MG
10 TABLET ORAL 3 TIMES DAILY PRN
Qty: 30 TABLET | Refills: 5 | Status: SHIPPED | OUTPATIENT
Start: 2017-10-16 | End: 2018-06-11

## 2017-11-07 ENCOUNTER — OFFICE VISIT (OUTPATIENT)
Dept: FAMILY MEDICINE | Facility: CLINIC | Age: 57
End: 2017-11-07
Payer: COMMERCIAL

## 2017-11-07 VITALS
SYSTOLIC BLOOD PRESSURE: 110 MMHG | WEIGHT: 116 LBS | HEART RATE: 80 BPM | TEMPERATURE: 97.6 F | DIASTOLIC BLOOD PRESSURE: 79 MMHG | BODY MASS INDEX: 26.01 KG/M2

## 2017-11-07 DIAGNOSIS — M12.9 ARTHROPATHY: ICD-10-CM

## 2017-11-07 DIAGNOSIS — R63.5 WEIGHT GAIN: Primary | ICD-10-CM

## 2017-11-07 DIAGNOSIS — N95.1 SYMPTOMATIC MENOPAUSAL OR FEMALE CLIMACTERIC STATES: ICD-10-CM

## 2017-11-07 PROCEDURE — 99213 OFFICE O/P EST LOW 20 MIN: CPT | Performed by: FAMILY MEDICINE

## 2017-11-07 RX ORDER — HYDROCODONE BITARTRATE AND ACETAMINOPHEN 5; 325 MG/1; MG/1
1 TABLET ORAL 2 TIMES DAILY PRN
Qty: 60 TABLET | Refills: 0 | Status: SHIPPED | OUTPATIENT
Start: 2017-12-07 | End: 2017-11-07

## 2017-11-07 RX ORDER — HYDROCODONE BITARTRATE AND ACETAMINOPHEN 5; 325 MG/1; MG/1
1 TABLET ORAL 2 TIMES DAILY PRN
Qty: 60 TABLET | Refills: 0 | Status: SHIPPED | OUTPATIENT
Start: 2018-01-06 | End: 2018-02-16

## 2017-11-07 RX ORDER — HYDROCODONE BITARTRATE AND ACETAMINOPHEN 5; 325 MG/1; MG/1
1 TABLET ORAL 2 TIMES DAILY PRN
Qty: 60 TABLET | Refills: 0 | Status: SHIPPED | OUTPATIENT
Start: 2017-11-07 | End: 2017-11-07

## 2017-11-07 RX ORDER — PHENTERMINE HYDROCHLORIDE 15 MG/1
15 CAPSULE ORAL EVERY MORNING
Qty: 30 CAPSULE | Refills: 5 | Status: SHIPPED | OUTPATIENT
Start: 2017-11-07 | End: 2018-10-15

## 2017-11-07 RX ORDER — ESTRADIOL 1 MG/1
1 TABLET ORAL DAILY
Qty: 90 TABLET | Refills: 3 | Status: SHIPPED | OUTPATIENT
Start: 2017-11-07 | End: 2018-11-12

## 2017-11-07 ASSESSMENT — PAIN SCALES - GENERAL: PAINLEVEL: SEVERE PAIN (7)
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
## 2017-11-07 NOTE — MR AVS SNAPSHOT
"              After Visit Summary   2017    China Mckenzie    MRN: [id number]           Patient Information     Date Of Birth          1960        Visit Information        Provider Department      2017 10:00 AM Sreekanth Wiley MD [facility]        Today's Diagnoses     Weight gain    -  1    Arthropathy        Symptomatic menopausal or female climacteric states           Follow-ups after your visit        Who to contact     If you have questions or need follow up information about today's clinic visit or your schedule please contact [facility] directly at [phone].  Normal or non-critical lab and imaging results will be communicated to you by [facility], letter or phone within 4 business days after the clinic has received the results. If you do not hear from us within 7 days, please contact the clinic through [facility] or phone. If you have a critical or abnormal lab result, we will notify you by phone as soon as possible.  Submit refill requests through [employer] or call your pharmacy and they will forward the refill request to us. Please allow 3 business days for your refill to be completed.          Additional Information About Your Visit        MyChart Information     [employer] lets you send messages to your doctor, view your test results, renew your prescriptions, schedule appointments and more. To sign up, go to [email or web address] . Click on \"Log in\" on the left side of the screen, which will take you to the Welcome page. Then click on \"Sign up Now\" on the right side of the page.     You will be asked to enter the access code listed below, as well as some personal information. Please follow the directions to create your username and password.     Your access code is: [email or web address]  Expires: 2018 11:07 AM     Your access code will  in 90 days. If you need help or a new code, please call your [facility] or [phone].        Care " EveryWhere ID     This is your Care EveryWhere ID. This could be used by other organizations to access your [facility] medical records  [id number]        Your Vitals Were     Pulse Temperature Breastfeeding? BMI (Body Mass Index)          80 97.6  F (36.4  C) (Tympanic) No 26.01 kg/m2         Blood Pressure from Last 3 Encounters:   11/07/17 110/79   07/28/17 110/70   07/18/17 115/75    Weight from Last 3 Encounters:   11/07/17 116 lb (52.6 kg)   07/28/17 107 lb 9.6 oz (48.8 kg)   07/18/17 108 lb 6.4 oz (49.2 kg)              Today, you had the following     No orders found for display         Today's Medication Changes          These changes are accurate as of: 11/7/17 11:07 AM.  If you have any questions, ask your nurse or doctor.               Start taking these medicines.        Dose/Directions    HYDROcodone-acetaminophen 5-325 MG per tablet   Commonly known as:  NORCO   Used for:  Arthropathy   Started by:  Sreekanth Wiley MD        Dose:  1 tablet   Start taking on:  1/6/2018   Take 1 tablet by mouth 2 times daily as needed for other (Moderate to Severe Pain)   Quantity:  60 tablet   Refills:  0       phentermine 15 MG capsule   Used for:  Weight gain   Started by:  Sreekanth Wiley MD        Dose:  15 mg   Take 1 capsule (15 mg) by mouth every morning   Quantity:  30 capsule   Refills:  5            Where to get your medicines      [facility] [address]  [address], [place] [address]    Hours:  [facility] Phone:  [phone]     estradiol 1 MG tablet         Some of these will need a paper prescription and others can be bought over the counter.  Ask your nurse if you have questions.     Bring a paper prescription for each of these medications     HYDROcodone-acetaminophen 5-325 MG per tablet    phentermine 15 MG capsule                Primary Care Provider Office Phone # Fax #    Heather WHEELER  MD Jonah [phone]       [address] [facility] [address]        Equal Access to Services     LEIGHANN RAMSEY : Hadii aad ku hadcarltonlelo Fong, ashokdenisse hortonchuckha, marv lemike laws, quan wingbubba nirali. So [facility] [phone].    ATENCIÓN: Si habla español, tiene a blanchard disposición servicios gratuitos de asistencia lingüística. Llame al [phone].    We comply with applicable federal civil rights laws and [place] laws. We do not discriminate on the basis of race, color, national origin, age, disability, sex, sexual orientation, or gender identity.            Thank you!     Thank you for choosing [facility]  for your care. Our goal is always to provide you with excellent care. Hearing back from our patients is one way we can continue to improve our services. Please take a few minutes to complete the written survey that you may receive in the mail after your visit with us. Thank you!             Your Updated Medication List - Protect others around you: Learn how to safely use, store and throw away your medicines at www.disposemymeds.org.          This list is accurate as of: 11/7/17 11:07 AM.  Always use your most recent med list.                   Brand Name Dispense Instructions for use Diagnosis    CALCIUM PO      Take 600 mg by mouth        cyclobenzaprine 10 MG tablet    FLEXERIL    30 tablet    Take 1 tablet (10 mg) by mouth 3 times daily as needed for muscle spasms    Back muscle spasm        ULTRA STRENGTH 2000 UNITS Caps   Generic drug:  cholecalciferol      Take 2,000 Units by mouth        estradiol 1 MG tablet    ESTRACE    90 tablet    Take 1 tablet (1 mg) by mouth daily    Symptomatic menopausal or female climacteric states       FLUoxetine 20 MG capsule    PROzac    90 capsule    Take 1 capsule (20 mg) by mouth daily    Generalized anxiety disorder       gabapentin 300 MG capsule    NEURONTIN    540 capsule    Take 2 capsules  (600 mg) by mouth 3 times daily    Chronic pain syndrome       HYDROcodone-acetaminophen 5-325 MG per tablet   Start taking on:  1/6/2018    NORCO    60 tablet    Take 1 tablet by mouth 2 times daily as needed for other (Moderate to Severe Pain)    Arthropathy       meclizine 25 MG tablet    ANTIVERT    30 tablet    Take 0.5-1 tablets (12.5-25 mg) by mouth every 6 hours as needed for dizziness    BPPV (benign paroxysmal positional vertigo), unspecified laterality       phentermine 15 MG capsule     30 capsule    Take 1 capsule (15 mg) by mouth every morning    Weight gain       SUMAtriptan 100 MG tablet    IMITREX    18 tablet    Take 1 tablet (100 mg) by mouth at onset of headache for migraine May repeat in 2 hours if needed: max 2/day    Migraine without aura and without status migrainosus, not intractable       TESTOSTERONE IN VANICREAM 2% CREAM      Apply topically every morning

## 2017-11-07 NOTE — NURSING NOTE
"Chief Complaint   Patient presents with     Recheck Medication     pain meds for arthritis       Initial /79 (BP Location: Right arm, Patient Position: Chair, Cuff Size: Adult Regular)  Pulse 80  Temp 97.6  F (36.4  C) (Tympanic)  Wt 116 lb (52.6 kg)  Breastfeeding? No  BMI 26.01 kg/m2 Estimated body mass index is 26.01 kg/(m^2) as calculated from the following:    Height as of 5/3/17: 4' 8\" (1.422 m).    Weight as of this encounter: 116 lb (52.6 kg).  Medication Reconciliation: complete   Concetta Tran CMA       "

## 2017-12-12 DIAGNOSIS — N95.1 SYMPTOMATIC MENOPAUSAL OR FEMALE CLIMACTERIC STATES: ICD-10-CM

## 2017-12-12 RX ORDER — MEDROXYPROGESTERONE ACETATE 2.5 MG/1
2.5 TABLET ORAL DAILY
Qty: 90 TABLET | Refills: 3 | Status: SHIPPED | OUTPATIENT
Start: 2017-12-12 | End: 2018-11-12

## 2017-12-12 NOTE — TELEPHONE ENCOUNTER
Routing refill request to provider for review/approval because:  Drug not active on patient's medication list    Please review and advise.  Last office visit 6/9/16    Saw PCP 11/2017.    Thank you.    Ruth Gallego   [facility]  RN

## 2018-02-08 ENCOUNTER — TELEPHONE (OUTPATIENT)
Dept: FAMILY MEDICINE | Facility: CLINIC | Age: 58
End: 2018-02-08

## 2018-02-08 NOTE — TELEPHONE ENCOUNTER
Reason for Call:  Medication or medication refill:    Do you use a [facility]?  Name of the pharmacy and phone number for the current request:  [facility] [place]    Name of the medication requested: Something other than imitrex. Its not working    Phone number patient can be reached at: Home number on file [phone] (home)    Best Time: any    Call taken on 2/8/2018 at 3:57 PM by Ansley Wolf

## 2018-02-08 NOTE — TELEPHONE ENCOUNTER
Patient reports a migraine started 2 days ago.  She has gotten minimal relief from imitrex - took 3 yesterday and has done nothing.  Has taken 2 today and has not touched her pain.  She states her pain is unbearable.  She has not had an appetite in 2 days.  She denies any numbness/tingling, weakness, unsteady gait on one side of the body; fever; stiff neck; vomiting; or any other symptoms at this time.  RN advised due to pain she needs to be evaluated in ER.  Patient agrees with plan and verbalized understanding.    Brittni SCHROEDER RN

## 2018-02-16 ENCOUNTER — OFFICE VISIT (OUTPATIENT)
Dept: FAMILY MEDICINE | Facility: CLINIC | Age: 58
End: 2018-02-16
Payer: COMMERCIAL

## 2018-02-16 VITALS
TEMPERATURE: 97.2 F | BODY MASS INDEX: 24.75 KG/M2 | HEIGHT: 56 IN | SYSTOLIC BLOOD PRESSURE: 104 MMHG | DIASTOLIC BLOOD PRESSURE: 64 MMHG | HEART RATE: 76 BPM | RESPIRATION RATE: 16 BRPM | WEIGHT: 110 LBS

## 2018-02-16 DIAGNOSIS — G89.4 CHRONIC PAIN SYNDROME: Primary | ICD-10-CM

## 2018-02-16 DIAGNOSIS — M12.9 ARTHROPATHY: ICD-10-CM

## 2018-02-16 PROCEDURE — 99213 OFFICE O/P EST LOW 20 MIN: CPT | Performed by: NURSE PRACTITIONER

## 2018-02-16 RX ORDER — HYDROCODONE BITARTRATE AND ACETAMINOPHEN 5; 325 MG/1; MG/1
1 TABLET ORAL 2 TIMES DAILY PRN
Qty: 60 TABLET | Refills: 0 | Status: SHIPPED | OUTPATIENT
Start: 2018-02-16 | End: 2018-02-16

## 2018-02-16 RX ORDER — HYDROCODONE BITARTRATE AND ACETAMINOPHEN 5; 325 MG/1; MG/1
1 TABLET ORAL 2 TIMES DAILY PRN
Qty: 60 TABLET | Refills: 0 | Status: SHIPPED | OUTPATIENT
Start: 2018-04-16 | End: 2018-05-23

## 2018-02-16 RX ORDER — HYDROCODONE BITARTRATE AND ACETAMINOPHEN 5; 325 MG/1; MG/1
1 TABLET ORAL 2 TIMES DAILY PRN
Qty: 60 TABLET | Refills: 0 | Status: SHIPPED | OUTPATIENT
Start: 2018-03-16 | End: 2018-02-16

## 2018-02-16 NOTE — MR AVS SNAPSHOT
After Visit Summary   2/16/2018    China Mckenzie    MRN: [id number]           Patient Information     Date Of Birth          1960        Visit Information        Provider Department      2/16/2018 7:40 AM Ansley Lemus APRN CNP [facility]        Today's Diagnoses     Chronic pain syndrome    -  1    Arthropathy          Care Instructions    Refills of pain medications for the next 3 months.  Follow up if symptoms persist or worsen and as needed.        Thank you for choosing [facility].  You may be receiving a survey in the mail from [facility] regarding your visit today.  Please take a few minutes to complete and return the survey to let us know how we are doing.      Our Clinic hours are:  Mondays    7:20 am - 7 pm  Tues -  Fri  7:20 am - 5 pm    Clinic Phone: [phone]    The clinic lab opens at 7:30 am Mon - Fri and appointments are required.    [facility]  Ph. [phone]  Monday-Thursday 8 am - 7pm  Tues/Wed/Fri 8 am - 5:30 pm                 Follow-ups after your visit        Who to contact     If you have questions or need follow up information about today's clinic visit or your schedule please contact [facility] directly at [phone].  Normal or non-critical lab and imaging results will be communicated to you by [facility], letter or phone within 4 business days after the clinic has received the results. If you do not hear from us within 7 days, please contact the clinic through [employer] or phone. If you have a critical or abnormal lab result, we will notify you by phone as soon as possible.  Submit refill requests through [employer] or call your pharmacy and they will forward the refill request to us. Please allow 3 business days for your refill to be completed.          Additional Information About Your Visit        [employer] Information     [employer] lets you send messages to your doctor, view your test results,  "renew your prescriptions, schedule appointments and more. To sign up, go to [email or web address] . Click on \"Log in\" on the left side of the screen, which will take you to the Welcome page. Then click on \"Sign up Now\" on the right side of the page.     You will be asked to enter the access code listed below, as well as some personal information. Please follow the directions to create your username and password.     Your access code is: [email or web address]  Expires: 2018  8:14 AM     Your access code will  in 90 days. If you need help or a new code, please call your [place] clinic or [phone].        Care EveryWhere ID     This is your Care EveryWhere ID. This could be used by other organizations to access your [place] medical records  [id number]        Your Vitals Were     Pulse Temperature Respirations Height BMI (Body Mass Index)       76 97.2  F (36.2  C) (Oral) 16 4' 8\" (1.422 m) 24.66 kg/m2        Blood Pressure from Last 3 Encounters:   18 104/64   17 110/79   17 110/70    Weight from Last 3 Encounters:   18 110 lb (49.9 kg)   17 116 lb (52.6 kg)   17 107 lb 9.6 oz (48.8 kg)              Today, you had the following     No orders found for display         Today's Medication Changes          These changes are accurate as of 18  8:14 AM.  If you have any questions, ask your nurse or doctor.               Start taking these medicines.        Dose/Directions    HYDROcodone-acetaminophen 5-325 MG per tablet   Commonly known as:  NORCO   Used for:  Arthropathy   Started by:  Ansley Lemus APRN CNP        Dose:  1 tablet   Start taking on:  2018   Take 1 tablet by mouth 2 times daily as needed for other (Moderate to Severe Pain)   Quantity:  60 tablet   Refills:  0            Where to get your medicines      Some of these will need a paper prescription and others can be bought over the counter.  Ask your nurse if you have questions.     Bring a paper " prescription for each of these medications     HYDROcodone-acetaminophen 5-325 MG per tablet                Primary Care Provider Office Phone # Fax #    Heather Mckenzie [phone] [address] [facility] [address]        Equal Access to Services     PETERYLIE ROXY : Hadii naa barroso kellyo Soarmidaali, waaxda luqadaha, qaybta kaalmada adeegyada, quan eduardoin hayfilomenan adejae george geraldine campoverde. So [facility] [phone].    ATENCIÓN: Si habla español, tiene a blanchard disposición servicios gratuitos de asistencia lingüística. Llame al [phone].    We comply with applicable federal civil rights laws and [place] laws. We do not discriminate on the basis of race, color, national origin, age, disability, sex, sexual orientation, or gender identity.            Thank you!     Thank you for choosing [facility]  for your care. Our goal is always to provide you with excellent care. Hearing back from our patients is one way we can continue to improve our services. Please take a few minutes to complete the written survey that you may receive in the mail after your visit with us. Thank you!             Your Updated Medication List - Protect others around you: Learn how to safely use, store and throw away your medicines at www.disposemymeds.org.          This list is accurate as of 2/16/18  8:14 AM.  Always use your most recent med list.                   Brand Name Dispense Instructions for use Diagnosis    CALCIUM PO      Take 600 mg by mouth        cyclobenzaprine 10 MG tablet    FLEXERIL    30 tablet    Take 1 tablet (10 mg) by mouth 3 times daily as needed for muscle spasms    Back muscle spasm        ULTRA STRENGTH 2000 UNITS Caps   Generic drug:  cholecalciferol      Take 2,000 Units by mouth        estradiol 1 MG tablet    ESTRACE    90 tablet    Take 1 tablet (1 mg) by mouth daily    Symptomatic menopausal or female climacteric states       FLUoxetine 20 MG capsule    PROzac    90  capsule    Take 1 capsule (20 mg) by mouth daily    Generalized anxiety disorder       gabapentin 300 MG capsule    NEURONTIN    540 capsule    Take 2 capsules (600 mg) by mouth 3 times daily    Chronic pain syndrome       HYDROcodone-acetaminophen 5-325 MG per tablet   Start taking on:  4/16/2018    NORCO    60 tablet    Take 1 tablet by mouth 2 times daily as needed for other (Moderate to Severe Pain)    Arthropathy       meclizine 25 MG tablet    ANTIVERT    30 tablet    Take 0.5-1 tablets (12.5-25 mg) by mouth every 6 hours as needed for dizziness    BPPV (benign paroxysmal positional vertigo), unspecified laterality       medroxyPROGESTERone 2.5 MG tablet    PROVERA    90 tablet    Take 1 tablet (2.5 mg) by mouth daily    Symptomatic menopausal or female climacteric states       phentermine 15 MG capsule     30 capsule    Take 1 capsule (15 mg) by mouth every morning    Weight gain       SUMAtriptan 100 MG tablet    IMITREX    18 tablet    Take 1 tablet (100 mg) by mouth at onset of headache for migraine May repeat in 2 hours if needed: max 2/day    Migraine without aura and without status migrainosus, not intractable       TESTOSTERONE IN VANICREAM 2% CREAM      Apply topically every morning

## 2018-02-16 NOTE — NURSING NOTE
"Chief Complaint   Patient presents with     Pain       Initial /64 (BP Location: Right arm, Patient Position: Chair, Cuff Size: Adult Regular)  Pulse 76  Temp 97.2  F (36.2  C) (Oral)  Resp 16  Ht 4' 8\" (1.422 m)  Wt 110 lb (49.9 kg)  BMI 24.66 kg/m2 Estimated body mass index is 24.66 kg/(m^2) as calculated from the following:    Height as of this encounter: 4' 8\" (1.422 m).    Weight as of this encounter: 110 lb (49.9 kg).  Medication Reconciliation: complete  "

## 2018-02-16 NOTE — PATIENT INSTRUCTIONS
Refills of pain medications for the next 3 months.  Follow up if symptoms persist or worsen and as needed.        Thank you for choosing [facility].  You may be receiving a survey in the mail from [employer] [facility] regarding your visit today.  Please take a few minutes to complete and return the survey to let us know how we are doing.      Our Clinic hours are:  Mondays    7:20 am - 7 pm  Tues -  Fri  7:20 am - 5 pm    Clinic Phone: [phone]    The clinic lab opens at 7:30 am Mon - Fri and appointments are required.    [facility]. [phone]  Monday-Thursday 8 am - 7pm  Tues/Wed/Fri 8 am - 5:30 pm
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
## 2018-03-26 DIAGNOSIS — F41.1 GENERALIZED ANXIETY DISORDER: ICD-10-CM

## 2018-03-26 NOTE — TELEPHONE ENCOUNTER
Last Written Prescription Date:  10/16/2017  Last Fill Quantity: 90,  # refills: 1   Last office visit: 2/16/2018 with prescribing provider:  DOMO   Future Office Visit:

## 2018-05-23 ENCOUNTER — OFFICE VISIT (OUTPATIENT)
Dept: FAMILY MEDICINE | Facility: CLINIC | Age: 58
End: 2018-05-23
Payer: COMMERCIAL

## 2018-05-23 VITALS
SYSTOLIC BLOOD PRESSURE: 103 MMHG | RESPIRATION RATE: 12 BRPM | OXYGEN SATURATION: 96 % | WEIGHT: 110 LBS | BODY MASS INDEX: 24.75 KG/M2 | DIASTOLIC BLOOD PRESSURE: 58 MMHG | HEIGHT: 56 IN | HEART RATE: 71 BPM | TEMPERATURE: 97.5 F

## 2018-05-23 DIAGNOSIS — R30.0 DYSURIA: Primary | ICD-10-CM

## 2018-05-23 DIAGNOSIS — G89.29 CHRONIC BILATERAL THORACIC BACK PAIN: ICD-10-CM

## 2018-05-23 DIAGNOSIS — M54.6 CHRONIC BILATERAL THORACIC BACK PAIN: ICD-10-CM

## 2018-05-23 DIAGNOSIS — M12.9 ARTHROPATHY: ICD-10-CM

## 2018-05-23 PROCEDURE — 99214 OFFICE O/P EST MOD 30 MIN: CPT | Performed by: NURSE PRACTITIONER

## 2018-05-23 RX ORDER — HYDROCODONE BITARTRATE AND ACETAMINOPHEN 5; 325 MG/1; MG/1
1 TABLET ORAL 2 TIMES DAILY PRN
Qty: 60 TABLET | Refills: 0 | Status: SHIPPED | OUTPATIENT
Start: 2018-05-23 | End: 2018-07-19

## 2018-05-23 ASSESSMENT — PATIENT HEALTH QUESTIONNAIRE - PHQ9: 5. POOR APPETITE OR OVEREATING: SEVERAL DAYS

## 2018-05-23 ASSESSMENT — ANXIETY QUESTIONNAIRES
2. NOT BEING ABLE TO STOP OR CONTROL WORRYING: NOT AT ALL
7. FEELING AFRAID AS IF SOMETHING AWFUL MIGHT HAPPEN: NOT AT ALL
3. WORRYING TOO MUCH ABOUT DIFFERENT THINGS: NOT AT ALL
GAD7 TOTAL SCORE: 6
5. BEING SO RESTLESS THAT IT IS HARD TO SIT STILL: NEARLY EVERY DAY
1. FEELING NERVOUS, ANXIOUS, OR ON EDGE: SEVERAL DAYS
6. BECOMING EASILY ANNOYED OR IRRITABLE: SEVERAL DAYS
IF YOU CHECKED OFF ANY PROBLEMS ON THIS QUESTIONNAIRE, HOW DIFFICULT HAVE THESE PROBLEMS MADE IT FOR YOU TO DO YOUR WORK, TAKE CARE OF THINGS AT HOME, OR GET ALONG WITH OTHER PEOPLE: SOMEWHAT DIFFICULT

## 2018-05-23 ASSESSMENT — PAIN SCALES - GENERAL: PAINLEVEL: NO PAIN (0)

## 2018-05-23 NOTE — PATIENT INSTRUCTIONS
You will be contacted to schedule both the physical therapy and the acupuncture appointments    Return to clinic in a month
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
## 2018-05-23 NOTE — MR AVS SNAPSHOT
After Visit Summary   5/23/2018    China Mckenzie    MRN: [id number]           Patient Information     Date Of Birth          1960        Visit Information        Provider Department      5/23/2018 7:20 AM Lisa Quigley APRN CNP [facility]        Today's Diagnoses     Dysuria    -  1    Chronic bilateral thoracic back pain        Arthropathy          Care Instructions    You will be contacted to schedule both the physical therapy and the acupuncture appointments    Return to clinic in a month          Follow-ups after your visit        Additional Services     PAIN MANAGEMENT REFERRAL       Your provider has referred you to: [facility]: [facility] ( - Medical Acupuncture) - [place] [phone]   [email or web address]    **ANY DIAGNOSTIC TESTS THAT ARE NOT IN EPIC SHOULD BE SENT TO THE PAIN CENTER**    REGARDING OPIOID MEDICATIONS:  The discussion of opioids management, appropriateness of therapy, and dosing will be discussed in patients being seen for evaluation.  The pain management clinics are not long-term prescribing clinics, with transition of prescribing of medications ultimately going back to the referring provider/PCP.  If prescribing is taken over at the pain clinic, it is in actively involved patients whom are appropriate for opioids, urine drug screening is completed, and long-term prescribing plan has been determined.  Therefore, we will not be automatically taking over prescribing at the patient's first visit.  Is this agreeable to you? agrees.     Please be aware that coverage of these services is subject to the terms and limitations of your health insurance plan.  Call member services at your health plan with any benefit or coverage questions.      Please bring the following with you to your appointment:    (1) Any X-Rays, CTs or MRIs which have been performed.  Contact the facility where they were done to  "arrange for  prior to your scheduled appointment.    (2) List of current medications   (3) This referral request   (4) Any documents/labs given to you for this referral            PHYSICAL THERAPY REFERRAL       *This therapy referral will be filtered to a centralized scheduling office at [facility] and the patient will receive a call to schedule an appointment at a [facility] location most convenient for them. *     [facility] provides Physical Therapy evaluation and treatment and many specialty services across the [facility] system.  If requesting a specialty program, please choose from the list below.    If you have not heard from the scheduling office within 2 business days, please call [phone] for all locations, with the exception of [place], please call [phone] and [facility], please call [phone]  Treatment: Evaluation & Treatment  Special Instructions/Modalities: pain in upper back between scapula and knee pain  Special Programs: None    Please be aware that coverage of these services is subject to the terms and limitations of your health insurance plan.  Call member services at your health plan with any benefit or coverage questions.      **Note to Provider:  If you are referring outside of [facility] for the therapy appointment, please list the name of the location in the \"special instructions\" above, print the referral and give to the patient to schedule the appointment.                  Who to contact     If you have questions or need follow up information about today's clinic visit or your schedule please contact [facility] directly at [phone].  Normal or non-critical lab and imaging results will be communicated to you by MyChart, letter or phone within 4 business days after the clinic has received the results. If you do not hear from us within 7 days, please contact the clinic through MyChart or phone. If you have a " "critical or abnormal lab result, we will notify you by phone as soon as possible.  Submit refill requests through [employer] [facility] or call your pharmacy and they will forward the refill request to us. Please allow 3 business days for your refill to be completed.          Additional Information About Your Visit        Care EveryWhere ID     This is your Care EveryWhere ID. This could be used by other organizations to access your [facility] medical records  [id number]        Your Vitals Were     Pulse Temperature Respirations Height Pulse Oximetry Breastfeeding?    71 97.5  F (36.4  C) (Tympanic) 12 4' 8\" (1.422 m) 96% No    BMI (Body Mass Index)                   24.66 kg/m2            Blood Pressure from Last 3 Encounters:   05/23/18 103/58   02/16/18 104/64   11/07/17 110/79    Weight from Last 3 Encounters:   05/23/18 110 lb (49.9 kg)   02/16/18 110 lb (49.9 kg)   11/07/17 116 lb (52.6 kg)              We Performed the Following     *UA reflex to Microscopic and Culture ([place] and [facility] (except [place] and [place])     PAIN MANAGEMENT REFERRAL     PHYSICAL THERAPY REFERRAL          Where to get your medicines      Some of these will need a paper prescription and others can be bought over the counter.  Ask your nurse if you have questions.     Bring a paper prescription for each of these medications     HYDROcodone-acetaminophen 5-325 MG per tablet         Information about OPIOIDS     PRESCRIPTION OPIOIDS: WHAT YOU NEED TO KNOW   You have a prescription for an opioid (narcotic) pain medicine. Opioids can cause addiction. If you have a history of chemical dependency of any type, you are at a higher risk of becoming addicted to opioids. Only take this medicine after all other options have been tried. Take it for as short a time and as few doses as possible.     Do not:    Drive. If you drive while taking these medicines, you could be arrested for driving under the influence (DUI).    Operate heavy " machinery    Do any other dangerous activities while taking these medicines.     Drink any alcohol while taking these medicines.      Take with any other medicines that contain acetaminophen. Read all labels carefully. Look for the word  acetaminophen  or  Tylenol.  Ask your pharmacist if you have questions or are unsure.    Store your pills in a secure place, locked if possible. We will not replace any lost or stolen medicine. If you don t finish your medicine, please throw away (dispose) as directed by your pharmacist. The [place] Pollution Control Agency has more information about safe disposal: https://www.pca.[email or web address].mn.us/living-green/managing-unwanted-medications    All opioids tend to cause constipation. Drink plenty of water and eat foods that have a lot of fiber, such as fruits, vegetables, prune juice, apple juice and high-fiber cereal. Take a laxative (Miralax, milk of magnesia, Colace, Senna) if you don t move your bowels at least every other day.          Primary Care Provider Office Phone # Fax #    Heather Mckenzie [phone] [address] [facility] [address]        Equal Access to Services     [facility] CenterROGELIO : Hadii naa ku hadasho Somargret, waaxda luqadaha, qaybta kaalmada eusebia, quan campoverde. So [facility] [phone].    ATENCIÓN: Si habla español, tiene a lbanchard disposición servicios gratuitos de asistencia lingüística. Kaz al [phone].    We comply with applicable federal civil rights laws and [place] laws. We do not discriminate on the basis of race, color, national origin, age, disability, sex, sexual orientation, or gender identity.            Thank you!     Thank you for choosing [facility]  for your care. Our goal is always to provide you with excellent care. Hearing back from our patients is one way we can continue to improve our services. Please take a few minutes to complete the written survey that you may  receive in the mail after your visit with us. Thank you!             Your Updated Medication List - Protect others around you: Learn how to safely use, store and throw away your medicines at www.disposemymeds.org.          This list is accurate as of 5/23/18  8:20 AM.  Always use your most recent med list.                   Brand Name Dispense Instructions for use Diagnosis    CALCIUM PO      Take 600 mg by mouth        cyclobenzaprine 10 MG tablet    FLEXERIL    30 tablet    Take 1 tablet (10 mg) by mouth 3 times daily as needed for muscle spasms    Back muscle spasm        ULTRA STRENGTH 2000 units Caps   Generic drug:  cholecalciferol      Take 2,000 Units by mouth        estradiol 1 MG tablet    ESTRACE    90 tablet    Take 1 tablet (1 mg) by mouth daily    Symptomatic menopausal or female climacteric states       FLUoxetine 20 MG capsule    PROzac    90 capsule    Take 1 capsule (20 mg) by mouth daily    Generalized anxiety disorder       gabapentin 300 MG capsule    NEURONTIN    540 capsule    Take 2 capsules (600 mg) by mouth 3 times daily    Chronic pain syndrome       HYDROcodone-acetaminophen 5-325 MG per tablet    NORCO    60 tablet    Take 1 tablet by mouth 2 times daily as needed for other (Moderate to Severe Pain)    Arthropathy       meclizine 25 MG tablet    ANTIVERT    30 tablet    Take 0.5-1 tablets (12.5-25 mg) by mouth every 6 hours as needed for dizziness    BPPV (benign paroxysmal positional vertigo), unspecified laterality       medroxyPROGESTERone 2.5 MG tablet    PROVERA    90 tablet    Take 1 tablet (2.5 mg) by mouth daily    Symptomatic menopausal or female climacteric states       phentermine 15 MG capsule     30 capsule    Take 1 capsule (15 mg) by mouth every morning    Weight gain       SUMAtriptan 100 MG tablet    IMITREX    18 tablet    Take 1 tablet (100 mg) by mouth at onset of headache for migraine May repeat in 2 hours if needed: max 2/day    Migraine without aura and  without status migrainosus, not intractable       TESTOSTERONE IN VANICREAM 2% CREAM      Apply topically every morning

## 2018-05-24 ASSESSMENT — ANXIETY QUESTIONNAIRES: GAD7 TOTAL SCORE: 6

## 2018-05-24 ASSESSMENT — PATIENT HEALTH QUESTIONNAIRE - PHQ9: SUM OF ALL RESPONSES TO PHQ QUESTIONS 1-9: 13

## 2018-06-11 DIAGNOSIS — M62.830 BACK MUSCLE SPASM: ICD-10-CM

## 2018-06-11 NOTE — TELEPHONE ENCOUNTER
cyclobenzaprine (FLEXERIL) 10 MG tablet    Last Written Prescription Date:  10/16/2017  Last Fill Quantity: 30,   # refills: 5  Last Office Visit: 5/23/2018 With Nhi Quigley  Future Office visit:       Routing refill request to provider for review/approval because:  Drug not on the [facility], [facility] or [facility] refill protocol or controlled substance

## 2018-06-14 RX ORDER — CYCLOBENZAPRINE HCL 10 MG
10 TABLET ORAL 3 TIMES DAILY PRN
Qty: 30 TABLET | Refills: 5 | Status: SHIPPED | OUTPATIENT
Start: 2018-06-14 | End: 2018-12-12

## 2018-06-14 NOTE — TELEPHONE ENCOUNTER
Routing refill request to provider for review/approval because:  Drug not on the [facility] refill protocol     LOV with you 5/2017.  She is due for her annual.  Has seen other providers since 5/2017.  RN attempted to reach patient to notify due for annual physical however her VM full and cannot LM.    Brittni SCHROEDER RN

## 2018-07-19 ENCOUNTER — OFFICE VISIT (OUTPATIENT)
Dept: FAMILY MEDICINE | Facility: CLINIC | Age: 58
End: 2018-07-19
Payer: COMMERCIAL

## 2018-07-19 ENCOUNTER — TELEPHONE (OUTPATIENT)
Dept: FAMILY MEDICINE | Facility: CLINIC | Age: 58
End: 2018-07-19

## 2018-07-19 VITALS
SYSTOLIC BLOOD PRESSURE: 122 MMHG | WEIGHT: 108 LBS | TEMPERATURE: 96.8 F | RESPIRATION RATE: 18 BRPM | DIASTOLIC BLOOD PRESSURE: 72 MMHG | HEART RATE: 78 BPM | HEIGHT: 56 IN | BODY MASS INDEX: 24.3 KG/M2 | OXYGEN SATURATION: 98 %

## 2018-07-19 DIAGNOSIS — M12.9 ARTHROPATHY: ICD-10-CM

## 2018-07-19 PROCEDURE — 99214 OFFICE O/P EST MOD 30 MIN: CPT | Performed by: FAMILY MEDICINE

## 2018-07-19 RX ORDER — HYDROCODONE BITARTRATE AND ACETAMINOPHEN 5; 325 MG/1; MG/1
1 TABLET ORAL 2 TIMES DAILY PRN
Qty: 60 TABLET | Refills: 0 | Status: SHIPPED | OUTPATIENT
Start: 2018-07-19 | End: 2018-08-28

## 2018-07-19 ASSESSMENT — PAIN SCALES - GENERAL: PAINLEVEL: MODERATE PAIN (4)

## 2018-07-19 NOTE — TELEPHONE ENCOUNTER
Reason for Call:  Norco    Detailed comments: patient was seen today by Dr. ALBERTO Mckenzie and states that she usually gets 3 months worth of Rx's for her Norco. Today, she only received one. Please call and see if that is correct, to only get one.    Phone Number Patient can be reached at: Cell number on file:    Telephone Information:   Mobile [phone]       Best Time: any    Can we leave a detailed message on this number? YES   Carolann Sousa  Clinic [facility] [place] [facility]      Call taken on 7/19/2018 at 12:24 PM by Carolann Sousa

## 2018-07-19 NOTE — MR AVS SNAPSHOT
After Visit Summary   7/19/2018    China Mckenzie    MRN: [id number]           Patient Information     Date Of Birth          1960        Visit Information        Provider Department      7/19/2018 10:00 AM Heather Mckenzie MD [facility]        Today's Diagnoses     Arthropathy          Care Instructions          Thank you for choosing [facility] (formerly [facility]3].  You may be receiving a survey in the mail from [facility] regarding your visit today.  Please take a few minutes to complete and return the survey to let us know how we are doing.      Our Clinic hours are:  Mondays    7:20 am - 7 pm  Tues -  Fri  7:20 am - 5 pm    Clinic Phone: [phone]    The clinic lab opens at 7:30 am Mon - Fri and appointments are required.    [facility]  Ph. [phone]  Monday  8 am - 7pm  Tues - Fri 8 am - 5:30 pm                 Follow-ups after your visit        Your next 10 appointments already scheduled     Jul 31, 2018  9:45 AM CDT   New Visit with Michael Chery MD   [facility] ([facility])    [address]  [address] [place] [id number] [phone]              Who to contact     If you have questions or need follow up information about today's clinic visit or your schedule please contact [facility] directly at [phone].  Normal or non-critical lab and imaging results will be communicated to you by MyChart, letter or phone within 4 business days after the clinic has received the results. If you do not hear from us within 7 days, please contact the clinic through MyChart or phone. If you have a critical or abnormal lab result, we will notify you by phone as soon as possible.  Submit refill requests through [employer] or call your pharmacy and they will forward the refill request to us. Please allow 3 business days for your refill to be completed.          Additional Information About  "Your Visit        Care EveryWhere ID     This is your Care EveryWhere ID. This could be used by other organizations to access your [place] medical records  [id number]        Your Vitals Were     Pulse Temperature Respirations Height Pulse Oximetry Breastfeeding?    78 96.8  F (36  C) (Tympanic) 18 4' 8\" (1.422 m) 98% No    BMI (Body Mass Index)                   24.21 kg/m2            Blood Pressure from Last 3 Encounters:   07/19/18 122/72   05/23/18 103/58   02/16/18 104/64    Weight from Last 3 Encounters:   07/19/18 108 lb (49 kg)   05/23/18 110 lb (49.9 kg)   02/16/18 110 lb (49.9 kg)              Today, you had the following     No orders found for display         Where to get your medicines      Some of these will need a paper prescription and others can be bought over the counter.  Ask your nurse if you have questions.     Bring a paper prescription for each of these medications     HYDROcodone-acetaminophen 5-325 MG per tablet         Information about OPIOIDS     PRESCRIPTION OPIOIDS: WHAT YOU NEED TO KNOW   We gave you an opioid (narcotic) pain medicine. It is important to manage your pain, but opioids are not always the best choice. You should first try all the other options your care team gave you. Take this medicine for as short a time (and as few doses) as possible.     These medicines have risks:    DO NOT drive when on new or higher doses of pain medicine. These medicines can affect your alertness and reaction times, and you could be arrested for driving under the influence (DUI). If you need to use opioids long-term, talk to your care team about driving.    DO NOT operate heave machinery    DO NOT do any other dangerous activities while taking these medicines.     DO NOT drink any alcohol while taking these medicines.      If the opioid prescribed includes acetaminophen, DO NOT take with any other medicines that contain acetaminophen. Read all labels carefully. Look for the word  acetaminophen  " or  Tylenol.  Ask your pharmacist if you have questions or are unsure.    You can get addicted to pain medicines, especially if you have a history of addiction (chemical, alcohol or substance dependence). Talk to your care team about ways to reduce this risk.    Store your pills in a secure place, locked if possible. We will not replace any lost or stolen medicine. If you don t finish your medicine, please throw away (dispose) as directed by your pharmacist. The [place] Pollution Control Agency has more information about safe disposal: https://www.pca.[facility].mn.us/living-green/managing-unwanted-medications.     All opioids tend to cause constipation. Drink plenty of water and eat foods that have a lot of fiber, such as fruits, vegetables, prune juice, apple juice and high-fiber cereal. Take a laxative (Miralax, milk of magnesia, Colace, Senna) if you don t move your bowels at least every other day.          Primary Care Provider Office Phone # Fax #    Heather Mckenzie [phone] [address] [facility] [address]        Equal Access to Services     [facility] : Hadii naa barroso hadasho Somargret, waaxda luqadaha, qaybta kaalmadenisse laws, quan campoverde. So [facility] [phone].    ATENCIÓN: Si habla español, tiene a blanchard disposición servicios gratuitos de asistencia lingüística. Kaz al [phone].    We comply with applicable federal civil rights laws and [place] laws. We do not discriminate on the basis of race, color, national origin, age, disability, sex, sexual orientation, or gender identity.            Thank you!     Thank you for choosing [facility]  for your care. Our goal is always to provide you with excellent care. Hearing back from our patients is one way we can continue to improve our services. Please take a few minutes to complete the written survey that you may receive in the mail after your visit with us. Thank you!              Your Updated Medication List - Protect others around you: Learn how to safely use, store and throw away your medicines at www.disposemymeds.org.          This list is accurate as of 7/19/18 10:26 AM.  Always use your most recent med list.                   Brand Name Dispense Instructions for use Diagnosis    CALCIUM PO      Take 600 mg by mouth        cyclobenzaprine 10 MG tablet    FLEXERIL    30 tablet    Take 1 tablet (10 mg) by mouth 3 times daily as needed for muscle spasms    Back muscle spasm        ULTRA STRENGTH 2000 units Caps   Generic drug:  cholecalciferol      Take 2,000 Units by mouth        estradiol 1 MG tablet    ESTRACE    90 tablet    Take 1 tablet (1 mg) by mouth daily    Symptomatic menopausal or female climacteric states       FLUoxetine 20 MG capsule    PROzac    90 capsule    Take 1 capsule (20 mg) by mouth daily    Generalized anxiety disorder       gabapentin 300 MG capsule    NEURONTIN    540 capsule    Take 2 capsules (600 mg) by mouth 3 times daily    Chronic pain syndrome       HYDROcodone-acetaminophen 5-325 MG per tablet    NORCO    60 tablet    Take 1 tablet by mouth 2 times daily as needed for other (Moderate to Severe Pain)    Arthropathy       meclizine 25 MG tablet    ANTIVERT    30 tablet    Take 0.5-1 tablets (12.5-25 mg) by mouth every 6 hours as needed for dizziness    BPPV (benign paroxysmal positional vertigo), unspecified laterality       medroxyPROGESTERone 2.5 MG tablet    PROVERA    90 tablet    Take 1 tablet (2.5 mg) by mouth daily    Symptomatic menopausal or female climacteric states       phentermine 15 MG capsule     30 capsule    Take 1 capsule (15 mg) by mouth every morning    Weight gain       SUMAtriptan 100 MG tablet    IMITREX    18 tablet    Take 1 tablet (100 mg) by mouth at onset of headache for migraine May repeat in 2 hours if needed: max 2/day    Migraine without aura and without status migrainosus, not intractable       TESTOSTERONE IN  VANICREAM 2% CREAM      Apply topically every morning

## 2018-07-19 NOTE — TELEPHONE ENCOUNTER
Per OV notes, patient is not taking this medication on a daily basis and if she does use it is only at night.  #60 tablets will last her at least 2 months if using nightly and even longer if not using on a daily basis.  This was explained to the patient in good understanding.  Brittni SCHROEDER RN

## 2018-07-19 NOTE — PROGRESS NOTES
"  SUBJECTIVE:   China Mckenzie is a 57 year old female who presents to clinic today for the following health issues:      Chief Complaint   Patient presents with     Musculoskeletal Problem     Patient noticed X 1 month ago left hip starting to cause discomfort. Patient no longer has concerns or pain with hip.      Medication Request     Patient is here to have norco refilled.      Medication Reconciliation     patient has neil forgetting to take prozac     [place] disabled.      Using the Norco not even every day.  Using it just as needed.  Mostly at night to try to get to sleep.  Muscles seem to spasm in her back at night.    Tries the muscle relaxer at night.      Body mass index is 24.21 kg/(m^2).    Has not yet done accupuncture, but did find a clinic in [place] that does it so she's going to look into that.      Sometimes forgets to take her prozac \"sometimes I come home and just fall into bed exhausted\".      /72  Pulse 78  Temp 96.8  F (36  C) (Tympanic)  Resp 18  Ht 4' 8\" (1.422 m)  Wt 108 lb (49 kg)  SpO2 98%  Breastfeeding? No  BMI 24.21 kg/m2  EXAM: GENERAL APPEARANCE ADULT: Alert, no acute distress  PSYCH: mentation appears normal., affect and mood normal    ASSESSMENT/PLAN:      ICD-10-CM    1. Arthropathy M12.9 HYDROcodone-acetaminophen (NORCO) 5-325 MG per tablet     Encouraged to see accupuncturist and do some PT.  Encouraged that she is not using as much norco as previously, the gabapentin seems to be working well.     Heather Mckenzie M.D.      Patient Instructions         Thank you for choosing [facility].  You may be receiving a survey in the mail from [employer] regarding your visit today.  Please take a few minutes to complete and return the survey to let us know how we are doing.      Our Clinic hours are:  Mondays    7:20 am - 7 pm  Tues -  Fri  7:20 am - 5 pm    Clinic Phone: [phone]    The clinic lab opens at 7:30 am Mon - Fri " and appointments are required.    [facility]  Ph. [phone]  Monday  8 am - 7pm  Tues - Fri 8 am - 5:30 pm

## 2018-07-19 NOTE — PATIENT INSTRUCTIONS
Thank you for choosing [facility].  You may be receiving a survey in the mail from [facility] regarding your visit today.  Please take a few minutes to complete and return the survey to let us know how we are doing.      Our Clinic hours are:  Mondays    7:20 am - 7 pm  Tues - Fri  7:20 am - 5 pm    Clinic Phone: [phone]    The clinic lab opens at 7:30 am Mon - Fri and appointments are required.    [facility]. [phone]  Monday  8 am - 7pm  Tues - Fri 8 am - 5:30 pm

## 2018-08-28 DIAGNOSIS — M12.9 ARTHROPATHY: ICD-10-CM

## 2018-08-28 NOTE — TELEPHONE ENCOUNTER
Requested Prescriptions   Pending Prescriptions Disp Refills     HYDROcodone-acetaminophen (NORCO) 5-325 MG per tablet 60 tablet 0     Sig: Take 1 tablet by mouth 2 times daily as needed for other (Moderate to Severe Pain)    Last Written Prescription Date:  7/19/2018  Last Fill Quantity: 60,   # refills: 0  Last Office Visit: 7/19/2018 with Jonah   Future Office visit:    Next 5 appointments (look out 90 days)     Oct 17, 2018  9:20 AM CDT   Office Visit with Heather Mckenzie MD   [facility]    [address] [facility] [address]   [phone]                   Routing refill request to provider for review/approval because:  Drug not on the [facility], [facility] or [facility] refill protocol or controlled substance

## 2018-08-29 RX ORDER — HYDROCODONE BITARTRATE AND ACETAMINOPHEN 5; 325 MG/1; MG/1
1 TABLET ORAL 2 TIMES DAILY PRN
Qty: 60 TABLET | Refills: 0 | Status: SHIPPED | OUTPATIENT
Start: 2018-08-29 | End: 2018-09-24

## 2018-09-05 ENCOUNTER — TELEPHONE (OUTPATIENT)
Dept: FAMILY MEDICINE | Facility: CLINIC | Age: 58
End: 2018-09-05

## 2018-09-05 NOTE — TELEPHONE ENCOUNTER
Reason for call:  Patient reporting a symptom    Symptom or request: uti symptoms     Duration (how long have symptoms been present): this morning    Have you been treated for this before? No    Additional comments: pt thinks she has a uti. She has burning, urgency and frequency. She is wondering what to do.    Phone Number patient can be reached at:  Home number on file [phone] (home)    Best Time:  any    Can we leave a detailed message on this number:  YES    Call taken on 9/5/2018 at 2:42 PM by Roz Mccray

## 2018-09-05 NOTE — TELEPHONE ENCOUNTER
S-(situation): Patient started to have UTI symptoms this morning.    B-(background): Patient has UTI symtpoms    A-(assessment): patient states she has burning, urgency and frequency.  Patient has some abdominal pain and bloating. Patient denies any fevers, nausea or vomiting.     R-(recommendations): Patient was advised to be seen for further evaluation. Patient was advised to go to UC/ER if she can not get appointment. Patient agrees with plan and understands.    Etelvina CRAFT RN

## 2018-09-06 ENCOUNTER — OFFICE VISIT (OUTPATIENT)
Dept: FAMILY MEDICINE | Facility: CLINIC | Age: 58
End: 2018-09-06
Payer: COMMERCIAL

## 2018-09-06 VITALS
HEIGHT: 56 IN | DIASTOLIC BLOOD PRESSURE: 72 MMHG | TEMPERATURE: 97.4 F | RESPIRATION RATE: 18 BRPM | HEART RATE: 74 BPM | OXYGEN SATURATION: 98 % | SYSTOLIC BLOOD PRESSURE: 114 MMHG

## 2018-09-06 DIAGNOSIS — R30.0 DYSURIA: ICD-10-CM

## 2018-09-06 DIAGNOSIS — N39.0 URINARY TRACT INFECTION, ACUTE: Primary | ICD-10-CM

## 2018-09-06 LAB
ALBUMIN UR-MCNC: ABNORMAL MG/DL
APPEARANCE UR: CLEAR
BACTERIA #/AREA URNS HPF: ABNORMAL /HPF
BILIRUB UR QL STRIP: NEGATIVE
COLOR UR AUTO: YELLOW
GLUCOSE UR STRIP-MCNC: NEGATIVE MG/DL
HGB UR QL STRIP: ABNORMAL
KETONES UR STRIP-MCNC: NEGATIVE MG/DL
LEUKOCYTE ESTERASE UR QL STRIP: ABNORMAL
NITRATE UR QL: NEGATIVE
NON-SQ EPI CELLS #/AREA URNS LPF: ABNORMAL /LPF
PH UR STRIP: 6.5 PH (ref 5–7)
RBC #/AREA URNS AUTO: ABNORMAL /HPF
SOURCE: ABNORMAL
SP GR UR STRIP: 1.02 (ref 1–1.03)
UROBILINOGEN UR STRIP-ACNC: 0.2 EU/DL (ref 0.2–1)
WBC #/AREA URNS AUTO: ABNORMAL /HPF

## 2018-09-06 PROCEDURE — 99213 OFFICE O/P EST LOW 20 MIN: CPT | Performed by: FAMILY MEDICINE

## 2018-09-06 PROCEDURE — 87086 URINE CULTURE/COLONY COUNT: CPT | Performed by: FAMILY MEDICINE

## 2018-09-06 PROCEDURE — 81001 URINALYSIS AUTO W/SCOPE: CPT | Performed by: FAMILY MEDICINE

## 2018-09-06 RX ORDER — SULFAMETHOXAZOLE/TRIMETHOPRIM 800-160 MG
1 TABLET ORAL 2 TIMES DAILY
Qty: 6 TABLET | Refills: 0 | Status: SHIPPED | OUTPATIENT
Start: 2018-09-06 | End: 2018-09-09

## 2018-09-06 ASSESSMENT — PAIN SCALES - GENERAL: PAINLEVEL: NO PAIN (0)

## 2018-09-06 NOTE — PATIENT INSTRUCTIONS
Thank you for choosing [facility].  You may be receiving a survey in the mail from [facility] regarding your visit today.  Please take a few minutes to complete and return the survey to let us know how we are doing.      Our Clinic hours are:  Mondays    7:20 am - 7 pm  Tues - Fri  7:20 am - 5 pm    Clinic Phone: [phone]    The clinic lab opens at 7:30 am Mon - Fri and appointments are required.    [facility]. [phone]  Monday  8 am - 7pm  Tues - Fri 8 am - 5:30 pm

## 2018-09-06 NOTE — MR AVS SNAPSHOT
After Visit Summary   9/6/2018    China Mckenzie    MRN: [id number]           Patient Information     Date Of Birth          1960        Visit Information        Provider Department      9/6/2018 10:20 AM Heather Mckenzie MD [facility] [place]        Today's Diagnoses     Urinary tract infection, acute    -  1    Dysuria          Care Instructions          Thank you for choosing [facility].  You may be receiving a survey in the mail from [employer] regarding your visit today.  Please take a few minutes to complete and return the survey to let us know how we are doing.      Our Clinic hours are:  Mondays    7:20 am - 7 pm  Tues -  Fri  7:20 am - 5 pm    Clinic Phone: [phone]    The clinic lab opens at 7:30 am Mon - Fri and appointments are required.    [facility]  Ph. [phone]  Monday  8 am - 7pm  Tues - Fri 8 am - 5:30 pm                 Follow-ups after your visit        Your next 10 appointments already scheduled     Oct 17, 2018  9:20 AM CDT   Office Visit with Heather Mckenzie MD   [facility] ([facility])    [address] [facility] [address]   [phone]           Bring a current list of meds and any records pertaining to this visit. For Physicals, please bring immunization records and any forms needing to be filled out. Please arrive 10 minutes early to complete paperwork.              Who to contact     If you have questions or need follow up information about today's clinic visit or your schedule please contact [facility] directly at [phone].  Normal or non-critical lab and imaging results will be communicated to you by MyChart, letter or phone within 4 business days after the clinic has received the results. If you do not hear from us within 7 days, please contact the clinic through MyChart or phone. If you have a critical or abnormal lab result, we will notify you  "by phone as soon as possible.  Submit refill requests through [employer] or call your pharmacy and they will forward the refill request to us. Please allow 3 business days for your refill to be completed.          Additional Information About Your Visit        [facility] Information     [employer] lets you send messages to your doctor, view your test results, renew your prescriptions, schedule appointments and more. To sign up, go to [email or web address] . Click on \"Log in\" on the left side of the screen, which will take you to the Welcome page. Then click on \"Sign up Now\" on the right side of the page.     You will be asked to enter the access code listed below, as well as some personal information. Please follow the directions to create your username and password.     Your access code is: [email or web address]  Expires: 2018 10:13 AM     Your access code will  in 90 days. If you need help or a new code, please call your [place] clinic or [phone].        Care EveryWhere ID     This is your Care EveryWhere ID. This could be used by other organizations to access your [place] medical records  [id number]        Your Vitals Were     Pulse Temperature Respirations Height Pulse Oximetry Breastfeeding?    74 97.4  F (36.3  C) (Tympanic) 18 4' 8\" (1.422 m) 98% No       Blood Pressure from Last 3 Encounters:   18 114/72   18 122/72   18 103/58    Weight from Last 3 Encounters:   18 108 lb (49 kg)   18 110 lb (49.9 kg)   18 110 lb (49.9 kg)              We Performed the Following     *UA reflex to Microscopic and Culture ([place] and [facility] (except [place] and [place])     Urine Culture Aerobic Bacterial     Urine Microscopic          Today's Medication Changes          These changes are accurate as of 18 10:38 AM.  If you have any questions, ask your nurse or doctor.               Start taking these medicines.        Dose/Directions    sulfamethoxazole-trimethoprim " 800-160 MG per tablet   Commonly known as:  BACTRIM DS/SEPTRA DS   Used for:  Urinary tract infection, acute   Started by:  Heather Mckenzie MD        Dose:  1 tablet   Take 1 tablet by mouth 2 times daily for 3 days   Quantity:  6 tablet   Refills:  0            Where to get your medicines      These medications were sent to [facility] - - [place] - [id number] [place] [address]    Hours:  [facility] Phone:  [phone]     sulfamethoxazole-trimethoprim 800-160 MG per tablet                Primary Care Provider Office Phone # Fax #    Heather Mckenzie [phone] [address] [facility] [address]        Equal Access to Services     RYLIE RAMSEY AH: Hadii naa barroso hadasho Soomaali, waaxda luqadaha, qaybta kaalmada adeegyada, waxay idiin hayjose a chandler . So [facility] [phone].    ATENCIÓN: Si habla español, tiene a blanchard disposición servicios gratuitos de asistencia lingüística. Llame al [phone].    We comply with applicable federal civil rights laws and [place] laws. We do not discriminate on the basis of race, color, national origin, age, disability, sex, sexual orientation, or gender identity.            Thank you!     Thank you for choosing [facility]  for your care. Our goal is always to provide you with excellent care. Hearing back from our patients is one way we can continue to improve our services. Please take a few minutes to complete the written survey that you may receive in the mail after your visit with us. Thank you!             Your Updated Medication List - Protect others around you: Learn how to safely use, store and throw away your medicines at www.disposemymeds.org.          This list is accurate as of 9/6/18 10:38 AM.  Always use your most recent med list.                   Brand Name Dispense Instructions for use Diagnosis    CALCIUM PO      Take 600 mg by  mouth        cyclobenzaprine 10 MG tablet    FLEXERIL    30 tablet    Take 1 tablet (10 mg) by mouth 3 times daily as needed for muscle spasms    Back muscle spasm        ULTRA STRENGTH 2000 units Caps   Generic drug:  cholecalciferol      Take 2,000 Units by mouth        estradiol 1 MG tablet    ESTRACE    90 tablet    Take 1 tablet (1 mg) by mouth daily    Symptomatic menopausal or female climacteric states       FLUoxetine 20 MG capsule    PROzac    90 capsule    Take 1 capsule (20 mg) by mouth daily    Generalized anxiety disorder       gabapentin 300 MG capsule    NEURONTIN    540 capsule    Take 2 capsules (600 mg) by mouth 3 times daily    Chronic pain syndrome       HYDROcodone-acetaminophen 5-325 MG per tablet    NORCO    60 tablet    Take 1 tablet by mouth 2 times daily as needed for other (Moderate to Severe Pain)    Arthropathy       meclizine 25 MG tablet    ANTIVERT    30 tablet    Take 0.5-1 tablets (12.5-25 mg) by mouth every 6 hours as needed for dizziness    BPPV (benign paroxysmal positional vertigo), unspecified laterality       medroxyPROGESTERone 2.5 MG tablet    PROVERA    90 tablet    Take 1 tablet (2.5 mg) by mouth daily    Symptomatic menopausal or female climacteric states       phentermine 15 MG capsule     30 capsule    Take 1 capsule (15 mg) by mouth every morning    Weight gain       sulfamethoxazole-trimethoprim 800-160 MG per tablet    BACTRIM DS/SEPTRA DS    6 tablet    Take 1 tablet by mouth 2 times daily for 3 days    Urinary tract infection, acute       SUMAtriptan 100 MG tablet    IMITREX    18 tablet    Take 1 tablet (100 mg) by mouth at onset of headache for migraine May repeat in 2 hours if needed: max 2/day    Migraine without aura and without status migrainosus, not intractable       TESTOSTERONE IN VANICREAM 2% CREAM      Apply topically every morning

## 2018-09-06 NOTE — PROGRESS NOTES
"  SUBJECTIVE:   China Mckenzie is a 57 year old female who presents to clinic today for the following health issues:      URINARY TRACT SYMPTOMS  Onset: yesterday had pain in abdomen and while urinating nothing currently. Similar symptoms happened last month and she cancelled.     Description:   Painful urination (Dysuria): no   Blood in urine (Hematuria): YES- yesterday  Delay in urine (Hesitency): no     Intensity: none    Progression of Symptoms:  intermittent    Accompanying Signs & Symptoms:  Fever/chills: no   Flank pain YES yesterday only  Nausea and vomiting: no   Any vaginal symptoms: none  Abdominal/Pelvic Pain: YES- yesterday only    History:   History of frequent UTI's: no   History of kidney stones: no   Sexually Active: YES  Possibility of pregnancy: No    Precipitating factors:       Therapies Tried and outcome: Increase fluid intake       /72  Pulse 74  Temp 97.4  F (36.3  C) (Tympanic)  Resp 18  Ht 4' 8\" (1.422 m)  SpO2 98%  Breastfeeding? No  EXAM: GENERAL APPEARANCE ADULT: Alert, no acute distress  ABDOMEN: soft, no organomegaly, masses or tenderness, no CVAT    Results for orders placed or performed in visit on 09/06/18   *UA reflex to Microscopic and Culture ([place] and [facility] (except [place] and [place])   Result Value Ref Range    Color Urine Yellow     Appearance Urine Clear     Glucose Urine Negative NEG^Negative mg/dL    Bilirubin Urine Negative NEG^Negative    Ketones Urine Negative NEG^Negative mg/dL    Specific Gravity Urine 1.020 1.003 - 1.035    Blood Urine Moderate (A) NEG^Negative    pH Urine 6.5 5.0 - 7.0 pH    Protein Albumin Urine Trace (A) NEG^Negative mg/dL    Urobilinogen Urine 0.2 0.2 - 1.0 EU/dL    Nitrite Urine Negative NEG^Negative    Leukocyte Esterase Urine Trace (A) NEG^Negative    Source Midstream Urine    Urine Microscopic   Result Value Ref Range    WBC Urine 5-10 (A) OTO5^0 - 5 /HPF    RBC Urine 2-5 (A) OTO2^O - 2 /HPF    Squamous " Epithelial /LPF Urine Few FEW^Few /LPF    Bacteria Urine Few (A) NEG^Negative /HPF     ASSESSMENT/PLAN:      ICD-10-CM    1. Urinary tract infection, acute N39.0 sulfamethoxazole-trimethoprim (BACTRIM DS/SEPTRA DS) 800-160 MG per tablet   2. Dysuria R30.0 *UA reflex to Microscopic and Culture ([place] and [facility] (except [place] and [place])     Urine Microscopic     Urine Culture Aerobic Bacterial     Discussed  prevention of URINARY TRACT INFECTION and the importance of avoiding chemical irritants, clothing and hygiene products that precipitate urinary infection.  The use of cranberry tablets rather than cranberry juice (with excess sugar) to acidify urine is recommended to decrease bacterial growth.   She is discouraged from wearing clothing or hygiene products that hold moisture next to skin.  Practice good personal hygiene:   Bathe with mild soap  Wear all-cotton underwear   Change underwear and pantyhose every day.   Avoid wearing pantyhose or tights for too many hours, especially in hot, humid weather.   Use deodorant-free white toilet paper to avoid perfume and dye that might irritate.   Avoid using feminine hygiene products (such as sprays and powders) and bath additives (such as bubble baths and oils).    Heather Mckenzie M.D.      Patient Instructions         Thank you for choosing [facility].  You may be receiving a survey in the mail from [facility] regarding your visit today.  Please take a few minutes to complete and return the survey to let us know how we are doing.      Our Clinic hours are:  Mondays    7:20 am - 7 pm  Tues -  Fri  7:20 am - 5 pm    Clinic Phone: [phone]    The clinic lab opens at 7:30 am Mon - Fri and appointments are required.    [facility]. [phone]  Monday  8 am - 7pm  Tues - Fri 8 am - 5:30 pm

## 2018-09-06 NOTE — LETTER
[facility]  [address]  [facility] [address]  Phone: [phone]      9/10/2018     China Mckenzie  [address] [place] [address]      Dear China:    Thank you for allowing me to participate in your care. Your recent test results were reviewed and listed below.  Urine culture is negative.  If symptoms persist, please follow up in clinic.    Your results are provided below for your review  Results for orders placed or performed in visit on 09/06/18   *UA reflex to Microscopic and Culture ([place] and [facility] (except [place] and [place])   Result Value Ref Range    Color Urine Yellow     Appearance Urine Clear     Glucose Urine Negative NEG^Negative mg/dL    Bilirubin Urine Negative NEG^Negative    Ketones Urine Negative NEG^Negative mg/dL    Specific Gravity Urine 1.020 1.003 - 1.035    Blood Urine Moderate (A) NEG^Negative    pH Urine 6.5 5.0 - 7.0 pH    Protein Albumin Urine Trace (A) NEG^Negative mg/dL    Urobilinogen Urine 0.2 0.2 - 1.0 EU/dL    Nitrite Urine Negative NEG^Negative    Leukocyte Esterase Urine Trace (A) NEG^Negative    Source Midstream Urine    Urine Microscopic   Result Value Ref Range    WBC Urine 5-10 (A) OTO5^0 - 5 /HPF    RBC Urine 2-5 (A) OTO2^O - 2 /HPF    Squamous Epithelial /LPF Urine Few FEW^Few /LPF    Bacteria Urine Few (A) NEG^Negative /HPF   Urine Culture Aerobic Bacterial   Result Value Ref Range    Specimen Description Midstream Urine     Special Requests Specimen received in preservative     Culture Micro       >100,000 colonies/mL  mixed urogenital yodit  Susceptibility testing not routinely done                   Thank you for choosing [facility]. As a result, please continue with the treatment plan discussed in the office. Return as discussed or sooner if symptoms worsen or fail to improve.     If you have any further questions or concerns, please do not hesitate to contact us.      Sincerely,        Dr. Heather Mckenzie

## 2018-09-07 LAB
BACTERIA SPEC CULT: NORMAL
Lab: NORMAL
SPECIMEN SOURCE: NORMAL

## 2018-09-24 DIAGNOSIS — M12.9 ARTHROPATHY: ICD-10-CM

## 2018-09-24 NOTE — TELEPHONE ENCOUNTER
Requested Prescriptions   Pending Prescriptions Disp Refills     HYDROcodone-acetaminophen (NORCO) 5-325 MG per tablet 60 tablet 0     Sig: Take 1 tablet by mouth 2 times daily as needed    Last Written Prescription Date:  8/29/2018  Last Fill Quantity: 60,   # refills: 0  Last Office Visit: 9/6/2018 with    Future Office visit:    Next 5 appointments (look out 90 days)     Oct 17, 2018  9:20 AM CDT   Office Visit with Heather Mckenzie MD   [facility] ([facility])    [address] [facility] [address]   [phone]                   Routing refill request to provider for review/approval because:  Drug not on the [facility], [facility] or [facility] refill protocol or controlled substance

## 2018-09-24 NOTE — TELEPHONE ENCOUNTER
Attempted to reach patient by phone.  Patient has a full mailbox, not able to leave message.    Is patient out of the medication?    Etelvina CRAFT RN

## 2018-09-25 NOTE — TELEPHONE ENCOUNTER
Dr. Mckenzie,    Just checking with you.  Patient was seen on 9/6/18 for UTI but chronic pain not addressed.  LOV for chronic pain eval 5/23/18.  Per contract seen every 3 months.  No CSA on file.  Office visit? Please advise. Lisbet COMER RN      +++ Unable to leave message for patient as her mailbox is full. Lisbet COMER RN

## 2018-09-26 RX ORDER — HYDROCODONE BITARTRATE AND ACETAMINOPHEN 5; 325 MG/1; MG/1
1 TABLET ORAL 2 TIMES DAILY PRN
Qty: 60 TABLET | Refills: 0 | Status: SHIPPED | OUTPATIENT
Start: 2018-09-26 | End: 2018-10-15

## 2018-09-26 NOTE — TELEPHONE ENCOUNTER
Refilled x 1, but agree, does need an appointment for follow up before this prescription runs out.    Heather Mckenzie M.D.

## 2018-10-03 ENCOUNTER — TELEPHONE (OUTPATIENT)
Dept: FAMILY MEDICINE | Facility: CLINIC | Age: 58
End: 2018-10-03

## 2018-10-03 DIAGNOSIS — G43.009 MIGRAINE WITHOUT AURA AND WITHOUT STATUS MIGRAINOSUS, NOT INTRACTABLE: ICD-10-CM

## 2018-10-03 RX ORDER — SUMATRIPTAN 100 MG/1
100 TABLET, FILM COATED ORAL
Qty: 18 TABLET | Refills: 5 | Status: CANCELLED | OUTPATIENT
Start: 2018-10-03

## 2018-10-03 NOTE — TELEPHONE ENCOUNTER
"Requested Prescriptions   Pending Prescriptions Disp Refills     SUMAtriptan (IMITREX) 100 MG tablet  Last Written Prescription Date:  07/28/2017  Last Fill Quantity: 18,  # refills: 5   Last office visit: 9/6/2018 with prescribing provider:  gonzalez   Future Office Visit:   Next 5 appointments (look out 90 days)     Oct 15, 2018  1:20 PM CDT   SHORT with Heather Mckenzie MD   [facility] ([facility])    [address]  [facility] [address]   [phone]                  18 tablet 5     Sig: Take 1 tablet (100 mg) by mouth at onset of headache for migraine May repeat in 2 hours if needed: max 2/day    Serotonin Agonists Failed    10/3/2018 10:52 AM       Failed - Serotonin Agonist request needs review.    Please review patient's record. If patient has had 8 or more treatments in the past month, please forward to provider.         Passed - Blood pressure under 140/90 in past 12 months    BP Readings from Last 3 Encounters:   09/06/18 114/72   07/19/18 122/72   05/23/18 103/58                Passed - Recent (12 mo) or future (30 days) visit within the authorizing provider's specialty    Patient had office visit in the last 12 months or has a visit in the next 30 days with authorizing provider or within the authorizing provider's specialty.  See \"Patient Info\" tab in inbasket, or \"Choose Columns\" in Meds & Orders section of the refill encounter.           Passed - Patient is age 18 or older       Passed - No active pregnancy on record       Passed - No positive pregnancy test in past 12 months        Jason Angela RT (R)    "

## 2018-10-03 NOTE — TELEPHONE ENCOUNTER
Left message for patient to call back at [phone]    We need to know if patient has had 8 or more treatments in the past month.  Maritza CORONEL RN

## 2018-10-05 ENCOUNTER — TELEPHONE (OUTPATIENT)
Dept: NURSING | Facility: CLINIC | Age: 58
End: 2018-10-05

## 2018-10-05 ENCOUNTER — NURSE TRIAGE (OUTPATIENT)
Dept: NURSING | Facility: CLINIC | Age: 58
End: 2018-10-05

## 2018-10-05 DIAGNOSIS — G43.009 MIGRAINE WITHOUT AURA AND WITHOUT STATUS MIGRAINOSUS, NOT INTRACTABLE: ICD-10-CM

## 2018-10-05 RX ORDER — SUMATRIPTAN 100 MG/1
100 TABLET, FILM COATED ORAL
Qty: 18 TABLET | Refills: 0 | Status: SHIPPED | OUTPATIENT
Start: 2018-10-05 | End: 2018-10-15

## 2018-10-05 NOTE — TELEPHONE ENCOUNTER
"Patient is requesting her imitrex.  FNA advised rx was not sent and patient is to see her doctor on the 15th.  Patient says she she cannot wait until then.  FNA informed will refill x one but patient is to go to appointment to review her use of imitrex.  Patient verbalized understanding.      Additional Information    Negative: Drug overdose and nurse unable to answer question    Negative: Caller requesting information not related to medicine    Negative: Caller requesting a prescription for Strep throat and has a positive culture result    Negative: Rash while taking a medication or within 3 days of stopping it    Negative: Immunization reaction suspected    Negative: [1] Asthma and [2] having symptoms of asthma (cough, wheezing, etc)    Negative: MORE THAN A DOUBLE DOSE of a prescription or over-the-counter (OTC) drug    Negative: [1] DOUBLE DOSE (an extra dose or lesser amount) of over-the-counter (OTC) drug AND [2] any symptoms (e.g., dizziness, nausea, pain, sleepiness)    Negative: [1] DOUBLE DOSE (an extra dose or lesser amount) of prescription drug AND [2] any symptoms (e.g., dizziness, nausea, pain, sleepiness)    Negative: Took another person's prescription drug    Negative: [1] DOUBLE DOSE (an extra dose or lesser amount) of prescription drug AND [2] NO symptoms (Exception: a double dose of antibiotics)    Negative: Diabetes drug error or overdose (e.g., insulin or extra dose)    Negative: [1] Request for URGENT new prescription or refill of \"essential\" medication (i.e., likelihood of harm to patient if not taken) AND [2] triager unable to fill per unit policy    Negative: [1] Prescription not at pharmacy AND [2] was prescribed today by PCP    Negative: Pharmacy calling with prescription questions and triager unable to answer question    Negative: Caller has URGENT medication question about med that PCP prescribed and triager unable to answer question    Negative: Caller has NON-URGENT medication question " about med that PCP prescribed and triager unable to answer question    Negative: Caller requesting a NON-URGENT new prescription or refill and triager unable to refill per unit policy    Negative: Caller has medication question about med not prescribed by PCP and triager unable to answer question (e.g., compatibility with other med, storage)    Negative: Caller has medication question only, adult not sick, and triager answers question    Negative: Caller has medication question, adult has minor symptoms, caller declines triage, and triager answers question    Negative: Caller requesting information about medication during pregnancy; adult is not ill and triager answers question    Negative: Caller requesting information about medication use with breastfeeding; neither adult nor infant is ill, and triager answers question    Caller requesting a refill, no triage required, and triager able to refill per unit policy    Protocols used: MEDICATION QUESTION CALL-ADULT-

## 2018-10-06 NOTE — TELEPHONE ENCOUNTER
Patient is requesting her imitrex.  FNA advised rx was not sent and patient is to see her doctor on the 15th.  Patient says she she cannot wait until then.  Previous Rx was prescribed on 7/28/17 with 5 refills. FNA informed will refill x one with no refills but patient is to go to appointment to review her use of imitrex.  Patient verbalized understanding.

## 2018-10-15 ENCOUNTER — OFFICE VISIT (OUTPATIENT)
Dept: FAMILY MEDICINE | Facility: CLINIC | Age: 58
End: 2018-10-15
Payer: COMMERCIAL

## 2018-10-15 ENCOUNTER — RADIANT APPOINTMENT (OUTPATIENT)
Dept: MAMMOGRAPHY | Facility: CLINIC | Age: 58
End: 2018-10-15
Attending: FAMILY MEDICINE
Payer: COMMERCIAL

## 2018-10-15 VITALS
TEMPERATURE: 97.8 F | RESPIRATION RATE: 18 BRPM | HEIGHT: 56 IN | DIASTOLIC BLOOD PRESSURE: 70 MMHG | BODY MASS INDEX: 24.75 KG/M2 | SYSTOLIC BLOOD PRESSURE: 110 MMHG | HEART RATE: 85 BPM | WEIGHT: 110 LBS | OXYGEN SATURATION: 98 %

## 2018-10-15 DIAGNOSIS — N95.1 SYMPTOMATIC MENOPAUSAL OR FEMALE CLIMACTERIC STATES: ICD-10-CM

## 2018-10-15 DIAGNOSIS — G43.009 MIGRAINE WITHOUT AURA AND WITHOUT STATUS MIGRAINOSUS, NOT INTRACTABLE: ICD-10-CM

## 2018-10-15 DIAGNOSIS — M12.9 ARTHROPATHY: Primary | ICD-10-CM

## 2018-10-15 DIAGNOSIS — Z12.31 VISIT FOR SCREENING MAMMOGRAM: ICD-10-CM

## 2018-10-15 DIAGNOSIS — G89.4 CHRONIC PAIN SYNDROME: ICD-10-CM

## 2018-10-15 PROCEDURE — 77067 SCR MAMMO BI INCL CAD: CPT | Mod: TC

## 2018-10-15 PROCEDURE — 99214 OFFICE O/P EST MOD 30 MIN: CPT | Performed by: FAMILY MEDICINE

## 2018-10-15 RX ORDER — HYDROCODONE BITARTRATE AND ACETAMINOPHEN 5; 325 MG/1; MG/1
1 TABLET ORAL 2 TIMES DAILY PRN
Qty: 60 TABLET | Refills: 0 | Status: SHIPPED | OUTPATIENT
Start: 2018-10-26 | End: 2019-04-15

## 2018-10-15 RX ORDER — HYDROCODONE BITARTRATE AND ACETAMINOPHEN 5; 325 MG/1; MG/1
1 TABLET ORAL 2 TIMES DAILY PRN
Qty: 60 TABLET | Refills: 0 | Status: SHIPPED | OUTPATIENT
Start: 2018-11-26 | End: 2019-04-15

## 2018-10-15 RX ORDER — ESTRADIOL 1 MG/1
1 TABLET ORAL DAILY
Qty: 90 TABLET | Refills: 0 | Status: CANCELLED | OUTPATIENT
Start: 2018-10-15

## 2018-10-15 RX ORDER — HYDROCODONE BITARTRATE AND ACETAMINOPHEN 5; 325 MG/1; MG/1
1 TABLET ORAL 2 TIMES DAILY PRN
Qty: 60 TABLET | Refills: 0 | Status: SHIPPED | OUTPATIENT
Start: 2018-12-26 | End: 2019-02-19

## 2018-10-15 RX ORDER — GABAPENTIN 300 MG/1
600 CAPSULE ORAL 3 TIMES DAILY
Qty: 540 CAPSULE | Refills: 1 | Status: SHIPPED | OUTPATIENT
Start: 2018-10-15 | End: 2019-04-15

## 2018-10-15 RX ORDER — PHENTERMINE HYDROCHLORIDE 15 MG/1
15 CAPSULE ORAL EVERY MORNING
Qty: 30 CAPSULE | Refills: 5 | Status: CANCELLED | OUTPATIENT
Start: 2018-10-15

## 2018-10-15 RX ORDER — TOPIRAMATE 25 MG/1
TABLET, FILM COATED ORAL
Qty: 70 TABLET | Refills: 0 | Status: SHIPPED | OUTPATIENT
Start: 2018-10-15 | End: 2018-11-12

## 2018-10-15 RX ORDER — MEDROXYPROGESTERONE ACETATE 2.5 MG/1
2.5 TABLET ORAL DAILY
Qty: 90 TABLET | Refills: 3 | Status: CANCELLED | OUTPATIENT
Start: 2018-10-15

## 2018-10-15 RX ORDER — SUMATRIPTAN 100 MG/1
100 TABLET, FILM COATED ORAL
Qty: 18 TABLET | Refills: 5 | Status: SHIPPED | OUTPATIENT
Start: 2018-10-15 | End: 2019-04-15

## 2018-10-15 ASSESSMENT — ANXIETY QUESTIONNAIRES
6. BECOMING EASILY ANNOYED OR IRRITABLE: NOT AT ALL
2. NOT BEING ABLE TO STOP OR CONTROL WORRYING: NEARLY EVERY DAY
3. WORRYING TOO MUCH ABOUT DIFFERENT THINGS: MORE THAN HALF THE DAYS
GAD7 TOTAL SCORE: 14
7. FEELING AFRAID AS IF SOMETHING AWFUL MIGHT HAPPEN: MORE THAN HALF THE DAYS
5. BEING SO RESTLESS THAT IT IS HARD TO SIT STILL: MORE THAN HALF THE DAYS
1. FEELING NERVOUS, ANXIOUS, OR ON EDGE: NEARLY EVERY DAY

## 2018-10-15 ASSESSMENT — PATIENT HEALTH QUESTIONNAIRE - PHQ9: 5. POOR APPETITE OR OVEREATING: MORE THAN HALF THE DAYS

## 2018-10-15 ASSESSMENT — PAIN SCALES - GENERAL: PAINLEVEL: SEVERE PAIN (6)

## 2018-10-15 NOTE — PROGRESS NOTES
"  SUBJECTIVE:   China Mckenzie is a 58 year old female who presents to clinic today for the following health issues:      Chief Complaint   Patient presents with     Refill Request     3 month pain medication     Body mass index is 24.66 kg/(m^2).    SUBJECTIVE:  China Mckenzie, a 58 year old female scheduled an appointment to discuss the following issues:     Arthropathy  Symptomatic menopausal or female climacteric states  Weight gain  Migraine without aura and without status migrainosus, not intractable  Chronic pain syndrome    Patient cannot tell me all of her medications today. She's unsure if she's taking estrogen alone or estrogen and progesterone.  She hasn't had a mammogram since 11/2016 and wasn't aware that she should be having yearly mammograms.    She was on phentermine a year ago \"but it 'did nothing' for me\".    Migraine Follow-Up    Headaches symptoms:  Worsened     Frequency: varies significantly from several a week to a few a month     Duration of headaches: varies    Able to do normal daily activities/work with migraines: No -      Rescue/Relief medication:sumatriptan (Imitrex)              Effectiveness: moderate relief    Preventative medication: None - has tried amitriptyline in the past and got dry mouth    Neurologic complications: No new stroke-like symptoms, loss of vision or speech, numbness or weakness    In the past 4 weeks, how often have you gone to Urgent Care or the emergency room because of your headaches?  0    Would be willing to try something else prophylactically - we discussed topamax.       Medical, social, surgical, and family histories reviewed.    ROS:  5 point ROS negative except as noted above in HPI, including Gen., Resp., CV, GI &  system review.    OBJECTIVE:  /70  Pulse 85  Temp 97.8  F (36.6  C) (Tympanic)  Resp 18  Ht 4' 8\" (1.422 m)  Wt 110 lb (49.9 kg)  SpO2 98%  Breastfeeding? No  BMI 24.66 kg/m2  EXAM:  GENERAL APPEARANCE: Alert, no " acute distress  RESP: lungs clear to auscultation   CV: normal rate, regular rhythm, no murmur or gallop  ABDOMEN: soft, no organomegaly, masses or tenderness  PSYCH: affect and mood normal, mentation is a bit slow    ASSESSMENT/PLAN:    (M12.9) Arthropathy  Comment:  Usually using <60/month  Plan: HYDROcodone-acetaminophen (NORCO) 5-325 MG per         tablet, HYDROcodone-acetaminophen (NORCO) 5-325        MG per tablet, HYDROcodone-acetaminophen         (NORCO) 5-325 MG per tablet         [place]  was reviewed.     (N95.1) Symptomatic menopausal or female climacteric states  Comment:    Plan:  Patient is going to call back and let us know what she is taking.  She was informed again that she cannot be on unopposed estrogen - if she is on estrogen she needs to also be on progesterone to decrease risk of endometrial cancer    (G43.009) Migraine without aura and without status migrainosus, not intractable  Comment: start topamax- recheck in 1 month  Plan: SUMAtriptan (IMITREX) 100 MG tablet, topiramate        (TOPAMAX) 25 MG tablet             (G89.4) Chronic pain syndrome  Comment:    Plan: HYDROcodone-acetaminophen (NORCO) 5-325 MG per         tablet, gabapentin (NEURONTIN) 300 MG capsule,         HYDROcodone-acetaminophen (NORCO) 5-325 MG per         tablet                 Patient Instructions     Please call the nurse with your medications in front of you.    1.  Are you taking Estradiol and medroxyprogesterone?  You cannot take Estrodiol without progesterone as it can increase the risk of endometrial (uterine cancer).    2.  Start Topiramate as directed - gradually increase the dose as directed.  This is for migraine prevention.  Recheck with me in 1 month on how this is working.    3.  Three prescriptions given for pain medication - this should last at least three months, see me in 3 months for refills.     Thank you for choosing [facility].  You may be receiving a survey in the mail from [employer]  regarding your visit today.  Please take a few minutes to complete and return the survey to let us know how we are doing.      Our Clinic hours are:  Mondays    7:20 am - 7 pm  Tues -  Fri  7:20 am - 5 pm    Clinic Phone: [phone]    The clinic lab opens at 7:30 am Mon - Fri and appointments are required.    [facility]  Ph. [phone]  Monday  8 am - 7pm  Tues - Fri 8 am - 5:30 pm

## 2018-10-15 NOTE — PATIENT INSTRUCTIONS
Please call the nurse with your medications in front of you.    1.  Are you taking Estradiol and medroxyprogesterone?  You cannot take Estrodiol without progesterone as it can increase the risk of endometrial (uterine cancer).    2.  Start Topiramate as directed - gradually increase the dose as directed.  This is for migraine prevention.  Recheck with me in 1 month on how this is working.    3.  Three prescriptions given for pain medication - this should last at least three months, see me in 3 months for refills.     Thank you for choosing [facility].  You may be receiving a survey in the mail from [employer] regarding your visit today.  Please take a few minutes to complete and return the survey to let us know how we are doing.      Our Clinic hours are:  Mondays    7:20 am - 7 pm  Tues -  Fri  7:20 am - 5 pm    Clinic Phone: [phone]    The clinic lab opens at 7:30 am Mon - Fri and appointments are required.    [facility]  Ph. [phone]  Monday  8 am - 7pm  Tues - Fri 8 am - 5:30 pm

## 2018-10-15 NOTE — MR AVS SNAPSHOT
After Visit Summary   10/15/2018    China Mckenzie    MRN: [id number]           Patient Information     Date Of Birth          1960        Visit Information        Provider Department      10/15/2018 1:20 PM Heather Mckenzie MD [facility]        Today's Diagnoses     Arthropathy        Symptomatic menopausal or female climacteric states        Weight gain        Migraine without aura and without status migrainosus, not intractable        Chronic pain syndrome          Care Instructions      Please call the nurse with your medications in front of you.    1.  Are you taking Estradiol and medroxyprogesterone?  You cannot take Estrodiol without progesterone as it can increase the risk of endometrial (uterine cancer).    2.  Start Topiramate as directed - gradually increase the dose as directed.  This is for migraine prevention.  Recheck with me in 1 month on how this is working.    3.  Three prescriptions given for pain medication - this should last at least three months, see me in 3 months for refills.     Thank you for choosing [facility].  You may be receiving a survey in the mail from [employer] regarding your visit today.  Please take a few minutes to complete and return the survey to let us know how we are doing.      Our Clinic hours are:  Mondays    7:20 am - 7 pm  Tues -  Fri  7:20 am - 5 pm    Clinic Phone: [phone]    The clinic lab opens at 7:30 am Mon - Fri and appointments are required.    [facility]  Ph. [phone]  Monday  8 am - 7pm  Tues - Fri 8 am - 5:30 pm                 Follow-ups after your visit        Who to contact     If you have questions or need follow up information about today's clinic visit or your schedule please contact [facility] directly at [phone].  Normal or non-critical lab and imaging results will be communicated to you by MyChart, letter or phone within 4 business days after the  "clinic has received the results. If you do not hear from us within 7 days, please contact the clinic through [employer] or phone. If you have a critical or abnormal lab result, we will notify you by phone as soon as possible.  Submit refill requests through [employer] or call your pharmacy and they will forward the refill request to us. Please allow 3 business days for your refill to be completed.          Additional Information About Your Visit        Care EveryWhere ID     This is your Care EveryWhere ID. This could be used by other organizations to access your [place] medical records  [id number]        Your Vitals Were     Pulse Temperature Respirations Height Pulse Oximetry Breastfeeding?    85 97.8  F (36.6  C) (Tympanic) 18 4' 8\" (1.422 m) 98% No    BMI (Body Mass Index)                   24.66 kg/m2            Blood Pressure from Last 3 Encounters:   10/15/18 110/70   09/06/18 114/72   07/19/18 122/72    Weight from Last 3 Encounters:   10/15/18 110 lb (49.9 kg)   07/19/18 108 lb (49 kg)   05/23/18 110 lb (49.9 kg)              Today, you had the following     No orders found for display         Today's Medication Changes          These changes are accurate as of 10/15/18  1:52 PM.  If you have any questions, ask your nurse or doctor.               Start taking these medicines.        Dose/Directions    topiramate 25 MG tablet   Commonly known as:  TOPAMAX   Used for:  Migraine without aura and without status migrainosus, not intractable   Started by:  Heather Mckenzie MD        1 tablet (25 mg) at bedtime for 1 week, then 1 tablet BID for 1 week, then 1 tablet in AM and 2 in PM for 1 week, then 2 tablets BID   Quantity:  70 tablet   Refills:  0         These medicines have changed or have updated prescriptions.        Dose/Directions    * HYDROcodone-acetaminophen 5-325 MG per tablet   Commonly known as:  NORCO   This may have changed:    - additional instructions  - These instructions start on 10/26/2018. If " you are unsure what to do until then, ask your doctor or other care provider.   Used for:  Arthropathy, Chronic pain syndrome   Changed by:  Heather Mckenzie MD        Dose:  1 tablet   Start taking on:  10/26/2018   Take 1 tablet by mouth 2 times daily as needed for moderate to severe pain Max #60/month   Quantity:  60 tablet   Refills:  0       * HYDROcodone-acetaminophen 5-325 MG per tablet   Commonly known as:  NORCO   This may have changed:  You were already taking a medication with the same name, and this prescription was added. Make sure you understand how and when to take each.   Used for:  Arthropathy   Changed by:  Heather Mckenzie MD        Dose:  1 tablet   Start taking on:  11/26/2018   Take 1 tablet by mouth 2 times daily as needed for moderate to severe pain Max #60/month   Quantity:  60 tablet   Refills:  0       * HYDROcodone-acetaminophen 5-325 MG per tablet   Commonly known as:  NORCO   This may have changed:  You were already taking a medication with the same name, and this prescription was added. Make sure you understand how and when to take each.   Used for:  Arthropathy, Chronic pain syndrome   Changed by:  Heather Mckenzie MD        Dose:  1 tablet   Start taking on:  12/26/2018   Take 1 tablet by mouth 2 times daily as needed for moderate to severe pain   Quantity:  60 tablet   Refills:  0       * Notice:  This list has 3 medication(s) that are the same as other medications prescribed for you. Read the directions carefully, and ask your doctor or other care provider to review them with you.      Stop taking these medicines if you haven't already. Please contact your care team if you have questions.     phentermine 15 MG capsule   Stopped by:  Heather Mckenzie MD                Where to get your medicines      These medications were sent to [facility] - - [place] - [id number] [address]    Hours:  [place]  White Phone:  [phone]     gabapentin 300 MG capsule    SUMAtriptan 100 MG tablet    topiramate 25 MG tablet         Some of these will need a paper prescription and others can be bought over the counter.  Ask your nurse if you have questions.     Bring a paper prescription for each of these medications     HYDROcodone-acetaminophen 5-325 MG per tablet    HYDROcodone-acetaminophen 5-325 MG per tablet    HYDROcodone-acetaminophen 5-325 MG per tablet               Information about OPIOIDS     PRESCRIPTION OPIOIDS: WHAT YOU NEED TO KNOW   We gave you an opioid (narcotic) pain medicine. It is important to manage your pain, but opioids are not always the best choice. You should first try all the other options your care team gave you. Take this medicine for as short a time (and as few doses) as possible.    Some activities can increase your pain, such as bandage changes or therapy sessions. It may help to take your pain medicine 30 to 60 minutes before these activities. Reduce your stress by getting enough sleep, working on hobbies you enjoy and practicing relaxation or meditation. Talk to your care team about ways to manage your pain beyond prescription opioids.    These medicines have risks:    DO NOT drive when on new or higher doses of pain medicine. These medicines can affect your alertness and reaction times, and you could be arrested for driving under the influence (DUI). If you need to use opioids long-term, talk to your care team about driving.    DO NOT operate heavy machinery    DO NOT do any other dangerous activities while taking these medicines.    DO NOT drink any alcohol while taking these medicines.     If the opioid prescribed includes acetaminophen, DO NOT take with any other medicines that contain acetaminophen. Read all labels carefully. Look for the word  acetaminophen  or  Tylenol.  Ask your pharmacist if you have questions or are unsure.    You can get addicted to pain medicines, especially if  you have a history of addiction (chemical, alcohol or substance dependence). Talk to your care team about ways to reduce this risk.    All opioids tend to cause constipation. Drink plenty of water and eat foods that have a lot of fiber, such as fruits, vegetables, prune juice, apple juice and high-fiber cereal. Take a laxative (Miralax, milk of magnesia, Colace, Senna) if you don t move your bowels at least every other day. Other side effects include upset stomach, sleepiness, dizziness, throwing up, tolerance (needing more of the medicine to have the same effect), physical dependence and slowed breathing.    Store your pills in a secure place, locked if possible. We will not replace any lost or stolen medicine. If you don t finish your medicine, please throw away (dispose) as directed by your pharmacist. The [place] [employer]ution [employer] has more information about safe disposal: [email or web address]         Primary Care Provider Office Phone # Fax #    Heather Mckenzie [phone] [address] [facility] [address]        Equal Access to Services     [facility] Center[facility] : Hadii naa barroso hadasho Soarmidaali, waaxda luqadaha, qaybta kaalmada eusebia, quan chandler . So [facility] [phone].    ATENCIÓN: Si habla español, tiene a blanchard disposición servicios gratuitos de asistencia lingüística. Fauziaame al [phone].    We comply with applicable federal civil rights laws and [place] laws. We do not discriminate on the basis of race, color, national origin, age, disability, sex, sexual orientation, or gender identity.            Thank you!     Thank you for choosing [facility]  for your care. Our goal is always to provide you with excellent care. Hearing back from our patients is one way we can continue to improve our services. Please take a few minutes to complete the written survey that you may receive in  the mail after your visit with us. Thank you!             Your Updated Medication List - Protect others around you: Learn how to safely use, store and throw away your medicines at www.disposemymeds.org.          This list is accurate as of 10/15/18  1:52 PM.  Always use your most recent med list.                   Brand Name Dispense Instructions for use Diagnosis    CALCIUM PO      Take 600 mg by mouth        cyclobenzaprine 10 MG tablet    FLEXERIL    30 tablet    Take 1 tablet (10 mg) by mouth 3 times daily as needed for muscle spasms    Back muscle spasm        ULTRA STRENGTH 2000 units Caps   Generic drug:  cholecalciferol      Take 2,000 Units by mouth        estradiol 1 MG tablet    ESTRACE    90 tablet    Take 1 tablet (1 mg) by mouth daily    Symptomatic menopausal or female climacteric states       FLUoxetine 20 MG capsule    PROzac    90 capsule    Take 1 capsule (20 mg) by mouth daily    Generalized anxiety disorder       gabapentin 300 MG capsule    NEURONTIN    540 capsule    Take 2 capsules (600 mg) by mouth 3 times daily    Chronic pain syndrome       * HYDROcodone-acetaminophen 5-325 MG per tablet   Start taking on:  10/26/2018    NORCO    60 tablet    Take 1 tablet by mouth 2 times daily as needed for moderate to severe pain Max #60/month    Arthropathy, Chronic pain syndrome       * HYDROcodone-acetaminophen 5-325 MG per tablet   Start taking on:  11/26/2018    NORCO    60 tablet    Take 1 tablet by mouth 2 times daily as needed for moderate to severe pain Max #60/month    Arthropathy       * HYDROcodone-acetaminophen 5-325 MG per tablet   Start taking on:  12/26/2018    NORCO    60 tablet    Take 1 tablet by mouth 2 times daily as needed for moderate to severe pain    Arthropathy, Chronic pain syndrome       meclizine 25 MG tablet    ANTIVERT    30 tablet    Take 0.5-1 tablets (12.5-25 mg) by mouth every 6 hours as needed for dizziness    BPPV (benign paroxysmal positional vertigo),  unspecified laterality       medroxyPROGESTERone 2.5 MG tablet    PROVERA    90 tablet    Take 1 tablet (2.5 mg) by mouth daily    Symptomatic menopausal or female climacteric states       SUMAtriptan 100 MG tablet    IMITREX    18 tablet    Take 1 tablet (100 mg) by mouth at onset of headache for migraine May repeat in 2 hours if needed: max 2/day    Migraine without aura and without status migrainosus, not intractable       TESTOSTERONE IN VANICREAM 2% CREAM      Apply topically every morning        topiramate 25 MG tablet    TOPAMAX    70 tablet    1 tablet (25 mg) at bedtime for 1 week, then 1 tablet BID for 1 week, then 1 tablet in AM and 2 in PM for 1 week, then 2 tablets BID    Migraine without aura and without status migrainosus, not intractable       * Notice:  This list has 3 medication(s) that are the same as other medications prescribed for you. Read the directions carefully, and ask your doctor or other care provider to review them with you.

## 2018-10-16 ASSESSMENT — PATIENT HEALTH QUESTIONNAIRE - PHQ9: SUM OF ALL RESPONSES TO PHQ QUESTIONS 1-9: 15

## 2018-10-16 ASSESSMENT — ANXIETY QUESTIONNAIRES: GAD7 TOTAL SCORE: 14

## 2018-11-05 ENCOUNTER — TELEPHONE (OUTPATIENT)
Dept: FAMILY MEDICINE | Facility: CLINIC | Age: 58
End: 2018-11-05

## 2018-11-05 NOTE — TELEPHONE ENCOUNTER
Patient was notified and understands. Patient will schedule an appointment.    Etelvina CRAFT RN

## 2018-11-05 NOTE — TELEPHONE ENCOUNTER
"She was not changed from imitrex to topamax.  topamax is a \"prophylactic\" medication which was started in attempt to decrease the frequency and severity of migraines.  She can still use Imitrex and it was refilled on 10/15/2018.  She can see me again if she would like to try a different preventative medication - there are others to try that my  Not have the same side effects.    Heather Mckenzie M.D.    "

## 2018-11-05 NOTE — TELEPHONE ENCOUNTER
Reason for Call:  Migraine Medication    Detailed comments: patient is calling and is stating very frantically that she is getting a Migraine and recently she was changed from Imitrex to Topamax and it made her mouth so dry that she could not talk, so she took herself off of this medication. She is also an  tomorrow, so she needs to remedy this today. She is on her way to work, and can only be called back at 3:30 on her break. Please advise.    Phone Number Patient can be reached at: Cell number on file:    Telephone Information:   Mobile [phone]       Best Time: any    Can we leave a detailed message on this number? YES     Carolann Sousa  Clinic Station  [facility]    Call taken on 11/5/2018 at 2:21 PM by Carolann Sousa

## 2018-11-05 NOTE — TELEPHONE ENCOUNTER
Attempted to reach patient to get more information.  Patient was seen on 10/15/18.     Please see message below.      Thank you    Etelvina CRAFT RN

## 2018-11-12 ENCOUNTER — OFFICE VISIT (OUTPATIENT)
Dept: FAMILY MEDICINE | Facility: CLINIC | Age: 58
End: 2018-11-12
Payer: COMMERCIAL

## 2018-11-12 VITALS
HEIGHT: 56 IN | WEIGHT: 111.2 LBS | DIASTOLIC BLOOD PRESSURE: 76 MMHG | OXYGEN SATURATION: 96 % | HEART RATE: 78 BPM | SYSTOLIC BLOOD PRESSURE: 122 MMHG | TEMPERATURE: 96.6 F | BODY MASS INDEX: 25.01 KG/M2 | RESPIRATION RATE: 16 BRPM

## 2018-11-12 DIAGNOSIS — H91.91 HEARING LOSS OF RIGHT EAR, UNSPECIFIED HEARING LOSS TYPE: Primary | ICD-10-CM

## 2018-11-12 PROCEDURE — 99213 OFFICE O/P EST LOW 20 MIN: CPT | Performed by: FAMILY MEDICINE

## 2018-11-12 NOTE — MR AVS SNAPSHOT
After Visit Summary   11/12/2018    China Mckenzie    MRN: [id number]           Patient Information     Date Of Birth          1960        Visit Information        Provider Department      11/12/2018 3:00 PM Heather Mckenzie MD [facility]        Today's Diagnoses     Hearing loss of right ear, unspecified hearing loss type    -  1      Care Instructions    Are you taking Estradiol and medroxyprogesterone?  You cannot take Estrodiol without progesterone as it can increase the risk of endometrial (uterine cancer).     Sounds like you are not taking either of these and are doing fine without them - you can just stop both of them or get rid of them from your medicine cabinet to avoid confusion.               Follow-ups after your visit        Additional Services     AUDIOLOGY ADULT REFERRAL       Your provider has referred you to: [facility] [phone]   [email or web address]    Specialty Testing:                  Who to contact     If you have questions or need follow up information about today's clinic visit or your schedule please contact [facility] directly at [phone].  Normal or non-critical lab and imaging results will be communicated to you by MyChart, letter or phone within 4 business days after the clinic has received the results. If you do not hear from us within 7 days, please contact the clinic through MyChart or phone. If you have a critical or abnormal lab result, we will notify you by phone as soon as possible.  Submit refill requests through [employer] or call your pharmacy and they will forward the refill request to us. Please allow 3 business days for your refill to be completed.          Additional Information About Your Visit        Care EveryWhere ID     This is your Care EveryWhere ID. This could be used by other organizations to access your [facility]  "records  [id number]        Your Vitals Were     Pulse Temperature Respirations Height Pulse Oximetry BMI (Body Mass Index)    78 96.6  F (35.9  C) (Tympanic) 16 4' 8\" (1.422 m) 96% 24.93 kg/m2       Blood Pressure from Last 3 Encounters:   11/12/18 122/76   10/15/18 110/70   09/06/18 114/72    Weight from Last 3 Encounters:   11/12/18 111 lb 3.2 oz (50.4 kg)   10/15/18 110 lb (49.9 kg)   07/19/18 108 lb (49 kg)              We Performed the Following     AUDIOLOGY ADULT REFERRAL          Today's Medication Changes          These changes are accurate as of 11/12/18  3:27 PM.  If you have any questions, ask your nurse or doctor.               Stop taking these medicines if you haven't already. Please contact your care team if you have questions.     estradiol 1 MG tablet   Commonly known as:  ESTRACE   Stopped by:  Heather Mckenzie MD           medroxyPROGESTERone 2.5 MG tablet   Commonly known as:  PROVERA   Stopped by:  Heather Mckenzie MD           topiramate 25 MG tablet   Commonly known as:  TOPAMAX   Stopped by:  Heather Mckenzie MD                    Primary Care Provider Office Phone # Fax #    Heather Mckenzie [phone] [address] [facility] [address]        Equal Access to Services     [facility] AH: Hadii aad ku hadasho Soomaali, waaxda luqadaha, qaybta kaalmada adeegyada, waxconner lang hayfilomenan nils george la'jose a . So [facility] [phone].    ATENCIÓN: Si habla español, tiene a blanchard disposición servicios gratuitos de asistencia lingüística. Llame al [phone].    We comply with applicable federal civil rights laws and [place] laws. We do not discriminate on the basis of race, color, national origin, age, disability, sex, sexual orientation, or gender identity.            Thank you!     Thank you for choosing [facility]  for your care. Our goal is always to provide you with excellent care. Hearing back from our patients is one way we can continue to " improve our services. Please take a few minutes to complete the written survey that you may receive in the mail after your visit with us. Thank you!             Your Updated Medication List - Protect others around you: Learn how to safely use, store and throw away your medicines at www.disposemymeds.org.          This list is accurate as of 11/12/18  3:27 PM.  Always use your most recent med list.                   Brand Name Dispense Instructions for use Diagnosis    CALCIUM PO      Take 600 mg by mouth        cyclobenzaprine 10 MG tablet    FLEXERIL    30 tablet    Take 1 tablet (10 mg) by mouth 3 times daily as needed for muscle spasms    Back muscle spasm        ULTRA STRENGTH 2000 units Caps   Generic drug:  cholecalciferol      Take 2,000 Units by mouth        FLUoxetine 20 MG capsule    PROzac    90 capsule    Take 1 capsule (20 mg) by mouth daily    Generalized anxiety disorder       gabapentin 300 MG capsule    NEURONTIN    540 capsule    Take 2 capsules (600 mg) by mouth 3 times daily    Chronic pain syndrome       * HYDROcodone-acetaminophen 5-325 MG per tablet    NORCO    60 tablet    Take 1 tablet by mouth 2 times daily as needed for moderate to severe pain Max #60/month    Arthropathy, Chronic pain syndrome       * HYDROcodone-acetaminophen 5-325 MG per tablet   Start taking on:  11/26/2018    NORCO    60 tablet    Take 1 tablet by mouth 2 times daily as needed for moderate to severe pain Max #60/month    Arthropathy       * HYDROcodone-acetaminophen 5-325 MG per tablet   Start taking on:  12/26/2018    NORCO    60 tablet    Take 1 tablet by mouth 2 times daily as needed for moderate to severe pain    Arthropathy, Chronic pain syndrome       meclizine 25 MG tablet    ANTIVERT    30 tablet    Take 0.5-1 tablets (12.5-25 mg) by mouth every 6 hours as needed for dizziness    BPPV (benign paroxysmal positional vertigo), unspecified laterality       SUMAtriptan 100 MG tablet    IMITREX    18 tablet     Take 1 tablet (100 mg) by mouth at onset of headache for migraine May repeat in 2 hours if needed: max 2/day    Migraine without aura and without status migrainosus, not intractable       TESTOSTERONE IN VANICREAM 2% CREAM      Apply topically every morning        * Notice:  This list has 3 medication(s) that are the same as other medications prescribed for you. Read the directions carefully, and ask your doctor or other care provider to review them with you.

## 2018-11-12 NOTE — PATIENT INSTRUCTIONS
Are you taking Estradiol and medroxyprogesterone?  You cannot take Estrodiol without progesterone as it can increase the risk of endometrial (uterine cancer).     Sounds like you are not taking either of these and are doing fine without them - you can just stop both of them or get rid of them from your medicine cabinet to avoid confusion.

## 2018-11-12 NOTE — PROGRESS NOTES
"  SUBJECTIVE:   China Mckenzie is a 58 year old female who presents to clinic today for the following health issues:    Chief Complaint   Patient presents with     Referral     Patient needs a referral to audiologist.  She was recently at [facility] and was told she has hearing loss and she should see someone because there are new options.     Has had hearing loss her \"whole life\".  She went to [facility] and they recommended she see an audiologist.  She has had very poor hearing in her right ear.  She was told there were \"new treatments\" and she would like to pursue this.     She's unsure if she was supposed to get a referral back to [facility], or if they told her to just see a different audiologist- either way, will refer to Audiology in [place] for complete hearing assessment.       At the last visit I also asked her to call us back to let me know if she was taking estrogen/progesterone.  She never called back.  She is convinced she is not taking either of these but IS using the testosterone cream.     She was again reminded that she should NOT be taking unopposed estrogen.  I have asked her to review her medications again at home and discard both medications if she still has them.    /76  Pulse 78  Temp 96.6  F (35.9  C) (Tympanic)  Resp 16  Ht 4' 8\" (1.422 m)  Wt 111 lb 3.2 oz (50.4 kg)  SpO2 96%  BMI 24.93 kg/m2  No exam    ASSESSMENT/PLAN:      ICD-10-CM    1. Hearing loss of right ear, unspecified hearing loss type H91.91 AUDIOLOGY ADULT REFERRAL       Patient Instructions   Are you taking Estradiol and medroxyprogesterone?  You cannot take Estrodiol without progesterone as it can increase the risk of endometrial (uterine cancer).     Sounds like you are not taking either of these and are doing fine without them - you can just stop both of them or get rid of them from your medicine cabinet to avoid confusion.                 "

## 2018-11-12 NOTE — NURSING NOTE
"Initial /76  Pulse 78  Temp 96.6  F (35.9  C) (Tympanic)  Resp 16  Ht 4' 8\" (1.422 m)  Wt 111 lb 3.2 oz (50.4 kg)  SpO2 96%  BMI 24.93 kg/m2 Estimated body mass index is 24.93 kg/(m^2) as calculated from the following:    Height as of this encounter: 4' 8\" (1.422 m).    Weight as of this encounter: 111 lb 3.2 oz (50.4 kg). .      "

## 2018-12-11 DIAGNOSIS — M62.830 BACK MUSCLE SPASM: ICD-10-CM

## 2018-12-11 NOTE — TELEPHONE ENCOUNTER
Routing refill request to provider for review/approval because:  Drug not on the [facility] refill protocol     Thank you    Etelvina CARFT RN

## 2018-12-11 NOTE — TELEPHONE ENCOUNTER
Requested Prescriptions   Pending Prescriptions Disp Refills     cyclobenzaprine (FLEXERIL) 10 MG tablet 30 tablet 5     Sig: Take 1 tablet (10 mg) by mouth 3 times daily as needed for muscle spasms    Last Written Prescription Date:  6/14/2018  Last Fill Quantity: 30,   # refills: 5  Last Office Visit: 11/12/2018 with    Future Office visit:       Routing refill request to provider for review/approval because:  Drug not on the [facility], P or [facility] refill protocol or controlled substance

## 2018-12-12 RX ORDER — CYCLOBENZAPRINE HCL 10 MG
10 TABLET ORAL 3 TIMES DAILY PRN
Qty: 30 TABLET | Refills: 5 | Status: SHIPPED | OUTPATIENT
Start: 2018-12-12 | End: 2019-04-15

## 2019-02-18 DIAGNOSIS — M12.9 ARTHROPATHY: ICD-10-CM

## 2019-02-18 DIAGNOSIS — G89.4 CHRONIC PAIN SYNDROME: ICD-10-CM

## 2019-02-18 NOTE — TELEPHONE ENCOUNTER
Requested Prescriptions   Pending Prescriptions Disp Refills     HYDROcodone-acetaminophen (NORCO) 5-325 MG tablet 60 tablet 0     Sig: Take 1 tablet by mouth 2 times daily as needed for moderate to severe pain    Last Written Prescription Date:  12/26/2018  Last Fill Quantity: 60,   # refills: 0  Last Office Visit: 11/12/2018 with Jonah   Future Office visit:       Routing refill request to provider for review/approval because:  Drug not on the [facility], [facility] or [facility] refill protocol or controlled substance

## 2019-02-19 RX ORDER — HYDROCODONE BITARTRATE AND ACETAMINOPHEN 5; 325 MG/1; MG/1
1 TABLET ORAL 2 TIMES DAILY PRN
Qty: 60 TABLET | Refills: 0 | Status: SHIPPED | OUTPATIENT
Start: 2019-02-19 | End: 2019-04-15

## 2019-03-13 ENCOUNTER — TELEPHONE (OUTPATIENT)
Dept: FAMILY MEDICINE | Facility: CLINIC | Age: 59
End: 2019-03-13

## 2019-03-13 NOTE — TELEPHONE ENCOUNTER
Per patient, she is without insurance.  She is trying to get on [employer] but doesn't know if she will be accepted.  She is wondering what she can do, she needs her norco refilled.     Advised that we may be able to offer a telephone visit but will need to check with provider.  Patient agreeable.  Forwarded to provider for recommendations.      Sarah Scott RN

## 2019-03-13 NOTE — TELEPHONE ENCOUNTER
Per our narcotic contract, a clinic visit is due every 3 months, it has now been four months since she has been in.    Heather Mckenzie M.D.

## 2019-03-13 NOTE — TELEPHONE ENCOUNTER
Left message for patient that she will need an in clinic appointment.  She can call to schedule.    Sarah Scott RN

## 2019-03-13 NOTE — TELEPHONE ENCOUNTER
Reason for Call:  Other call back    Detailed comments: Pts insurance is being processed. She can't come in until it goes through. She will be out of her Norco soon and is due for an appointment with Dr Mckenzie.  She is also having a lot pain in her left hip. What can she do?    Phone Number Patient can be reached at: Home number on file [phone] (home)    Best Time: any    Can we leave a detailed message on this number? YES    Call taken on 3/13/2019 at 2:25 PM by Ansley Wolf

## 2019-03-13 NOTE — TELEPHONE ENCOUNTER
Requested Prescriptions   Pending Prescriptions Disp Refills     HYDROcodone-acetaminophen (NORCO) 5-325 MG tablet [Pharmacy Med Name: HYDROCODONE-ACETAMINOPHEN 5 MG-325MG TABLET] 60 tablet      Sig: TAKE ONE TABLET BY MOUTH TWICE DAILY AS NEEDED FOR PAIN    There is no refill protocol information for this order   Last Written Prescription Date:  2/19/19  Last Fill Quantity: 60 tab,  # refills: 0   Last office visit: 11/12/2018 with prescribing provider:  Heather Mckenzie     Future Office Visit:

## 2019-03-14 RX ORDER — HYDROCODONE BITARTRATE AND ACETAMINOPHEN 5; 325 MG/1; MG/1
TABLET ORAL
Qty: 60 TABLET | OUTPATIENT
Start: 2019-03-14

## 2019-03-15 NOTE — TELEPHONE ENCOUNTER
Patient refuses to have another provider refill the medication.  Patient was advised to call every morning to see if she can get in with PCP earlier.     Etelvina CRAFT RN

## 2019-04-15 ENCOUNTER — OFFICE VISIT (OUTPATIENT)
Dept: FAMILY MEDICINE | Facility: CLINIC | Age: 59
End: 2019-04-15
Payer: COMMERCIAL

## 2019-04-15 VITALS
WEIGHT: 115 LBS | RESPIRATION RATE: 18 BRPM | TEMPERATURE: 96.6 F | DIASTOLIC BLOOD PRESSURE: 64 MMHG | BODY MASS INDEX: 25.87 KG/M2 | OXYGEN SATURATION: 96 % | HEART RATE: 86 BPM | SYSTOLIC BLOOD PRESSURE: 122 MMHG | HEIGHT: 56 IN

## 2019-04-15 DIAGNOSIS — M62.830 BACK MUSCLE SPASM: ICD-10-CM

## 2019-04-15 DIAGNOSIS — F41.1 GENERALIZED ANXIETY DISORDER: ICD-10-CM

## 2019-04-15 DIAGNOSIS — G89.4 CHRONIC PAIN SYNDROME: ICD-10-CM

## 2019-04-15 DIAGNOSIS — M12.9 ARTHROPATHY: Primary | ICD-10-CM

## 2019-04-15 DIAGNOSIS — G43.009 MIGRAINE WITHOUT AURA AND WITHOUT STATUS MIGRAINOSUS, NOT INTRACTABLE: ICD-10-CM

## 2019-04-15 PROCEDURE — 99214 OFFICE O/P EST MOD 30 MIN: CPT | Performed by: FAMILY MEDICINE

## 2019-04-15 RX ORDER — CYCLOBENZAPRINE HCL 10 MG
10 TABLET ORAL 3 TIMES DAILY PRN
Qty: 30 TABLET | Refills: 5 | Status: SHIPPED | OUTPATIENT
Start: 2019-04-15 | End: 2020-01-10

## 2019-04-15 RX ORDER — HYDROCODONE BITARTRATE AND ACETAMINOPHEN 5; 325 MG/1; MG/1
1 TABLET ORAL 2 TIMES DAILY PRN
Qty: 60 TABLET | Refills: 0 | Status: SHIPPED | OUTPATIENT
Start: 2019-06-15 | End: 2019-07-29

## 2019-04-15 RX ORDER — SUMATRIPTAN 100 MG/1
100 TABLET, FILM COATED ORAL
Qty: 18 TABLET | Refills: 5 | Status: SHIPPED | OUTPATIENT
Start: 2019-04-15 | End: 2020-04-17

## 2019-04-15 RX ORDER — HYDROCODONE BITARTRATE AND ACETAMINOPHEN 5; 325 MG/1; MG/1
1 TABLET ORAL 2 TIMES DAILY PRN
Qty: 60 TABLET | Refills: 0 | Status: SHIPPED | OUTPATIENT
Start: 2019-05-15 | End: 2019-06-14

## 2019-04-15 RX ORDER — GABAPENTIN 300 MG/1
600 CAPSULE ORAL 3 TIMES DAILY
Qty: 540 CAPSULE | Refills: 1 | Status: SHIPPED | OUTPATIENT
Start: 2019-04-15 | End: 2019-09-06

## 2019-04-15 RX ORDER — HYDROCODONE BITARTRATE AND ACETAMINOPHEN 5; 325 MG/1; MG/1
1 TABLET ORAL 2 TIMES DAILY PRN
Qty: 60 TABLET | Refills: 0 | Status: SHIPPED | OUTPATIENT
Start: 2019-04-15 | End: 2019-05-15

## 2019-04-15 ASSESSMENT — PATIENT HEALTH QUESTIONNAIRE - PHQ9: SUM OF ALL RESPONSES TO PHQ QUESTIONS 1-9: 5

## 2019-04-15 ASSESSMENT — MIFFLIN-ST. JEOR: SCORE: 969.64

## 2019-04-15 ASSESSMENT — PAIN SCALES - GENERAL: PAINLEVEL: NO PAIN (0)

## 2019-04-15 NOTE — PROGRESS NOTES
"  SUBJECTIVE:   China Mckenzie is a 58 year old female who presents to clinic today for the following health issues:    Chief Complaint   Patient presents with     Medication Refill     Patient feels like the Norco isn't enough. Patient feels like when getting out of the car her left hip causes pain rated at a 8/10.          HPI  Medication Followup of vicodin    Taking Medication as prescribed: yes    Side Effects:  None    Medication Helping Symptoms:  Some, patient feels like medication is not enough.     She refuses a referral to the pain clinic    [place] of [place]  shows last prescription for [place]co filled mid Feb.      Last prescription for Gabapentin also filled mid Feb- not taking as prescribed apparently.      Hip pain:  Patient declined xray or referral to pt/orthopedics.  \"I just don't want to go to that extreme\".  She is working out more. She only has pain in her left hip when going from sitting/standing when getting out of her car.  No pain currently.     Needs me to print her audiology referral again from her last visit - misplaced it.        /64   Pulse 86   Temp 96.6  F (35.9  C) (Tympanic)   Resp 18   Ht 1.422 m (4' 8\")   Wt 52.2 kg (115 lb)   SpO2 96%   BMI 25.78 kg/m     EXAM: GENERAL APPEARANCE: Alert, no acute distress  PSYCH: mentation appears normal., affect and mood normal     queried.  No outside Rx's. See above for details      ASSESSMENT/PLAN:      ICD-10-CM    1. Arthropathy M12.9 HYDROcodone-acetaminophen (NORCO) 5-325 MG tablet     HYDROcodone-acetaminophen (NORCO) 5-325 MG tablet     HYDROcodone-acetaminophen (NORCO) 5-325 MG tablet   2. Chronic pain syndrome G89.4 HYDROcodone-acetaminophen (NORCO) 5-325 MG tablet     HYDROcodone-acetaminophen (NORCO) 5-325 MG tablet     gabapentin (NEURONTIN) 300 MG capsule   3. Back muscle spasm M62.830 cyclobenzaprine (FLEXERIL) 10 MG tablet   4. Migraine without aura and without status migrainosus, not intractable G43.009 " SUMAtriptan (IMITREX) 100 MG tablet   5. Generalized anxiety disorder F41.1 FLUoxetine (PROZAC) 20 MG capsule     Recheck 3 months with me.      Heather Mckenzie M.D.      Patient Instructions   Gabapentin should be used 600 mg three times daily - this is two of the 300 mg tablets twice a day.      Our Clinic hours are:  Mondays    7:20 am - 7 pm  Tues -  Fri  7:20 am - 5 pm    Clinic Phone: [phone]    The clinic lab opens at 7:30 am Mon - Fri and appointments are required.    [facility]. [phone]  Monday  8 am - 7pm  Tues - Fri 8 am - 5:30 pm

## 2019-04-15 NOTE — PATIENT INSTRUCTIONS
Gabapentin should be used 600 mg three times daily - this is two of the 300 mg tablets twice a day.      Our Clinic hours are:  Mondays    7:20 am - 7 pm  Tues -  Fri  7:20 am - 5 pm    Clinic Phone: [phone]    The clinic lab opens at 7:30 am Mon - Fri and appointments are required.    [facility]  Ph. [phone]  Monday  8 am - 7pm  Tues - Fri 8 am - 5:30 pm

## 2019-05-01 ENCOUNTER — OFFICE VISIT (OUTPATIENT)
Dept: FAMILY MEDICINE | Facility: CLINIC | Age: 59
End: 2019-05-01

## 2019-05-01 DIAGNOSIS — G89.4 CHRONIC PAIN SYNDROME: Primary | ICD-10-CM

## 2019-05-01 PROCEDURE — 99207 ZZC NO CHARGE NURSE ONLY: CPT

## 2019-05-01 NOTE — PROGRESS NOTES
Pt was given Norco scripts x 3 in April.  Has 2 paper scripts but missing the 3rd.  Call [facility] and script/med was ready for her there.  KpaSaba

## 2019-05-07 DIAGNOSIS — F41.1 GENERALIZED ANXIETY DISORDER: ICD-10-CM

## 2019-05-08 NOTE — TELEPHONE ENCOUNTER
"Requested Prescriptions   Pending Prescriptions Disp Refills     FLUoxetine (PROZAC) 20 MG capsule [Pharmacy Med Name: FLUOXETINE HCL 20 MG CAPSULE] 30 capsule      Sig: TAKE 1 CAPSULE BY MOUTH DAILY       SSRIs Protocol Passed - 5/7/2019  6:36 PM        Passed - Recent (12 mo) or future (30 days) visit within the authorizing provider's specialty     Patient had office visit in the last 12 months or has a visit in the next 30 days with authorizing provider or within the authorizing provider's specialty.  See \"Patient Info\" tab in inbasket, or \"Choose Columns\" in Meds & Orders section of the refill encounter.              Passed - Medication is active on med list        Passed - Patient is age 18 or older        Passed - No active pregnancy on record        Passed - No positive pregnancy test in last 12 months        Last Written Prescription Date:  4/15/19  Last Fill Quantity: 90,  # refills: 3   Last office visit: 5/1/2019 with prescribing provider:  Jonah   Future Office Visit:      "

## 2019-05-09 NOTE — TELEPHONE ENCOUNTER
4/15/19, #90 [facility] 3 [facility] - [place], [place] - [address] AT [facility] OF [address] [place] [address]    Stefany LU RN

## 2019-07-29 ENCOUNTER — TELEPHONE (OUTPATIENT)
Dept: FAMILY MEDICINE | Facility: CLINIC | Age: 59
End: 2019-07-29

## 2019-07-29 DIAGNOSIS — G89.4 CHRONIC PAIN SYNDROME: ICD-10-CM

## 2019-07-29 DIAGNOSIS — M12.9 ARTHROPATHY: ICD-10-CM

## 2019-07-29 RX ORDER — HYDROCODONE BITARTRATE AND ACETAMINOPHEN 5; 325 MG/1; MG/1
1 TABLET ORAL 2 TIMES DAILY PRN
Qty: 60 TABLET | Refills: 0 | Status: SHIPPED | OUTPATIENT
Start: 2019-07-29 | End: 2019-09-23

## 2019-07-29 NOTE — TELEPHONE ENCOUNTER
Last fill without an appointment. Is supposed to be seen every 3 months per our narcotic contract.  This fill should last through her upcoming appointment.     Is she taking her gabapentin?  Doesn't look like it's being refilled regularly.    Heather Mckenzie M.D.

## 2019-07-29 NOTE — TELEPHONE ENCOUNTER
Routing refill request to provider for review/approval because:  Drug not on the [facility] refill protocol     OV 4/15/19 and last RX written on 6/15/19    Thank you    Etelvina CRAFT RN

## 2019-07-29 NOTE — TELEPHONE ENCOUNTER
Reason for Call:  Medication or medication refill:    Do you use a [facility]?  Name of the pharmacy and phone number for the current request:  [facility] [place] [phone]    Name of the medication requested: Norco - Call patient when Rx hard copy is ready for  at the clinic .  Pt needs 1 mo hard copy to get her through until her upcoming appt.    Norco  Last Written Prescription Date:  6/15/19  Last Fill Quantity: 60,  # refills: 0   Last office visit: 5/1/2019 with prescribing provider:     Future Office Visit:   Next 5 appointments (look out 90 days)    Sep 06, 2019  1:40 PM CDT  SHORT [facility] [address] [facility] [address]  [phone]         Other request:     Can we leave a detailed message on this number? YES    Phone number patient can be reached at: Home number on file [phone] (home)    Best Time: any    Call taken on 7/29/2019 at 9:08 AM by Gloria Escobar

## 2019-07-29 NOTE — TELEPHONE ENCOUNTER
Patient was notified and would like RX walked to . Patient reports she is still taking the gabapentin but she forgets the afternoon dose.    Thank you    Etelvina CRAFT RN

## 2019-08-21 DIAGNOSIS — N94.810 VULVAR VESTIBULITIS: Primary | ICD-10-CM

## 2019-08-26 DIAGNOSIS — N94.810 VULVAR VESTIBULITIS: ICD-10-CM

## 2019-08-26 PROCEDURE — 36415 COLL VENOUS BLD VENIPUNCTURE: CPT | Performed by: FAMILY MEDICINE

## 2019-08-26 PROCEDURE — 84270 ASSAY OF SEX HORMONE GLOBUL: CPT | Performed by: FAMILY MEDICINE

## 2019-08-26 PROCEDURE — 84403 ASSAY OF TOTAL TESTOSTERONE: CPT | Performed by: FAMILY MEDICINE

## 2019-08-28 LAB
SHBG SERPL-SCNC: 44 NMOL/L (ref 30–135)
TESTOST FREE SERPL-MCNC: 0.5 NG/DL (ref 0.06–0.38)
TESTOST SERPL-MCNC: 34 NG/DL (ref 8–60)

## 2019-09-06 ENCOUNTER — OFFICE VISIT (OUTPATIENT)
Dept: FAMILY MEDICINE | Facility: CLINIC | Age: 59
End: 2019-09-06
Payer: COMMERCIAL

## 2019-09-06 VITALS
HEIGHT: 56 IN | HEART RATE: 77 BPM | TEMPERATURE: 98.1 F | WEIGHT: 114.8 LBS | BODY MASS INDEX: 25.82 KG/M2 | DIASTOLIC BLOOD PRESSURE: 64 MMHG | SYSTOLIC BLOOD PRESSURE: 118 MMHG | OXYGEN SATURATION: 98 %

## 2019-09-06 DIAGNOSIS — F41.1 GENERALIZED ANXIETY DISORDER: ICD-10-CM

## 2019-09-06 DIAGNOSIS — F11.90 CHRONIC, CONTINUOUS USE OF OPIOIDS: Primary | ICD-10-CM

## 2019-09-06 DIAGNOSIS — G89.4 CHRONIC PAIN SYNDROME: ICD-10-CM

## 2019-09-06 PROCEDURE — 99214 OFFICE O/P EST MOD 30 MIN: CPT | Performed by: FAMILY MEDICINE

## 2019-09-06 RX ORDER — GABAPENTIN 300 MG/1
600 CAPSULE ORAL 3 TIMES DAILY
Qty: 540 CAPSULE | Refills: 1 | Status: SHIPPED | OUTPATIENT
Start: 2019-09-06 | End: 2020-01-10

## 2019-09-06 ASSESSMENT — MIFFLIN-ST. JEOR: SCORE: 967.24

## 2019-09-06 NOTE — PATIENT INSTRUCTIONS
Call when you have a week left of your Hydrocodone/acetaminophen.    You still need to be seen every 3 months or so.      We can now electronically send narcotic prescriptions, but they need to be filled within 30 days of when they were written (new [place] state law).    Heatehr Mckenzie M.D.

## 2019-09-06 NOTE — PROGRESS NOTES
"Subjective     China Mckenzie is a 58 year old female who presents to clinic today for the following health issues:    HPI   Anxiety Follow-Up    How are you doing with your anxiety since your last visit? Improved better    Are you having other symptoms that might be associated with anxiety? No    Have you had a significant life event? Financial Concerns     Are you feeling depressed? Yes:  pet is getting older    Do you have any concerns with your use of alcohol or other drugs? No    Has stopped taking her fluoxetine but cannot tell me when she last took it.  Feeling like she has less stress in her life currently other than her  losing his job.     Social History     Tobacco Use     Smoking status: Never Smoker     Smokeless tobacco: Never Used   Substance Use Topics     Alcohol use: Yes     Comment: occ     Drug use: No     RON-7 SCORE 5/3/2017 5/23/2018 10/15/2018   Total Score - - -   Total Score 5 6 14     PHQ 5/23/2018 10/15/2018 4/15/2019   PHQ-9 Total Score 13 15 5   Q9: Thoughts of better off dead/self-harm past 2 weeks Not at all Not at all Not at all         Chronic Pain Follow-Up       Type / Location of Pain: legs and back  Analgesia/pain control:       Recent changes:  same      Overall control: Comfortably manageable  Activity level/function:      Daily activities:  Able to do all daily activities    Work:  Pain does not limit any  workNoAdverse effects:  No  Adherance    Taking medication as directed?  No: due to cost of medication  Not taking the gabapentin regularly due to cost.  Tries to \"stretch out\" the prescription and takes twice a day, rarely three times a day.  Also finds that she isn't taking the norco as often - claims she still has a 1/2 bottle of it left after she last refilled it 7/29/2019 (therefore it won't be prescribed today).     Participating in other treatments: no -    Risk Factors:    Sleep:  Good    Mood/anxiety:  controlled    Recent family or social stressors:  " "job change/loss    Other aggravating factors: none  PHQ-9 SCORE 5/23/2018 10/15/2018 4/15/2019   PHQ-9 Total Score - - -   PHQ-9 Total Score 13 15 5     RON-7 SCORE 5/3/2017 5/23/2018 10/15/2018   Total Score - - -   Total Score 5 6 14     Encounter-Level CSA:    There are no encounter-level csa.     Patient-Level CSA:    There are no patient-level csa.           How many servings of fruits and vegetables do you eat daily?  2-3    On average, how many sweetened beverages do you drink each day (soda, juice, sweet tea, etc)?   0    How many days per week do you miss taking your medication? 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  We talked about the dangers of this and her ability to pass along the flu before she knows she is sick.     Reviewed and updated as needed this visit by Provider         Review of Systems   ROS COMP: Constitutional, HEENT, cardiovascular, pulmonary, gi and gu systems are negative, except as otherwise noted.      Objective    /64 (BP Location: Right arm, Patient Position: Sitting, Cuff Size: Adult Regular)   Pulse 77   Temp 98.1  F (36.7  C) (Tympanic)   Ht 1.42 m (4' 7.91\")   Wt 52.1 kg (114 lb 12.8 oz)   SpO2 98%   BMI 25.82 kg/m    Body mass index is 25.82 kg/m .  Physical Exam   GENERAL: healthy, alert and no distress  PSYCH: mentation appears normal, affect normal/bright            Assessment & Plan       ICD-10-CM    1. Chronic, continuous use of opioids F11.90    2. Chronic pain syndrome G89.4 gabapentin (NEURONTIN) 300 MG capsule   3. Generalized anxiety disorder F41.1         Patient Instructions   Call when you have a week left of your Hydrocodone/acetaminophen.    You still need to be seen every 3 months or so.      We can now electronically send narcotic prescriptions, but they need to be filled within 30 days of when they were written (new [place] state law).    Heather Mckenzie M.D.        BMI:   Estimated body mass index is 25.82 kg/m  as " "calculated from the following:    Height as of this encounter: 1.42 m (4' 7.91\").    Weight as of this encounter: 52.1 kg (114 lb 12.8 oz).               Return in about 3 months (around 12/6/2019) for pain med recheck.    Heather Mckenzie MD  [facility]    "

## 2019-09-23 ENCOUNTER — TELEPHONE (OUTPATIENT)
Dept: FAMILY MEDICINE | Facility: CLINIC | Age: 59
End: 2019-09-23

## 2019-09-23 DIAGNOSIS — M12.9 ARTHROPATHY: ICD-10-CM

## 2019-09-23 DIAGNOSIS — G89.4 CHRONIC PAIN SYNDROME: ICD-10-CM

## 2019-09-23 RX ORDER — HYDROCODONE BITARTRATE AND ACETAMINOPHEN 5; 325 MG/1; MG/1
1 TABLET ORAL 2 TIMES DAILY PRN
Qty: 60 TABLET | Refills: 0 | Status: SHIPPED | OUTPATIENT
Start: 2019-09-23 | End: 2019-11-23

## 2019-09-23 NOTE — TELEPHONE ENCOUNTER
Reason for Call:  Medication or medication refill:    Do you use a [facility]?  Name of the pharmacy and phone number for the current request:  [facility] [phone]    Name of the medication requested: [place] - Pt called for refill. Please call patient and advise.      Norco  Last Written Prescription Date:  7/29/19  Last Fill Quantity: 60,  # refills: 0   Last office visit: 9/6/2019 with prescribing provider:     Future Office Visit:      Other request:     Can we leave a detailed message on this number? YES    Phone number patient can be reached at: Home number on file [phone] (home)    Best Time: any    Call taken on 9/23/2019 at 2:37 PM by Gloria Escobar

## 2019-09-23 NOTE — TELEPHONE ENCOUNTER
Routing refill request to provider for review/approval because:  Drug not on the [facility] refill protocol   OV notes on 9/6/19:    Patient Instructions   Call when you have a week left of your Hydrocodone/acetaminophen.     You still need to be seen every 3 months or so.       We can now electronically send narcotic prescriptions, but they need to be filled within 30 days of when they were written (new [place] state law).     Heather Mckenzie M.D.       Thank you    Etelvina CRAFT RN

## 2019-11-23 ENCOUNTER — TELEPHONE (OUTPATIENT)
Dept: FAMILY MEDICINE | Facility: CLINIC | Age: 59
End: 2019-11-23

## 2019-11-23 DIAGNOSIS — M12.9 ARTHROPATHY: ICD-10-CM

## 2019-11-23 DIAGNOSIS — G89.4 CHRONIC PAIN SYNDROME: ICD-10-CM

## 2019-11-25 RX ORDER — HYDROCODONE BITARTRATE AND ACETAMINOPHEN 5; 325 MG/1; MG/1
1 TABLET ORAL 2 TIMES DAILY PRN
Qty: 60 TABLET | Refills: 0 | Status: SHIPPED | OUTPATIENT
Start: 2019-11-25 | End: 2020-01-10

## 2019-11-25 NOTE — TELEPHONE ENCOUNTER
Requested Prescriptions   Pending Prescriptions Disp Refills     HYDROcodone-acetaminophen (NORCO) 5-325 MG tablet [Pharmacy Med Name: HYDROCODONE-ACETAMINOPHEN 5 MG-325MG TABLET] 60 tablet      Sig: Take 1 tablet by mouth 2 times daily as needed for moderate to severe pain       There is no refill protocol information for this order              Last Written Prescription Date:  9/23/2019  Last Fill Quantity: 60,   # refills: 0  Last Office Visit: 9/6/2019 with Jonah   Future Office visit:       Routing refill request to provider for review/approval because:  Drug not on the [facility], P or [facility] refill protocol or controlled substance

## 2020-01-10 ENCOUNTER — OFFICE VISIT (OUTPATIENT)
Dept: FAMILY MEDICINE | Facility: CLINIC | Age: 60
End: 2020-01-10
Payer: COMMERCIAL

## 2020-01-10 VITALS
DIASTOLIC BLOOD PRESSURE: 62 MMHG | HEART RATE: 72 BPM | BODY MASS INDEX: 24.97 KG/M2 | SYSTOLIC BLOOD PRESSURE: 112 MMHG | OXYGEN SATURATION: 97 % | TEMPERATURE: 98.3 F | RESPIRATION RATE: 18 BRPM | WEIGHT: 111 LBS | HEIGHT: 56 IN

## 2020-01-10 DIAGNOSIS — M76.892 HIP FLEXOR TENDINITIS, LEFT: ICD-10-CM

## 2020-01-10 DIAGNOSIS — F41.1 GENERALIZED ANXIETY DISORDER: ICD-10-CM

## 2020-01-10 DIAGNOSIS — M12.9 ARTHROPATHY: Primary | ICD-10-CM

## 2020-01-10 DIAGNOSIS — Z13.6 CARDIOVASCULAR SCREENING; LDL GOAL LESS THAN 160: ICD-10-CM

## 2020-01-10 DIAGNOSIS — M62.830 BACK MUSCLE SPASM: ICD-10-CM

## 2020-01-10 DIAGNOSIS — G89.4 CHRONIC PAIN SYNDROME: ICD-10-CM

## 2020-01-10 PROCEDURE — 99214 OFFICE O/P EST MOD 30 MIN: CPT | Performed by: FAMILY MEDICINE

## 2020-01-10 RX ORDER — HYDROCODONE BITARTRATE AND ACETAMINOPHEN 5; 325 MG/1; MG/1
1 TABLET ORAL 2 TIMES DAILY PRN
Qty: 60 TABLET | Refills: 0 | Status: SHIPPED | OUTPATIENT
Start: 2020-01-10 | End: 2020-02-13

## 2020-01-10 RX ORDER — CYCLOBENZAPRINE HCL 10 MG
10 TABLET ORAL 3 TIMES DAILY PRN
Qty: 30 TABLET | Refills: 5 | Status: SHIPPED | OUTPATIENT
Start: 2020-01-10 | End: 2020-04-20

## 2020-01-10 ASSESSMENT — PATIENT HEALTH QUESTIONNAIRE - PHQ9: SUM OF ALL RESPONSES TO PHQ QUESTIONS 1-9: 2

## 2020-01-10 ASSESSMENT — PAIN SCALES - GENERAL: PAINLEVEL: MILD PAIN (3)

## 2020-01-10 ASSESSMENT — MIFFLIN-ST. JEOR: SCORE: 946.49

## 2020-01-10 NOTE — PATIENT INSTRUCTIONS
Health Maintenance reviewed and plan for update discussed.  Patient asked to schedule her mammogram    Acetaminophen 1000 mg twice daily or three times daily   Maximum dose of 4000 mg a day of acetaminophen    See pain clinic for pain psychology/alternative medications that may be helpful to you for the pain.    Heather Mckenzie M.D.      Our Clinic hours are:  Mondays    7:20 am - 7 pm  Tues -  Fri  7:20 am - 5 pm    Clinic Phone: [phone]    The clinic lab opens at 7:30 am Mon - Fri and appointments are required.    [facility]  Ph. [phone]  Monday  8 am - 7pm  Tues - Fri 8 am - 5:30 pm

## 2020-01-10 NOTE — PROGRESS NOTES
"Subjective     China Mckenzie is a 59 year old female who presents to clinic today for the following health issues:    HPI     Chief Complaint   Patient presents with     Medication Reconciliation     no longer taking zoloft     Medication Reconciliation     not taking gabapentin     Refill Request     Norco and Flexeril     Flu Shot     declined     Health Maintenance     mammogram       Medication Followup of vicodin    Taking Medication as prescribed: yes    Side Effects:  None    Medication Helping Symptoms:  Yes    Patient is NOT taking the norco twice daily - she was last given #60 on 11/25/2019 and has 10 pills left or so at this time.  She uses it when her joint pain gets so severe that she can't think about anything else.  When she first started seeing me a few years ago, she was on 120 norco/month and filled them and took them regularly, we have gradually weaned her back to prn use and are trying to minimize this.  We again discussed the abuse/addiction potential, the CNS side effects (drowsy, altered mental status) and recommendations to not drive while taking medications.    She has been prescribed gabapentin but found it to be expensive and she didn't feel it was effective.  I'm not convinced she ever took it regularly based on her refill dates, etc.  She has since stopped taking that.     She has also stopped taking zoloft \"now that my brother is out of my life\".      She is not opposed to seeing the pain clinic, we talked about the possibility of other non-narcotic modalities being used to help her overall pain.     She attributes her \"whole body pain\" to years of work in construction \"trying to prove that I wasn't too little to work\".  She has generalized arthritis/arthropathy.        Patient also complains of left hip pain - several months.  No injuries.    The pain feels better with massage of the proximal hip/hip flexors.      Reviewed and updated as needed this visit by Provider     " "    Review of Systems   ROS COMP: Constitutional, HEENT, cardiovascular, pulmonary, gi and gu systems are negative, except as otherwise noted.      Objective    /62   Pulse 72   Temp 98.3  F (36.8  C) (Oral)   Resp 18   Ht 1.422 m (4' 8\")   Wt 50.3 kg (111 lb)   SpO2 97%   Breastfeeding No   BMI 24.89 kg/m    Body mass index is 24.89 kg/m .  Physical Exam   GENERAL: healthy, alert and no distress  NECK: no adenopathy, no asymmetry, masses, or scars and thyroid normal to palpation  RESP: lungs clear to auscultation - no rales, rhonchi or wheezes  CV: regular rate and rhythm, normal S1 S2, no S3 or S4, no murmur, click or rub, no peripheral edema and peripheral pulses strong  ABDOMEN: soft, nontender, no hepatosplenomegaly, no masses and bowel sounds normal  MS: LLE exam shows normal strength and muscle mass, no deformities, ROM of all joints is normal, no evidence of joint instability and tender to palpation at anterior joint line/proximal hip flexors  PSYCH: mentation appears normal, affect normal/bright    Diagnostic Test Results:  Labs reviewed in Epic        Assessment & Plan       ICD-10-CM    1. Arthropathy M12.9 HYDROcodone-acetaminophen (NORCO) 5-325 MG tablet   2. Chronic pain syndrome G89.4 HYDROcodone-acetaminophen (NORCO) 5-325 MG tablet     PAIN MANAGEMENT REFERRAL     Comprehensive metabolic panel   3. Generalized anxiety disorder F41.1    4. Back muscle spasm M62.830 cyclobenzaprine (FLEXERIL) 10 MG tablet   5. CARDIOVASCULAR SCREENING; LDL GOAL LESS THAN 160 Z13.6 Lipid panel reflex to direct LDL Non-fasting   6. Hip flexor tendinitis, left M76.892 PHYSICAL THERAPY REFERRAL     Patient is not overusing her norco, however, as always, we'd like to have her use the smallest dose possible.  She does not take regular acetaminophen and this was recommended.  Encouraged her to use the lowest dose possible of the norco.  #60 prescribed, this should last >1-2 months.      PT for the hip pain.  At " this time seems to be more muscular than joint related. If not improving consider [facility] or start with imaging.     Patient Instructions   Health Maintenance reviewed and plan for update discussed.  Patient asked to schedule her mammogram    Acetaminophen 1000 mg twice daily or three times daily   Maximum dose of 4000 mg a day of acetaminophen    See pain clinic for pain psychology/alternative medications that may be helpful to you for the pain.    Heather Mckenzie M.D.      Our Clinic hours are:  Mondays    7:20 am - 7 pm  Tues -  Fri  7:20 am - 5 pm    Clinic Phone: [phone]    The clinic lab opens at 7:30 am Mon - Fri and appointments are required.    [facility]  Ph. [phone]  Monday  8 am - 7pm  Tues - Fri 8 am - 5:30 pm                  No follow-ups on file.    Heather Mckenzie MD  [facility]

## 2020-01-13 ENCOUNTER — TELEPHONE (OUTPATIENT)
Dept: PALLIATIVE MEDICINE | Facility: CLINIC | Age: 60
End: 2020-01-13

## 2020-01-13 NOTE — TELEPHONE ENCOUNTER
Pt will call back to schedule New Eval; Generalized arthritis/arthropathy.    Kesha WALTERS    [facility]

## 2020-01-14 ENCOUNTER — HOSPITAL ENCOUNTER (OUTPATIENT)
Dept: PHYSICAL THERAPY | Facility: CLINIC | Age: 60
Setting detail: THERAPIES SERIES
End: 2020-01-14
Attending: FAMILY MEDICINE
Payer: COMMERCIAL

## 2020-01-14 DIAGNOSIS — G89.4 CHRONIC PAIN SYNDROME: ICD-10-CM

## 2020-01-14 DIAGNOSIS — M76.892 HIP FLEXOR TENDINITIS, LEFT: ICD-10-CM

## 2020-01-14 DIAGNOSIS — Z13.6 CARDIOVASCULAR SCREENING; LDL GOAL LESS THAN 160: ICD-10-CM

## 2020-01-14 LAB
ALBUMIN SERPL-MCNC: 3.9 G/DL (ref 3.4–5)
ALP SERPL-CCNC: 76 U/L (ref 40–150)
ALT SERPL W P-5'-P-CCNC: 16 U/L (ref 0–50)
ANION GAP SERPL CALCULATED.3IONS-SCNC: 2 MMOL/L (ref 3–14)
AST SERPL W P-5'-P-CCNC: 17 U/L (ref 0–45)
BILIRUB SERPL-MCNC: 1 MG/DL (ref 0.2–1.3)
BUN SERPL-MCNC: 27 MG/DL (ref 7–30)
CALCIUM SERPL-MCNC: 9.4 MG/DL (ref 8.5–10.1)
CHLORIDE SERPL-SCNC: 103 MMOL/L (ref 94–109)
CHOLEST SERPL-MCNC: 226 MG/DL
CO2 SERPL-SCNC: 33 MMOL/L (ref 20–32)
CREAT SERPL-MCNC: 1.08 MG/DL (ref 0.52–1.04)
GFR SERPL CREATININE-BSD FRML MDRD: 57 ML/MIN/{1.73_M2}
GLUCOSE SERPL-MCNC: 108 MG/DL (ref 70–99)
HDLC SERPL-MCNC: 82 MG/DL
LDLC SERPL CALC-MCNC: 126 MG/DL
NONHDLC SERPL-MCNC: 144 MG/DL
POTASSIUM SERPL-SCNC: 4.6 MMOL/L (ref 3.4–5.3)
PROT SERPL-MCNC: 7.9 G/DL (ref 6.8–8.8)
SODIUM SERPL-SCNC: 138 MMOL/L (ref 133–144)
TRIGL SERPL-MCNC: 88 MG/DL

## 2020-01-14 PROCEDURE — 97110 THERAPEUTIC EXERCISES: CPT | Mod: GP | Performed by: PHYSICAL THERAPIST

## 2020-01-14 PROCEDURE — 97161 PT EVAL LOW COMPLEX 20 MIN: CPT | Mod: GP | Performed by: PHYSICAL THERAPIST

## 2020-01-14 PROCEDURE — 80061 LIPID PANEL: CPT | Performed by: FAMILY MEDICINE

## 2020-01-14 PROCEDURE — 80053 COMPREHEN METABOLIC PANEL: CPT | Performed by: FAMILY MEDICINE

## 2020-01-14 PROCEDURE — 36415 COLL VENOUS BLD VENIPUNCTURE: CPT | Performed by: FAMILY MEDICINE

## 2020-01-14 NOTE — PROGRESS NOTES
China Jonah  1960 01/14/20 0900   General Information   Type of Visit Initial OP Ortho PT Evaluation   Start of Care Date 01/14/20   Referring Physician Heather Mckenzie MD    Patient/Family Goals Statement get rid of pain   Orders Evaluate and Treat   Date of Order 01/10/20   Medical Diagnosis L hip flxr strain   Surgical/Medical history reviewed Yes   Precautions/Limitations no known precautions/limitations   Body Part(s)   Body Part(s) Hip   Presentation and Etiology   Pertinent history of current problem (include personal factors and/or comorbidities that impact the POC) 60 yo female with L HF pain for past 4 months; worse (stiff and painful) after sitting more than 30 min; figure 4 sit; when lying supine at HS; PMH:   chronic pain with perscription narcotic since May 2001; managed by PCP; has referral to pain clinic; PMH: chronic pain (all over)   Impairments A. Pain;H. Impaired gait   How/Where did it occur From insidious onset   Onset date of current episode/exacerbation 01/08/20  (date of PT order)   Chronicity New   Pain rating (0-10 point scale) Best (/10);Worst (/10)   Best (/10) 0   Worst (/10) 10  (brief)   Pain quality A. Sharp;C. Aching;E. Shooting   Frequency of pain/symptoms D. Other  (after sitting; lying in bed at HS)   Pain/symptoms exacerbated by A. Sitting;E. Rest   Prior Level of Function   Functional Level Prior Comment Independent with ADLs but painful   Current Level of Function   Patient role/employment history A. 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 No   Is patient a fall risk? No   Abuse Screen (yes response referral indicated)   Feels Unsafe at Home or Work/School no   Feels Threatened by Someone no   Does Anyone Try to Keep You From Having Contact with Others or Doing Things Outside Your Home? no   Physical Signs of  Abuse Present no   Hip Objective Findings   Side (if bilateral, select both right and left) Left   Observation slight discomfort when arising from sit   Posture WFL; slight post pelvic tilt   Gait/Locomotion painful when getting up from sitting    Balance/Proprioception (Single Leg Stance) 15-20 sec  EO   Hip ROM Comments R hip rotation very different from L   Scour Test -   ASHLEY Test -   FADIR Test +L   Hip Special Tests Comments R hip 20* IT;55*ER   Left Hip Flexion PROM WNL   Left Hip Abduction PROM WNL   Left Hip ER PROM 40   Left Hip IR PROM 30   Left Hip Ext PROM 0   Left Hip Flexion Strength 4+   Left Hip Abduction Strength 4   Left Hip Extension Strength 4-   Left Knee Extension Strength 5-   Adonay Flexibility Test tight bilaterally   Obers/ITB Flexibility slightly tight ITB bilaterally   Left Prone Quad Flexibility tight   Planned Therapy Interventions   Planned Therapy Interventions balance training;manual therapy;ROM;strengthening;stretching   Clinical Impression   Criteria for Skilled Therapeutic Interventions Met yes, treatment indicated   PT Diagnosis L hip pain   Influenced by the following impairments pain; decreased strength   Functional limitations due to impairments pain with standing after sitting >>30';    Clinical Presentation Stable/Uncomplicated   Clinical Decision Making (Complexity) Low complexity   Therapy Frequency 1 time/week   Predicted Duration of Therapy Intervention (days/wks) 10 weeks   Risk & Benefits of therapy have been explained Yes   Patient, Family & other staff in agreement with plan of care Yes   Clinical Impression Comments Pt will benefit from skilled intervention to decreas decrease pain, improve functional mobilty allowing her to continue working    Education Assessment   Preferred Learning Style Listening;Reading;Demonstration;Pictures/video   Barriers to Learning No barriers   ORTHO GOALS   PT Ortho Eval Goals 1;2;3   Ortho Goal 1   Goal Description walk without hip  pain after sitting in car >30'   Target Date 03/24/20   Ortho Goal 2   Goal Description Independent in HEP to continue to manage    Target Date 03/24/20   Ortho Goal 3   Goal Description climb stairs at work with little or no pain   Target Date 03/24/20   Total Evaluation Time   PT [place], Low Complexity Minutes (89382) 20

## 2020-01-15 ENCOUNTER — TELEPHONE (OUTPATIENT)
Dept: FAMILY MEDICINE | Facility: CLINIC | Age: 60
End: 2020-01-15

## 2020-01-15 NOTE — TELEPHONE ENCOUNTER
Per fax received from [facility]   - [place] is not covered by patient's Insurance Company  Dr. Mckenzie - Please choose:  1.  Change medication that is not covered to a different medication and send new prescription to patient's pharmacy?  2.  Patient will need to pay for the non-covered medication out-of-pocket?   3.  Try for Prior Authorization with Insurance Company to get medication covered?        Key# [id number]

## 2020-01-15 NOTE — TELEPHONE ENCOUNTER
Can we get more information on why it's not covered.  Is it a quantity issue.  Otherwise, we can try a prior authorization.  If we can't get approval will need to pay out of pocket.     Heather Mckenzie M.D.

## 2020-01-15 NOTE — TELEPHONE ENCOUNTER
"Okay to send for prior authorization.  I already \"approved\" to fill #60 when I sent the prescription in the first place.    Heather Mckenzie M.D.    "

## 2020-01-19 NOTE — TELEPHONE ENCOUNTER
Prior Authorization Retail Medication Request    Medication/Dose: Norco  ICD code (if different than what is on RX):    Previously Tried and Failed:  fentanyl  Rationale:  Patient has taken since 2014    Insurance Name:  Not provided  Insurance ID:  Not provided  [facility] Key: [id number]      Pharmacy Information (if different than what is on RX)  Name:    Phone:

## 2020-01-20 NOTE — TELEPHONE ENCOUNTER
Prior Authorization Approval    Authorization Effective Date: 12/21/2019  Authorization Expiration Date: 1/19/2021  Medication: Norco  Approved Dose/Quantity: 60  Reference #: [id number]   Insurance Company: [employer] - Phone [phone] Fax [phone]  Which Pharmacy is filling the prescription (Not needed for infusion/clinic administered): [facility] - - [place] - [id number] [facility]  Pharmacy Notified: Yes  Patient Notified:  **Instructed pharmacy to notify patient when script is ready to /ship.**

## 2020-01-20 NOTE — TELEPHONE ENCOUNTER
PA Initiation    Medication: Norco  Insurance Company: [facility] - Phone [phone] Fax [phone]  Pharmacy Filling the Rx: [facility] - - [place] - [id number] [facility]  Filling Pharmacy Phone: [phone]  Filling Pharmacy Fax:    Start Date: 1/20/2020    Central Prior Authorization Team   Phone: [phone]

## 2020-01-22 ENCOUNTER — HOSPITAL ENCOUNTER (OUTPATIENT)
Dept: PHYSICAL THERAPY | Facility: CLINIC | Age: 60
Setting detail: THERAPIES SERIES
End: 2020-01-22
Attending: FAMILY MEDICINE
Payer: COMMERCIAL

## 2020-01-22 ENCOUNTER — ANCILLARY PROCEDURE (OUTPATIENT)
Dept: MAMMOGRAPHY | Facility: CLINIC | Age: 60
End: 2020-01-22
Attending: FAMILY MEDICINE
Payer: COMMERCIAL

## 2020-01-22 DIAGNOSIS — Z12.31 VISIT FOR SCREENING MAMMOGRAM: ICD-10-CM

## 2020-01-22 PROCEDURE — 97110 THERAPEUTIC EXERCISES: CPT | Mod: GP | Performed by: PHYSICAL THERAPIST

## 2020-01-22 PROCEDURE — 77067 SCR MAMMO BI INCL CAD: CPT | Mod: TC

## 2020-01-28 ENCOUNTER — HOSPITAL ENCOUNTER (OUTPATIENT)
Dept: PHYSICAL THERAPY | Facility: CLINIC | Age: 60
Setting detail: THERAPIES SERIES
End: 2020-01-28
Attending: FAMILY MEDICINE
Payer: COMMERCIAL

## 2020-01-28 PROCEDURE — 97110 THERAPEUTIC EXERCISES: CPT | Mod: GP | Performed by: PHYSICAL THERAPIST

## 2020-01-28 PROCEDURE — 97140 MANUAL THERAPY 1/> REGIONS: CPT | Mod: GP | Performed by: PHYSICAL THERAPIST

## 2020-02-05 ENCOUNTER — HOSPITAL ENCOUNTER (OUTPATIENT)
Dept: PHYSICAL THERAPY | Facility: CLINIC | Age: 60
Setting detail: THERAPIES SERIES
End: 2020-02-05
Attending: FAMILY MEDICINE
Payer: COMMERCIAL

## 2020-02-05 PROCEDURE — 97110 THERAPEUTIC EXERCISES: CPT | Mod: GP | Performed by: PHYSICAL THERAPIST

## 2020-02-10 DIAGNOSIS — G89.4 CHRONIC PAIN SYNDROME: ICD-10-CM

## 2020-02-10 DIAGNOSIS — M12.9 ARTHROPATHY: ICD-10-CM

## 2020-02-10 NOTE — TELEPHONE ENCOUNTER
HYDROcodone-acetaminophen (NORCO) 5-325 MG tablet      Last Written Prescription Date:  1/10/20  Last Fill Quantity: 60,   # refills: 0  Last Office Visit: 1/10/20  Future Office visit:       Routing refill request to provider for review/approval because:  Drug not on the [facility], P or [facility] refill protocol or controlled substance

## 2020-02-13 RX ORDER — HYDROCODONE BITARTRATE AND ACETAMINOPHEN 5; 325 MG/1; MG/1
TABLET ORAL
Qty: 60 TABLET | Refills: 0 | Status: SHIPPED | OUTPATIENT
Start: 2020-02-13 | End: 2020-03-17

## 2020-02-17 ENCOUNTER — TELEPHONE (OUTPATIENT)
Dept: FAMILY MEDICINE | Facility: CLINIC | Age: 60
End: 2020-02-17

## 2020-02-17 NOTE — TELEPHONE ENCOUNTER
Reason for call:  Patient reporting a symptom    Symptom or request: Pt has had inside forearm pain x 1 week.  The pain is worse when she twists her wrist.    Please call patient and advise.      Duration (how long have symptoms been present): 1 week    Have you been treated for this before? No    Additional comments:     Phone Number patient can be reached at:  Home number on file [phone] (home)    Best Time:  any    Can we leave a detailed message on this number:  YES    Call taken on 2/17/2020 at 2:14 PM by Gloria Escobar

## 2020-02-19 ENCOUNTER — OFFICE VISIT (OUTPATIENT)
Dept: FAMILY MEDICINE | Facility: CLINIC | Age: 60
End: 2020-02-19
Payer: COMMERCIAL

## 2020-02-19 VITALS
OXYGEN SATURATION: 99 % | RESPIRATION RATE: 14 BRPM | SYSTOLIC BLOOD PRESSURE: 118 MMHG | TEMPERATURE: 97.4 F | BODY MASS INDEX: 24.07 KG/M2 | HEIGHT: 56 IN | HEART RATE: 61 BPM | DIASTOLIC BLOOD PRESSURE: 66 MMHG | WEIGHT: 107 LBS

## 2020-02-19 DIAGNOSIS — M77.12 LATERAL EPICONDYLITIS OF LEFT ELBOW: Primary | ICD-10-CM

## 2020-02-19 PROCEDURE — 99213 OFFICE O/P EST LOW 20 MIN: CPT | Performed by: FAMILY MEDICINE

## 2020-02-19 ASSESSMENT — MIFFLIN-ST. JEOR: SCORE: 928.35

## 2020-02-19 NOTE — PATIENT INSTRUCTIONS
Patient Education     Understanding Lateral Epicondylitis    Tendons are strong bands of tissue that connect muscles to bones. Lateral epicondylitis affects the tendons that connect muscles in the forearm to the lateral epicondyle. This is the bony knob on the outer side of the elbow. The condition occurs if the extensor tendons of the wrist become red and swollen (irritated). This can cause pain in the elbow, forearm, and wrist. Because the condition is sometimes caused by playing tennis, it is also known as  tennis elbow.   How to say it  LA-tuhr-kala [id number]-PTV-duh-LY-tis   What causes lateral epicondylitis?  The condition most often occurs because of overuse. This can be from any activity that repeatedly puts stress on the forearm extensor muscles or tendons and wrist. For instance, playing tennis, lifting weights, cutting meat, painting, and typing can all cause the condition. Wear and tear of the tendons from aging or an injury to the tendons can also cause the condition.  Symptoms of lateral epicondylitis  The most common symptom is pain. You may feel it on the outer side of the elbow and down the back of the forearm. It may be worse when moving or using the elbow, forearm, or wrist. You may also feel pain when gripping or lifting things.  Treatment for lateral epicondylitis  Treatments may include:    Resting the elbow, forearm, and wrist. You ll need to avoid movements that can make your symptoms worse. You also may need to avoid certain sports and types of work for a time. This helps relieve symptoms and prevent further damage to the tendons.    Changing the action that caused the problem. For instance, if the tendons were damaged from playing tennis, it may help to change your playing technique or use different equipment. This helps prevent further damage to the tendons.    Using cold packs. Putting an ice pack on the injured area can help reduce pain and swelling.    Taking pain medicines. Taking  prescription or over-the-counter pain medicines may help reduce pain and swelling.      Wearing a brace. This helps reduce strain on the muscles and tendons in the forearm, which may relieve symptoms. It is very important to wear the brace properly.    Doing exercises and physical therapy. These help improve strength and range of motion in the elbow, forearm, and wrist.    Getting shots of medicine into the injured area. These may help relieve symptoms for a time.    Having surgery. This may be an option if other treatments fail to relieve symptoms. In many cases, the surgeon removes the damaged tissue.  Possible complications of lateral epicondylitis  If the tendons involved don t heal properly, symptoms may return or get worse. To help prevent this, follow your treatment plan as directed.  When to call your healthcare provider  Call your healthcare provider right away if you have any of these:    Fever of 100.4 F (38 C) or higher, or as directed    Redness, swelling, or warmth in the elbow or forearm that gets worse    Symptoms that don t get better with treatment, or get worse    New symptoms   Date Last Reviewed: 3/10/2016    [id number] [employer]. [address] [place] [address], [place] [address]. All rights reserved. This information is not intended as a substitute for professional medical care. Always follow your healthcare professional's instructions.

## 2020-02-19 NOTE — PROGRESS NOTES
"Subjective     China Mckenzie is a 59 year old female who presents to clinic today for the following health issues:    HPI   Joint Pain    Onset: one week    Description:   Location: left forearm  Character: Dull ache and pain with twisting    Intensity: it varies    Progression of Symptoms: worse    Accompanying Signs & Symptoms:  Other symptoms: radiation of pain to upper arm    History:   Previous similar pain: no       Precipitating factors:   Trauma or overuse: YES- turning motions; turning a key    Alleviating factors:  Improved by: nothing    Therapies Tried and outcome: rest/inactivity, massage and acetaminophen        Reviewed and updated as needed this visit by Provider  Tobacco  Allergies  Meds  Problems  Med Hx  Surg Hx  Fam Hx         Review of Systems   ROS COMP: Constitutional, HEENT, cardiovascular, pulmonary, gi and gu systems are negative, except as otherwise noted.      Objective    /66 (BP Location: Right arm, Patient Position: Sitting, Cuff Size: Adult Regular)   Pulse 61   Temp 97.4  F (36.3  C) (Tympanic)   Resp 14   Ht 1.422 m (4' 8\")   Wt 48.5 kg (107 lb)   SpO2 99%   BMI 23.99 kg/m    Body mass index is 23.99 kg/m .  Physical Exam   ELBOWS: Tender over lateral epicondyle left side(s).  Resisted external rotation and extension of the wrist and fingers reproduces pain.          ASSESSMENT/PLAN:      ICD-10-CM    1. Lateral epicondylitis of left elbow M77.12 OCCUPATIONAL THERAPY REFERRAL     Start with forearm strap, stretching, icing.   If not improving, would recommend hand therapy.     Patient Instructions       Patient Education     Understanding Lateral Epicondylitis    Tendons are strong bands of tissue that connect muscles to bones. Lateral epicondylitis affects the tendons that connect muscles in the forearm to the lateral epicondyle. This is the bony knob on the outer side of the elbow. The condition occurs if the extensor tendons of the wrist become red and " swollen (irritated). This can cause pain in the elbow, forearm, and wrist. Because the condition is sometimes caused by playing tennis, it is also known as  tennis elbow.   How to say it  LA-tuhr-kala yp-en-OTE-duh-LY-tis   What causes lateral epicondylitis?  The condition most often occurs because of overuse. This can be from any activity that repeatedly puts stress on the forearm extensor muscles or tendons and wrist. For instance, playing tennis, lifting weights, cutting meat, painting, and typing can all cause the condition. Wear and tear of the tendons from aging or an injury to the tendons can also cause the condition.  Symptoms of lateral epicondylitis  The most common symptom is pain. You may feel it on the outer side of the elbow and down the back of the forearm. It may be worse when moving or using the elbow, forearm, or wrist. You may also feel pain when gripping or lifting things.  Treatment for lateral epicondylitis  Treatments may include:    Resting the elbow, forearm, and wrist. You ll need to avoid movements that can make your symptoms worse. You also may need to avoid certain sports and types of work for a time. This helps relieve symptoms and prevent further damage to the tendons.    Changing the action that caused the problem. For instance, if the tendons were damaged from playing tennis, it may help to change your playing technique or use different equipment. This helps prevent further damage to the tendons.    Using cold packs. Putting an ice pack on the injured area can help reduce pain and swelling.    Taking pain medicines. Taking prescription or over-the-counter pain medicines may help reduce pain and swelling.      Wearing a brace. This helps reduce strain on the muscles and tendons in the forearm, which may relieve symptoms. It is very important to wear the brace properly.    Doing exercises and physical therapy. These help improve strength and range of motion in the elbow, forearm, and  wrist.    Getting shots of medicine into the injured area. These may help relieve symptoms for a time.    Having surgery. This may be an option if other treatments fail to relieve symptoms. In many cases, the surgeon removes the damaged tissue.  Possible complications of lateral epicondylitis  If the tendons involved don t heal properly, symptoms may return or get worse. To help prevent this, follow your treatment plan as directed.  When to call your healthcare provider  Call your healthcare provider right away if you have any of these:    Fever of 100.4 F (38 C) or higher, or as directed    Redness, swelling, or warmth in the elbow or forearm that gets worse    Symptoms that don t get better with treatment, or get worse    New symptoms   Date Last Reviewed: 3/10/2016    [id number] [employer]. [address] [place] [address], [place] [address]. All rights reserved. This information is not intended as a substitute for professional medical care. Always follow your healthcare professional's instructions.

## 2020-02-27 ENCOUNTER — HOSPITAL ENCOUNTER (OUTPATIENT)
Dept: OCCUPATIONAL THERAPY | Facility: CLINIC | Age: 60
Setting detail: THERAPIES SERIES
End: 2020-02-27
Attending: FAMILY MEDICINE
Payer: COMMERCIAL

## 2020-02-27 DIAGNOSIS — M77.12 LATERAL EPICONDYLITIS OF LEFT ELBOW: ICD-10-CM

## 2020-02-27 PROCEDURE — 97760 ORTHOTIC MGMT&TRAING 1ST ENC: CPT | Mod: GO | Performed by: OCCUPATIONAL THERAPIST

## 2020-02-27 PROCEDURE — 97535 SELF CARE MNGMENT TRAINING: CPT | Mod: GO | Performed by: OCCUPATIONAL THERAPIST

## 2020-02-27 PROCEDURE — 97110 THERAPEUTIC EXERCISES: CPT | Mod: GO | Performed by: OCCUPATIONAL THERAPIST

## 2020-02-27 PROCEDURE — 97165 OT EVAL LOW COMPLEX 30 MIN: CPT | Mod: GO | Performed by: OCCUPATIONAL THERAPIST
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
## 2020-03-16 DIAGNOSIS — G89.4 CHRONIC PAIN SYNDROME: ICD-10-CM

## 2020-03-16 DIAGNOSIS — M12.9 ARTHROPATHY: ICD-10-CM

## 2020-03-16 NOTE — TELEPHONE ENCOUNTER
Requested Prescriptions   Pending Prescriptions Disp Refills     HYDROcodone-acetaminophen (NORCO) 5-325 MG tablet [Pharmacy Med Name: hydrocodone 5 mg-acetaminophen 325 mg tablet] 60 tablet 0     Sig: TAKE 1 TABLET BY MOUTH TWICE DAILY AS NEEDED FOR PAIN       There is no refill protocol information for this order              Last Written Prescription Date:  2/13/2020  Last Fill Quantity: 60,   # refills: 0  Last Office Visit: 2/19/2020 with Jonah   Future Office visit:       Routing refill request to provider for review/approval because:  Drug not on the [facility], [facility] or [facility] refill protocol or controlled substance

## 2020-03-17 RX ORDER — HYDROCODONE BITARTRATE AND ACETAMINOPHEN 5; 325 MG/1; MG/1
TABLET ORAL
Qty: 60 TABLET | Refills: 0 | Status: SHIPPED | OUTPATIENT
Start: 2020-03-17 | End: 2020-05-01

## 2020-04-10 ENCOUNTER — HOSPITAL ENCOUNTER (OUTPATIENT)
Dept: PHYSICAL THERAPY | Facility: CLINIC | Age: 60
Setting detail: THERAPIES SERIES
End: 2020-04-10
Attending: FAMILY MEDICINE
Payer: COMMERCIAL

## 2020-04-10 PROCEDURE — 97110 THERAPEUTIC EXERCISES: CPT | Mod: GP,GT | Performed by: PHYSICAL THERAPIST

## 2020-04-10 NOTE — PROGRESS NOTES
China Mckenzie is a 59 year old female who is being seen via a billable video visit.      Patient has given verbal consent for Video visit? Yes    Video Start Time: 14:44    Telehealth Visit Details    Type of Service:  Telehealth    Video End Time (time video stopped): 15:36    Originating Location (pt. location): Home    Additional Participants in Telehealth Visit: none    Distant Location (provider location):  [facility] PHYSICAL THERAPY     Mode of Communication (Audio Visual or Audio Only):  audio and visual    Linda Landa, PT  April 10, 2020

## 2020-04-10 NOTE — PROGRESS NOTES
China Mckenzie  1960  Heather Mckenzie MD  [address] [place] [address]  Diagnosis:  L hip pain Physical Therapy Progress Note  04/10/20 1500   Signing Clinician's Name / Credentials   Signing clinician's name / credentials Linda Landa PT, DPT   Session Number   Session Number 5/20 [facility] 1/14/2020   Progress Note/Recertification   Progress Note Due Date 02/14/20   Progress Note Completed Date 04/10/20   Ortho Goal 1   Goal Description walk without hip pain after sitting in car >30'  (still painful 4/10/2020)   Target Date 06/19/20   Ortho Goal 2   Goal Description Independent in HEP to continue to manage   (continues to require guidance)   Target Date 06/19/20   Ortho Goal 3   Goal Description climb stairs at work with little or no pain  (still painful)   Target Date 06/19/20   Subjective Report   Subjective Report still painful standing from sitting;    Therapeutic Procedure/exercise   Therapeutic Procedures: strength, endurance, ROM, flexibillity minutes (02420) 35   Skilled Intervention exercise instruction verbal and visual cueing for proper technique   Patient Response better quality with lunge   Treatment Detail DC ITB stretch in supine as not performing correctly; supine with L foot on outside of R knee for stretch; reviewed sidely hip abd; bridge, lunge, standing quad stretch   Progress strengthening   Education   Learner Patient   Readiness Acceptance   Method Booklet/handout;Explanation;Demonstration   Response Needs Reinforcement;Verbalizes Understanding   Education Comments vhi   Plan   Home program above modifications   Updates to plan of care telehealth visitsee in 4 weeks to review and progress ex   Plan for next session CORE; SLR   Total Session Time   Timed Code Treatment Minutes 35   Total Treatment Time (sum of timed and untimed services) 35      Bladder non-tender and non-distended. Urine clear yellow.

## 2020-04-15 DIAGNOSIS — G43.009 MIGRAINE WITHOUT AURA AND WITHOUT STATUS MIGRAINOSUS, NOT INTRACTABLE: ICD-10-CM

## 2020-04-15 DIAGNOSIS — G89.4 CHRONIC PAIN SYNDROME: ICD-10-CM

## 2020-04-15 DIAGNOSIS — M12.9 ARTHROPATHY: ICD-10-CM

## 2020-04-16 NOTE — TELEPHONE ENCOUNTER
"Requested Prescriptions   Pending Prescriptions Disp Refills     FLUoxetine (PROZAC) 20 MG capsule [Pharmacy Med Name: fluoxetine 20 mg capsule] 90 capsule 3     Sig: TAKE 1 CAPSULE BY MOUTH EVERY DAY  Last Written Prescription Date:  4/15/2019 d/c'd 1/10/2020  Last Fill Quantity: 90,  # refills: 3   Last office visit: 2/19/2020 with prescribing provider:  Jason   Future Office Visit:           SSRIs Protocol Failed - 4/15/2020  3:08 PM  RON-7 SCORE 5/3/2017 5/23/2018 10/15/2018   Total Score - - -   Total Score 5 6 14     PHQ 10/15/2018 4/15/2019 1/10/2020   PHQ-9 Total Score 15 5 2   Q9: Thoughts of better off dead/self-harm past 2 weeks Not at all Not at all Not at all           Failed - Medication is active on med list        Passed - Recent (12 mo) or future (30 days) visit within the authorizing provider's specialty     Patient has had an office visit with the authorizing provider or a provider within the authorizing providers department within the previous 12 mos or has a future within next 30 days. See \"Patient Info\" tab in inbasket, or \"Choose Columns\" in Meds & Orders section of the refill encounter.              Passed - Patient is age 18 or older        Passed - No active pregnancy on record        Passed - No positive pregnancy test in last 12 months           SUMAtriptan (IMITREX) 100 MG tablet [Pharmacy Med Name: sumatriptan 100 mg tablet] 18 tablet 5     Sig: TAKE 1 TABLET BY MOUTH AT ONSET OF MIGRAINE. MAY REPEAT ONCE AFTER 2 HOURS IF NEEDED  Last Written Prescription Date:  4/15/2019  Last Fill Quantity: 18,  # refills: 5   Last office visit: 2/19/2020 with prescribing provider:  Jason    Future Office Visit:           Serotonin Agonists Failed - 4/15/2020  3:08 PM        Failed - Serotonin Agonist request needs review.     Please review patient's record. If patient has had 8 or more treatments in the past month, please forward to provider.          Passed - Blood pressure under 140/90 in " "past 12 months     BP Readings from Last 3 Encounters:   02/19/20 118/66   01/10/20 112/62   09/06/19 118/64                 Passed - Recent (12 mo) or future (30 days) visit within the authorizing provider's specialty     Patient has had an office visit with the authorizing provider or a provider within the authorizing providers department within the previous 12 mos or has a future within next 30 days. See \"Patient Info\" tab in inbasket, or \"Choose Columns\" in Meds & Orders section of the refill encounter.              Passed - Medication is active on med list        Passed - Patient is age 18 or older        Passed - No active pregnancy on record        Passed - No positive pregnancy test in past 12 months           HYDROcodone-acetaminophen (NORCO) 5-325 MG tablet [Pharmacy Med Name: hydrocodone 5 mg-acetaminophen 325 mg tablet] 60 tablet 0     Sig: TAKE 1 TABLET BY MOUTH TWICE DAILY AS NEEDED FOR PAIN       There is no refill protocol information for this order        Last Written Prescription Date:  4/15/2019  Last Fill Quantity: 60,   # refills: 0  Last Office Visit: 2/19/2020 virtual visit with    Future Office visit:       Routing refill request to provider for review/approval because:  Drug not on the G, P or  [employer] refill protocol or controlled substance            "

## 2020-04-17 RX ORDER — SUMATRIPTAN 100 MG/1
TABLET, FILM COATED ORAL
Qty: 8 TABLET | Refills: 0 | Status: SHIPPED | OUTPATIENT
Start: 2020-04-17 | End: 2020-04-20

## 2020-04-17 RX ORDER — HYDROCODONE BITARTRATE AND ACETAMINOPHEN 5; 325 MG/1; MG/1
TABLET ORAL
Qty: 60 TABLET | Refills: 0 | OUTPATIENT
Start: 2020-04-17

## 2020-04-17 NOTE — TELEPHONE ENCOUNTER
Per patient she started taking the fluoxetine again due to stress.  Needs imitrex today she is out for her migraines.      Appointment made for telephone visit on Monday 4/20 with Dr. Mckenzie.  She tried a video visit with PT last week and did not go well.  Her daughter had to come help her.  She would prefer a telephone visit.      Sarah Scott RN

## 2020-04-20 ENCOUNTER — VIRTUAL VISIT (OUTPATIENT)
Dept: FAMILY MEDICINE | Facility: CLINIC | Age: 60
End: 2020-04-20
Payer: COMMERCIAL

## 2020-04-20 DIAGNOSIS — M12.9 ARTHROPATHY: ICD-10-CM

## 2020-04-20 DIAGNOSIS — M62.830 BACK MUSCLE SPASM: ICD-10-CM

## 2020-04-20 DIAGNOSIS — G43.009 MIGRAINE WITHOUT AURA AND WITHOUT STATUS MIGRAINOSUS, NOT INTRACTABLE: ICD-10-CM

## 2020-04-20 DIAGNOSIS — G89.4 CHRONIC PAIN SYNDROME: ICD-10-CM

## 2020-04-20 DIAGNOSIS — F41.1 GENERALIZED ANXIETY DISORDER: Primary | ICD-10-CM

## 2020-04-20 PROCEDURE — 99214 OFFICE O/P EST MOD 30 MIN: CPT | Mod: 95 | Performed by: FAMILY MEDICINE

## 2020-04-20 RX ORDER — CYCLOBENZAPRINE HCL 10 MG
10 TABLET ORAL 3 TIMES DAILY PRN
Qty: 30 TABLET | Refills: 5 | Status: SHIPPED | OUTPATIENT
Start: 2020-04-20 | End: 2021-04-29

## 2020-04-20 RX ORDER — HYDROCODONE BITARTRATE AND ACETAMINOPHEN 5; 325 MG/1; MG/1
TABLET ORAL
Qty: 60 TABLET | Refills: 0 | Status: SHIPPED | OUTPATIENT
Start: 2020-05-20 | End: 2020-07-29

## 2020-04-20 RX ORDER — FLUOXETINE 40 MG/1
40 CAPSULE ORAL DAILY
Qty: 90 CAPSULE | Refills: 1 | Status: SHIPPED | OUTPATIENT
Start: 2020-04-20 | End: 2020-07-29

## 2020-04-20 RX ORDER — SUMATRIPTAN 100 MG/1
100 TABLET, FILM COATED ORAL
Qty: 8 TABLET | Refills: 11 | Status: SHIPPED | OUTPATIENT
Start: 2020-04-20 | End: 2020-09-09

## 2020-04-20 RX ORDER — HYDROCODONE BITARTRATE AND ACETAMINOPHEN 5; 325 MG/1; MG/1
TABLET ORAL
Qty: 60 TABLET | Refills: 0 | Status: SHIPPED | OUTPATIENT
Start: 2020-04-20 | End: 2020-07-24

## 2020-04-20 RX ORDER — HYDROCODONE BITARTRATE AND ACETAMINOPHEN 5; 325 MG/1; MG/1
TABLET ORAL
Qty: 60 TABLET | Refills: 0 | Status: CANCELLED | OUTPATIENT
Start: 2020-04-20

## 2020-04-20 ASSESSMENT — PATIENT HEALTH QUESTIONNAIRE - PHQ9: SUM OF ALL RESPONSES TO PHQ QUESTIONS 1-9: 0

## 2020-04-20 NOTE — PROGRESS NOTES
"China Mckenzie is a 59 year old female who is being evaluated via a billable telephone visit.      The patient has been notified of following:     \"This telephone visit will be conducted via a call between you and your physician/provider. We have found that certain health care needs can be provided without the need for a physical exam.  This service lets us provide the care you need with a short phone conversation.  If a prescription is necessary we can send it directly to your pharmacy.  If lab work is needed we can place an order for that and you can then stop by our lab to have the test done at a later time.    Telephone visits are billed at different rates depending on your insurance coverage. During this emergency period, for some insurers they may be billed the same as an in-person visit.  Please reach out to your insurance provider with any questions.    If during the course of the call the physician/provider feels a telephone visit is not appropriate, you will not be charged for this service.\"    Patient has given verbal consent for Telephone visit?  Yes    How would you like to obtain your AVS? Mail a copy    Subjective     China Mckenzie is a 59 year old female who presents to clinic today for the following health issues:    Medication Followup of Vicodin, flexeril     Taking Medication as prescribed: yes    Side Effects:  None    Medication Helping Symptoms:  yes     Migraine     Since your last clinic visit, how have your headaches changed?  Worsened    How often are you getting headaches or migraines? daily     Are you able to do normal daily activities when you have a migraine? No    Are you taking rescue/relief medications? (Select all that apply) sumatriptan (Imitrex) and essential oils on temples     How helpful is your rescue/relief medication?  I get some relief    Are you taking any medications to prevent migraines? (Select all that apply)  No    In the past 4 weeks, how often have you " "gone to urgent care or the emergency room because of your headaches?  0      How many servings of fruits and vegetables do you eat daily?  1-2    On average, how many sweetened beverages do you drink each day (Examples: soda, juice, sweet tea, etc.  Do NOT count diet or artificially sweetened beverages)?   0    How many days per week do you exercise enough to make your heart beat faster? 5    How many minutes a day do you exercise enough to make your heart beat faster? 30 - 60    How many days per week do you miss taking your medication? N/a all medication is usually PRN     Anxiety Follow-Up    How are you doing with your anxiety since your last visit? Worsened - with the COVID pandemic, she is anxious about the economy and the \"blame being placed on President Mickey who has been placed in an impossible situation\".     She rested fluoxetine 20 mg and many days has been taking two pills now for 40 mg.  Feels that the dose could be increased.    Are you having other symptoms that might be associated with anxiety? No    Have you had a significant life event? No     Are you feeling depressed? No    Do you have any concerns with your use of alcohol or other drugs? No    Social History     Tobacco Use     Smoking status: Never Smoker     Smokeless tobacco: Never Used   Substance Use Topics     Alcohol use: Yes     Comment: occ     Drug use: No     RON-7 SCORE 5/3/2017 5/23/2018 10/15/2018   Total Score - - -   Total Score 5 6 14     PHQ 4/15/2019 1/10/2020 4/20/2020   PHQ-9 Total Score 5 2 0   Q9: Thoughts of better off dead/self-harm past 2 weeks Not at all Not at all Not at all          Reviewed and updated as needed this visit by Provider         Review of Systems   ROS COMP: Constitutional, HEENT, cardiovascular, pulmonary, gi and gu systems are negative, except as otherwise noted.       Objective   Reported vitals:  There were no vitals taken for this visit.   healthy, alert and no distress  PSYCH: Alert and oriented " times 3; coherent speech, normal   rate and volume, able to articulate logical thoughts, able   to abstract reason, no tangential thoughts, no hallucinations   or delusions  Her affect is normal  RESP: No cough, no audible wheezing, able to talk in full sentences  Remainder of exam unable to be completed due to telephone visits    Diagnostic Test Results:  Labs reviewed in Epic        Assessment/Plan:  1. Arthropathy  On average takes about 60 month.  - HYDROcodone-acetaminophen (NORCO) 5-325 MG tablet; TAKE 1 TABLET BY MOUTH TWICE DAILY AS NEEDED FOR PAIN  Dispense: 60 tablet; Refill: 0  - HYDROcodone-acetaminophen (NORCO) 5-325 MG tablet; TAKE 1 TABLET BY MOUTH TWICE DAILY AS NEEDED FOR PAIN  Dispense: 60 tablet; Refill: 0    2. Chronic pain syndrome   stable  - HYDROcodone-acetaminophen (NORCO) 5-325 MG tablet; TAKE 1 TABLET BY MOUTH TWICE DAILY AS NEEDED FOR PAIN  Dispense: 60 tablet; Refill: 0  - HYDROcodone-acetaminophen (NORCO) 5-325 MG tablet; TAKE 1 TABLET BY MOUTH TWICE DAILY AS NEEDED FOR PAIN  Dispense: 60 tablet; Refill: 0    3. Migraine without aura and without status migrainosus, not intractable   increased headaches with increased stress/anxiety.  May be partially tension type headaches.    - SUMAtriptan (IMITREX) 100 MG tablet; Take 1 tablet (100 mg) by mouth at onset of headache for migraine  Dispense: 8 tablet; Refill: 11    4. Back muscle spasm     - cyclobenzaprine (FLEXERIL) 10 MG tablet; Take 1 tablet (10 mg) by mouth 3 times daily as needed for muscle spasms  Dispense: 30 tablet; Refill: 5    5. Generalized anxiety disorder  Increase to 40 mg daily - new prescription sent to pharmacy.   - FLUoxetine (PROZAC) 40 MG capsule; Take 1 capsule (40 mg) by mouth daily  Dispense: 90 capsule; Refill: 1    No follow-ups on file.    This patient was seen virtually during the COVID-19 outbreak in attempts to keep healthy patients out of the clinic per CDC guidelines (practicing social distancing).  I  evaluated them by phone/evisit and the note reflects our conversation/visit.      Refill done during COVID 19 pandemic.  Given a 90 day supply of medications to avoid unnecessary clinic visits at this time.    Recommend in-person visit in 3 months          Phone call duration:  9 minutes    Heather Mckenzie MD

## 2020-04-20 NOTE — PATIENT INSTRUCTIONS
Our Clinic hours are:  Mondays    7:20 am - 7 pm  Tues -  Fri  7:20 am - 5 pm    Clinic Phone: [phone]    The clinic lab opens at 7:30 am Mon - Fri and appointments are required.    [facility]. [phone]  Monday  8 am - 7pm  Tues - Fri 8 am - 5:30 pm

## 2020-04-29 NOTE — PROGRESS NOTES
02/27/20   Note: This is a copy of patient's last daily visit and will also serve as their Discharge Summary as they have not been in for further treatment 30 days past this date. Final assessment of goals and physical and functional status , therefore unavailable.     Signing Clinician's Name / Credentials   Signing clinician's name / credentials Ayala Hamm OTR/L CHT   Session Number   Session Number 1   Authorization status 20- [employer]   Quick Add   Quick Add  Hand   Hand   Referring Physician Heather Mckenzie MD   Medical Diagnosis Left Lateral Epicondylitis   Orders Evaluate And Treat As Indicated   Adult OT Eval Goals   Hand Eval Goals 1;2;3   Hand Goal 1   Goal Identifier sleeping   Goal Description patient to report 75-80%  improvement in sleeping throught the night   Target Date 03/27/20   Hand Goal 2   Goal Identifier driving   Goal Description patient to report 75% improvement in gripping steering wheel with left hand and being able to make a turn with minimal pain and difficulty   Target Date 04/10/20   Hand Goal 3   Goal Identifier opening jar   Goal Description Patient to go from extreme difficulty to mild difficulty  with pain less than 2/10 with opening a tight new jar   Target Date 04/10/20   Subjective Report   Subjective Report see eval   Initial Pain level 9/10  (at worst 6 at best)   Objective Measures   ROM Comments see eval   Therapeutic Interventions   Therapeutic Interventions Self Care/Home Management;Therapeutic Procedure/Exercise;Orthotics   Self Care/Home Management   Self-Care/Home Mgmt/ADL, Compensatory, Meal Prep Minutes (82355) 15 Minutes   Skilled Intervention teaching and education   Treatment Detail education on activity modification relating to diagnosis, when to wear counterforce brace, how to lift, drive, dleep, ect   Therapeutic Procedure/Exercise   Therapeutic Procedure: strength, endurance, ROM, flexibillity minutes (99052) 8   Skilled Intervention verbal and physical  cuing needed to perform properly   HAND Therapeutic Exercise Other Exercises/Activities   Other Exer/Activities/Educ   Exercise 1 extrinsic stretches and cross over stretch   Description 1 x 5 with HEP instruction   Orthotics   Orthotic Assessment, Initial session minutes (51626) 8   Progress fit patient with counterforce brace to make it more comfortable and fit better. took out pillow and used gel - instructed to put in fridge if needee for  cool application alos education on when and wear to wear conterforce brace   Education   Learner Family   Readiness Eager   Method Booklet/handout;Explanation;Demonstration   Response Verbalizes understanding;Demonstrates understanding   Plan   Home program counterforce brace wear, stretching , masssage, ice packs and trial of heat, activity modification   Plan for next session start us and man- check ex   Total Session Time   Timed Code Treatment Minutes 31   Total Treatment Time (sum of timed and untimed services) 56   AMBULATORY CLINICS ONLY-MEDICAL AND TREATMENT DIAGNOSIS   Medical Diagnosis Left Lateral Epicondylitis   Ayala Hamm, BLANCHER/L CHT  Occupational Therapist, Certified Hand Therapist

## 2020-05-01 ENCOUNTER — VIRTUAL VISIT (OUTPATIENT)
Dept: FAMILY MEDICINE | Facility: CLINIC | Age: 60
End: 2020-05-01
Payer: COMMERCIAL

## 2020-05-01 DIAGNOSIS — J06.9 VIRAL UPPER RESPIRATORY INFECTION: Primary | ICD-10-CM

## 2020-05-01 PROCEDURE — 99213 OFFICE O/P EST LOW 20 MIN: CPT | Mod: 95 | Performed by: FAMILY MEDICINE

## 2020-05-01 NOTE — PATIENT INSTRUCTIONS
Our Clinic hours are:  Mondays    7:20 am - 7 pm  Tues -  Fri  7:20 am - 5 pm    Clinic Phone: [phone]    The clinic lab opens at 7:30 am Mon - Fri and appointments are required.    [facility]. [phone]  Monday  8 am - 7pm  Tues - Fri 8 am - 5:30 pm

## 2020-05-01 NOTE — PROGRESS NOTES
"China Mckenzie is a 59 year old female who is being evaluated via a billable telephone visit.      The patient has been notified of following:     \"This telephone visit will be conducted via a call between you and your physician/provider. We have found that certain health care needs can be provided without the need for a physical exam.  This service lets us provide the care you need with a short phone conversation.  If a prescription is necessary we can send it directly to your pharmacy.  If lab work is needed we can place an order for that and you can then stop by our lab to have the test done at a later time.    Telephone visits are billed at different rates depending on your insurance coverage. During this emergency period, for some insurers they may be billed the same as an in-person visit.  Please reach out to your insurance provider with any questions.    If during the course of the call the physician/provider feels a telephone visit is not appropriate, you will not be charged for this service.\"    Patient has given verbal consent for Telephone visit?  Yes    How would you like to obtain your AVS? Mail a copy    Subjective     China Mckenzie is a 59 year old female who presents to clinic today for the following health issues:    HPI  Acute Illness   Acute illness concerns: Patient is describing her symptoms as breathing up water and feeling funny in her sinus  Onset: 3-4 days    Fever: no    Chills/Sweats: no    Headache (location?): no    Sinus Pressure:YES- tender, post-nasal drainage, facial pain and teeth pain    Conjunctivitis:  YES: left    Ear Pain: ?tooth pain    Rhinorrhea: no    Congestion: no    Sore Throat: no     Cough: no    Wheeze: no    Decreased Appetite: no    Nausea: no    Vomiting: no    Diarrhea:  no    Dysuria/Freq.: no    Fatigue/Achiness: YES- left side of face    Sick/Strep Exposure: YES- client had fever      Therapies Tried and outcome: Imitrex not of help, tylenol X 2  Tried " norco- that didn't help  Tried tylenol = no help  imitrex - no help          Reviewed and updated as needed this visit by Provider         Review of Systems   ROS COMP: Constitutional, HEENT, cardiovascular, pulmonary, gi and gu systems are negative, except as otherwise noted.       Objective   Reported vitals:  There were no vitals taken for this visit.   healthy, alert and no distress  PSYCH: Alert and oriented times 3; coherent speech, normal   rate and volume, able to articulate logical thoughts, able   to abstract reason, no tangential thoughts, no hallucinations   or delusions  Her affect is normal  RESP: No cough, no audible wheezing, able to talk in full sentences  Remainder of exam unable to be completed due to telephone visits    Diagnostic Test Results:  Labs reviewed in Epic        Assessment/Plan:  1. Viral upper respiratory infection  Suspect viral infection.    I discussed the pathophysiology of sinus pressure/sinusitis and treatment options with emphasis on healty lifestyle and opening up natural drainage passages.  I discussed the risks and benefits of various over the counter and prescription treatments including short courses of decongestant nasal sprays.  Recheck if not improving as expected    Recommend Afrin x 3 days, sudafed over the counter.      No antibiotic indicated at this time - discussed that antibiotic would not be indicated unless symptoms >10 days and worsening.     Watch for fever/chills/myalgias.  My suspicion for COVID is very low in this patient.  No indication for testing at this time.     No follow-ups on file.      Phone call duration:  6 minutes    Heather Mckenzie MD

## 2020-05-05 ENCOUNTER — TELEPHONE (OUTPATIENT)
Dept: FAMILY MEDICINE | Facility: CLINIC | Age: 60
End: 2020-05-05

## 2020-05-05 NOTE — TELEPHONE ENCOUNTER
"Pt went to [facility] on Sunday.  Positive COVID 19 test results yesterday.  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  She is unsure how they were infected as the client has been in quarantine at home since March.  No visitors to client except the client's sister who visited on [facility] 4/12/20.  This sister stayed at least 10 ft. Away at all times but did drop off a couple of [facility] Baskets.  Sister had just returned from [place] at the end of March.     Per patient, she feels weak and tired.  Body aches and headache.  Has had symptoms since last week but got worse.  Cough with deep breaths.  Sinus symptoms improved.  Patient feels chills, has not taken temp.  Using OTC cough medication.  Not using tylenol or ibuprofen.  Can speak in complete sentences and is not having shortness of breath.  She is wondering what she should do.    Discussed self quarantine until symptoms are improving and fever free and improvement in respiratory symptoms for at least 72 hours - then needs 2 negative COVID 19 tests before returning to work.   is not having symptoms, advised patient to self isolate away from her spouse but patient states \"we sleep together he's probably already exposed\".  Advised that neither she or her spouse should leave the house, she does have family that can run her errands and drop off groceries, etc.      Discussed when she should seek emergent care.  Patient verbalizes understanding.  Dr. Jonah Scott, RN    "

## 2020-05-05 NOTE — TELEPHONE ENCOUNTER
Reason for call:    Symptom or request:     Patient called stating that she has covid -19 diagnosis last night.      Reports when she breathes in she starts coughing tired weak and body aches. Ha.    Patient has questions about illness, is there a med she can take? What to expect.     Best Time:  any    Can we leave a detailed message on this number?  YES     Nereida ISLAS  Station

## 2020-05-15 ENCOUNTER — VIRTUAL VISIT (OUTPATIENT)
Dept: FAMILY MEDICINE | Facility: CLINIC | Age: 60
End: 2020-05-15
Payer: COMMERCIAL

## 2020-05-15 ENCOUNTER — TELEPHONE (OUTPATIENT)
Dept: FAMILY MEDICINE | Facility: CLINIC | Age: 60
End: 2020-05-15

## 2020-05-15 DIAGNOSIS — U07.1 2019 NOVEL CORONAVIRUS DISEASE (COVID-19): Primary | ICD-10-CM

## 2020-05-15 DIAGNOSIS — Z12.11 SPECIAL SCREENING FOR MALIGNANT NEOPLASMS, COLON: ICD-10-CM

## 2020-05-15 PROCEDURE — 99213 OFFICE O/P EST LOW 20 MIN: CPT | Mod: 95 | Performed by: NURSE PRACTITIONER

## 2020-05-15 SDOH — HEALTH STABILITY: MENTAL HEALTH: HOW OFTEN DO YOU HAVE A DRINK CONTAINING ALCOHOL?: NEVER

## 2020-05-15 NOTE — LETTER
[facility]  [address]  [facility] [address]  Phone: [phone]    May 15, 2020        China Mckenzie  [address] [place] [address]          To whom it may concern:    RE: China Mckenzie    Patient was seen and treated today at our clinic.  She came down with upper respiratory symptoms and was tested positive for COVID 19 on May 3rd.  She is 10 days free of symptoms and has no fevers with no use of OTC medications over 3 days.  This meets CDC guidelines for returning to work with no restrictions.  She will need to wear proper PPE for any clients that are positive for the virus still.    Please contact me for questions or concerns.      Sincerely,        Etelvina Galeas NP

## 2020-05-15 NOTE — PATIENT INSTRUCTIONS
1.  Work letter faxed as discussed.  Return to work with no restrictions.  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    2.  Follow-up if any recurrent symptoms.

## 2020-05-15 NOTE — TELEPHONE ENCOUNTER
Reason for Call:  Other call back    Detailed comments: Pt calling requesting a return to work note after testing positive for Covid, she is feeling much better. 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 Yes    Call taken on 5/15/2020 at 11:51 AM by Sona Castillo
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
## 2020-07-21 ENCOUNTER — TELEPHONE (OUTPATIENT)
Dept: FAMILY MEDICINE | Facility: CLINIC | Age: 60
End: 2020-07-21

## 2020-07-21 DIAGNOSIS — M12.9 ARTHROPATHY: ICD-10-CM

## 2020-07-21 DIAGNOSIS — G89.4 CHRONIC PAIN SYNDROME: ICD-10-CM

## 2020-07-24 ENCOUNTER — TELEPHONE (OUTPATIENT)
Dept: FAMILY MEDICINE | Facility: CLINIC | Age: 60
End: 2020-07-24

## 2020-07-24 RX ORDER — HYDROCODONE BITARTRATE AND ACETAMINOPHEN 5; 325 MG/1; MG/1
TABLET ORAL
Qty: 60 TABLET | Refills: 0 | Status: SHIPPED | OUTPATIENT
Start: 2020-07-24 | End: 2020-08-31

## 2020-07-24 NOTE — TELEPHONE ENCOUNTER
Refilled x 1.  Needs visit (virutal/VIDEO okay) before next refill.  Controlled substance agreement is that I see her or do a visit q 3 months.      I'm available for [employer] [facility] 7/30/20.    Heather Mckenzie M.D.

## 2020-07-24 NOTE — TELEPHONE ENCOUNTER
Left message for patient on self identified voicemail to call back and schedule appointment PRIOR to next fill.    Sarah Scott RN

## 2020-07-24 NOTE — TELEPHONE ENCOUNTER
Requested Prescriptions   Pending Prescriptions Disp Refills     HYDROcodone-acetaminophen (NORCO) 5-325 MG tablet [Pharmacy Med Name: hydrocodone 5 mg-acetaminophen 325 mg tablet] 60 tablet 0     Sig: TAKE 1 TABLET BY MOUTH TWICE DAILY AS NEEDED FOR PAIN       There is no refill protocol information for this order

## 2020-07-24 NOTE — TELEPHONE ENCOUNTER
Reason for Call:  Other FYI    Detailed comments: Pt wanted to notify you that she would like to talk to you about a possible higher dose of Norco when she has her video visit with you. She was very worried about how to bring this up and wanted you to be aware that she will be asking about it at her next appt.    Phone Number Patient can be reached at: Home number on file [phone] (home)    Best Time: any    Can we leave a detailed message on this number? YES    Call taken on 7/24/2020 at 10:59 AM by Sona Castillo

## 2020-07-29 ENCOUNTER — VIRTUAL VISIT (OUTPATIENT)
Dept: FAMILY MEDICINE | Facility: CLINIC | Age: 60
End: 2020-07-29
Payer: COMMERCIAL

## 2020-07-29 DIAGNOSIS — M12.9 ARTHROPATHY: ICD-10-CM

## 2020-07-29 DIAGNOSIS — F41.1 GENERALIZED ANXIETY DISORDER: ICD-10-CM

## 2020-07-29 DIAGNOSIS — R94.4 DECREASED GFR: Primary | ICD-10-CM

## 2020-07-29 PROCEDURE — 99214 OFFICE O/P EST MOD 30 MIN: CPT | Mod: 95 | Performed by: FAMILY MEDICINE

## 2020-07-29 PROCEDURE — 96127 BRIEF EMOTIONAL/BEHAV ASSMT: CPT | Performed by: FAMILY MEDICINE

## 2020-07-29 RX ORDER — FLUOXETINE 40 MG/1
40 CAPSULE ORAL DAILY
Qty: 90 CAPSULE | Refills: 1 | Status: SHIPPED | OUTPATIENT
Start: 2020-07-29 | End: 2021-02-08

## 2020-07-29 ASSESSMENT — ANXIETY QUESTIONNAIRES
5. BEING SO RESTLESS THAT IT IS HARD TO SIT STILL: MORE THAN HALF THE DAYS
GAD7 TOTAL SCORE: 10
6. BECOMING EASILY ANNOYED OR IRRITABLE: NOT AT ALL
1. FEELING NERVOUS, ANXIOUS, OR ON EDGE: MORE THAN HALF THE DAYS
IF YOU CHECKED OFF ANY PROBLEMS ON THIS QUESTIONNAIRE, HOW DIFFICULT HAVE THESE PROBLEMS MADE IT FOR YOU TO DO YOUR WORK, TAKE CARE OF THINGS AT HOME, OR GET ALONG WITH OTHER PEOPLE: VERY DIFFICULT
2. NOT BEING ABLE TO STOP OR CONTROL WORRYING: NOT AT ALL
7. FEELING AFRAID AS IF SOMETHING AWFUL MIGHT HAPPEN: NOT AT ALL
3. WORRYING TOO MUCH ABOUT DIFFERENT THINGS: NEARLY EVERY DAY

## 2020-07-29 ASSESSMENT — PATIENT HEALTH QUESTIONNAIRE - PHQ9
5. POOR APPETITE OR OVEREATING: NEARLY EVERY DAY
SUM OF ALL RESPONSES TO PHQ QUESTIONS 1-9: 11

## 2020-07-29 NOTE — PROGRESS NOTES
"China Mckenzie is a 59 year old female who is being evaluated via a billable telephone visit.      The patient has been notified of following:     \"This telephone visit will be conducted via a call between you and your physician/provider. We have found that certain health care needs can be provided without the need for a physical exam.  This service lets us provide the care you need with a short phone conversation.  If a prescription is necessary we can send it directly to your pharmacy.  If lab work is needed we can place an order for that and you can then stop by our lab to have the test done at a later time.    Telephone visits are billed at different rates depending on your insurance coverage. During this emergency period, for some insurers they may be billed the same as an in-person visit.  Please reach out to your insurance provider with any questions.    If during the course of the call the physician/provider feels a telephone visit is not appropriate, you will not be charged for this service.\"    Patient has given verbal consent for Telephone visit?  No    What phone number would you like to be contacted at? [phone]    How would you like to obtain your AVS? Mail a copy    Subjective     China Mckenzie is a 59 year old female who presents via phone visit today for the following health issues:    HPI  Chief Complaint   Patient presents with     Refill Request     Patient would like to discuss increasing her pain medication. Patient is taking atleast 2-2.5 a day Patient is concerned that her over all daily activites are limiting. Patient has tried PT and everything MD has previously recommended.      Depression       Depression Followup    How are you doing with your depression since your last visit? Worsened     Are you having other symptoms that might be associated with depression? Yes:  weepy and anxious     Have you had a significant life event?  Relationship Concerns and Job Concerns     Are " you feeling anxious or having panic attacks?   No    Do you have any concerns with your use of alcohol or other drugs? No    Social History     Tobacco Use     Smoking status: Never Smoker     Smokeless tobacco: Never Used   Substance Use Topics     Alcohol use: Yes     Frequency: Never     Comment: rare     Drug use: No     PHQ 4/15/2019 1/10/2020 4/20/2020   PHQ-9 Total Score 5 2 0   Q9: Thoughts of better off dead/self-harm past 2 weeks Not at all Not at all Not at all     RON-7 SCORE 5/3/2017 5/23/2018 10/15/2018   Total Score - - -   Total Score 5 6 14     Last PHQ-9 7/29/2020   1.  Little interest or pleasure in doing things 0   2.  Feeling down, depressed, or hopeless 2   3.  Trouble falling or staying asleep, or sleeping too much 0   4.  Feeling tired or having little energy 2   5.  Poor appetite or overeating 3   6.  Feeling bad about yourself 3   7.  Trouble concentrating 0   8.  Moving slowly or restless 1   Q9: Thoughts of better off dead/self-harm past 2 weeks 0   PHQ-9 Total Score 11   Difficulty at work, home, or with people Very difficult     RON-7  7/29/2020   1. Feeling nervous, anxious, or on edge 2   2. Not being able to stop or control worrying 0   3. Worrying too much about different things 3   4. Trouble relaxing 3   5. Being so restless that it is hard to sit still 2   6. Becoming easily annoyed or irritable 0   7. Feeling afraid, as if something awful might happen 0   RON-7 Total Score 10   If you checked any problems, how difficult have they made it for you to do your work, take care of things at home, or get along with other people? Very difficult         Suicide Assessment Five-step Evaluation and Treatment (SAFE-T)      How many servings of fruits and vegetables do you eat daily?  0-1    On average, how many sweetened beverages do you drink each day (Examples: soda, juice, sweet tea, etc.  Do NOT count diet or artificially sweetened beverages)?   0    How many days per week do you  "exercise enough to make your heart beat faster? 3 or less    How many minutes a day do you exercise enough to make your heart beat faster? 9 or less  How many days per week do you miss taking your medication? 3 days for depression    What makes it hard for you to take your medications?  remembering to take and not having enough time     body is hurting more.  Left hip, all over body aches, forearms, back, etc.  \"all over body aches\".   Wants to avoid getting out of bed in the morning because of pain.    Doesn't recall ever having a rheumatologic work up or evaluation.  She came to me several years ago on 120 norco/month and we weaned down to 60 every 1-2 months.   Now she feels she needs more for pain control.  In January I referred her to pain clinic, they called her and she said she'd call back and reschedule which she then forgot about.    I think it's reasonable to get some lab tests done to see if this could be inflammatory arthritis/RA, etc.     Renal function was decreased in January, we had recommended a 3 month follow up and she hasn't completed that, so we'll do that along with her labs.      Reviewed and updated as needed this visit by Provider         Review of Systems   Constitutional, HEENT, cardiovascular, pulmonary, gi and gu systems are negative, except as otherwise noted.       Objective   Reported vitals:  There were no vitals taken for this visit.   healthy, alert and no distress  PSYCH: Alert and oriented times 3; coherent speech, normal   rate and volume, able to articulate logical thoughts, able   to abstract reason, no tangential thoughts, no hallucinations   or delusions  Her affect is normal  RESP: No cough, no audible wheezing, able to talk in full sentences  Remainder of exam unable to be completed due to telephone visits    Diagnostic Test Results:  Labs reviewed in Epic        Assessment/Plan:    1. Generalized anxiety disorder     - FLUoxetine (PROZAC) 40 MG capsule; Take 1 capsule (40 " "mg) by mouth daily  Dispense: 90 capsule; Refill: 1    2. Decreased GFR   needs follow up lab  - **Basic metabolic panel FUTURE anytime; Future    3. Arthropathy  As above, we need to do further evaluation, I did tell her that just increasing her narcotics for \"all over pain\" is not recommended.  She specifically questioned fibromyalgia - we discussed briefly and that narcotics are found to make pain worse over time for fibromyalgia  If labs are unremarkable and the pain is still severe, we may need her to see pain clinic  - FLUoxetine (PROZAC) 40 MG capsule; Take 1 capsule (40 mg) by mouth daily  Dispense: 90 capsule; Refill: 1  - Rheumatoid factor; Future  - Anti Nuclear Bernadette IgG by IFA with Reflex; Future  - Cyclic Citrullinated Peptide Antibody IgG; Future  - ESR: Erythrocyte sedimentation rate; Future  - CRP, inflammation; Future  - **Basic metabolic panel FUTURE anytime; Future    No follow-ups on file.      Phone call duration:  8 minutes    Heather Mckenzie MD        "

## 2020-07-29 NOTE — PATIENT INSTRUCTIONS
Our Clinic hours are:  Mondays    7:20 am - 7 pm  Tues -  Fri  7:20 am - 5 pm    Clinic Phone: [phone]    The clinic lab opens at 7:30 am Mon - Fri and appointments are required.    [facility]. [phone]  Monday  8 am - 7pm  Tues - Fri 8 am - 5:30 pm

## 2020-07-30 ASSESSMENT — ANXIETY QUESTIONNAIRES: GAD7 TOTAL SCORE: 10

## 2020-08-10 DIAGNOSIS — M12.9 ARTHROPATHY: ICD-10-CM

## 2020-08-10 DIAGNOSIS — R94.4 DECREASED GFR: ICD-10-CM

## 2020-08-10 LAB
ANION GAP SERPL CALCULATED.3IONS-SCNC: 5 MMOL/L (ref 3–14)
BUN SERPL-MCNC: 24 MG/DL (ref 7–30)
CALCIUM SERPL-MCNC: 8.8 MG/DL (ref 8.5–10.1)
CHLORIDE SERPL-SCNC: 104 MMOL/L (ref 94–109)
CO2 SERPL-SCNC: 30 MMOL/L (ref 20–32)
CREAT SERPL-MCNC: 1.01 MG/DL (ref 0.52–1.04)
CRP SERPL-MCNC: 4.2 MG/L (ref 0–8)
ERYTHROCYTE [SEDIMENTATION RATE] IN BLOOD BY WESTERGREN METHOD: 9 MM/H (ref 0–30)
GFR SERPL CREATININE-BSD FRML MDRD: 61 ML/MIN/{1.73_M2}
GLUCOSE SERPL-MCNC: 106 MG/DL (ref 70–99)
POTASSIUM SERPL-SCNC: 3.6 MMOL/L (ref 3.4–5.3)
SODIUM SERPL-SCNC: 139 MMOL/L (ref 133–144)

## 2020-08-10 PROCEDURE — 80048 BASIC METABOLIC PNL TOTAL CA: CPT | Performed by: FAMILY MEDICINE

## 2020-08-10 PROCEDURE — 85652 RBC SED RATE AUTOMATED: CPT | Performed by: FAMILY MEDICINE

## 2020-08-10 PROCEDURE — 86140 C-REACTIVE PROTEIN: CPT | Performed by: FAMILY MEDICINE

## 2020-08-10 PROCEDURE — 86431 RHEUMATOID FACTOR QUANT: CPT | Performed by: FAMILY MEDICINE

## 2020-08-10 PROCEDURE — 86200 CCP ANTIBODY: CPT | Performed by: FAMILY MEDICINE

## 2020-08-10 PROCEDURE — 36415 COLL VENOUS BLD VENIPUNCTURE: CPT | Performed by: FAMILY MEDICINE

## 2020-08-10 PROCEDURE — 86038 ANTINUCLEAR ANTIBODIES: CPT | Performed by: FAMILY MEDICINE

## 2020-08-10 NOTE — LETTER
August 12, 2020      China Mckenzie  [address] [place] [address]        Dear ,    We are writing to inform you of your test results.    Labs are normal, no evidence of inflammation or rheumatologic disease on initial labs.     Recommend consultation with pain clinic regarding other options for pain treatment.     New referral sent.  Someone should be calling patient to schedule.     Kidney function improved from 7 months ago.     Resulted Orders   **Basic metabolic panel FUTURE anytime   Result Value Ref Range    Sodium 139 133 - 144 mmol/L    Potassium 3.6 3.4 - 5.3 mmol/L    Chloride 104 94 - 109 mmol/L    Carbon Dioxide 30 20 - 32 mmol/L    Anion Gap 5 3 - 14 mmol/L    Glucose 106 (H) 70 - 99 mg/dL      Comment:      Non Fasting    Urea Nitrogen 24 7 - 30 mg/dL    Creatinine 1.01 0.52 - 1.04 mg/dL    GFR Estimate 61 >60 mL/min/[1.73_m2]      Comment:      Non  GFR Calc  Starting 12/18/2018, serum creatinine based estimated GFR (eGFR) will be   calculated using the Chronic Kidney Disease Epidemiology Collaboration   (CKD-EPI) equation.      GFR Estimate If Black 71 >60 mL/min/[1.73_m2]      Comment:       GFR Calc  Starting 12/18/2018, serum creatinine based estimated GFR (eGFR) will be   calculated using the Chronic Kidney Disease Epidemiology Collaboration   (CKD-EPI) equation.      Calcium 8.8 8.5 - 10.1 mg/dL   CRP, inflammation   Result Value Ref Range    CRP Inflammation 4.2 0.0 - 8.0 mg/L   ESR: Erythrocyte sedimentation rate   Result Value Ref Range    Sed Rate 9 0 - 30 mm/h   Cyclic Citrullinated Peptide Antibody IgG   Result Value Ref Range    Cyclic Citrullinated Peptide Antibody, IgG 2 <7 U/mL      Comment:      Negative   Anti Nuclear Bernadette IgG by IFA with Reflex   Result Value Ref Range    TORI interpretation Negative NEG^Negative      Comment:                                         Reference range:  <1:40  NEGATIVE  1:40 - 1:80  BORDERLINE  POSITIVE  >1:80 POSITIVE     Rheumatoid factor   Result Value Ref Range    Rheumatoid Factor <7 <12 IU/mL       If you have any questions or concerns, please call the clinic at the number listed above.       Sincerely,        Dr. Mckenzie's Care Team/[facility]

## 2020-08-11 LAB
ANA SER QL IF: NEGATIVE
CCP AB SER IA-ACNC: 2 U/ML
RHEUMATOID FACT SER NEPH-ACNC: <7 IU/ML (ref 0–20)

## 2020-08-12 ENCOUNTER — TELEPHONE (OUTPATIENT)
Dept: PALLIATIVE MEDICINE | Facility: CLINIC | Age: 60
End: 2020-08-12

## 2020-08-12 NOTE — TELEPHONE ENCOUNTER
Pt will call back to schedule New Evalutation; fibromyalgia.    Kesha WALTERS    [facility]

## 2020-08-12 NOTE — RESULT ENCOUNTER NOTE
Labs are normal, no evidence of inflammation or rheumatologic disease on initial labs.    Recommend consultation with pain clinic regarding other options for pain treatment.     New referral sent.  Someone should be calling patient to schedule.    Kidney function improved from 7 months ago.    Heather Mckenzie M.D.

## 2020-08-12 NOTE — LETTER
August 31, 2020    China Mckenzie  [address] [place] [address]    Dear China,                                                                 Welcome to the [facility].  We are located on the 2nd floor ([address]) of the [facility], located at [address] [place] [address] [place] [address].    Your appointment at the [facility] has been scheduled on September 17th at 3:30 PM with Debra Zavaleta NP.    At your first visit, you will meet your team of caregivers who will help you to develop pain management strategies that will last a lifetime. You will meet with our support staff to review your insurance information, and collect your co-payment if required by your insurance company. You will also meet with a medical pain specialist and care coordinator who will assess your pain and develop a plan of care for your successful pain rehabilitation. You should expect to spend approximately 1 hour at your first visit with us. Usually, patients work with us for a period of 6-12 months, and eventually return to their primary doctor once their pain management has stabilized.      To help us make your visit go as smoothly as possible, please mail back the following items with you on your visit:       Completed Pain Questionnaire enclosed in this packet.  If you do not mail back the completed questionnaire, we may have to reschedule your appointment.      Due to the demand for new patient evaluations, you must notify the scheduling department 48 hours (2 days) in advance if you are not able to keep this appointment. Failure to do so could affect your ability to reschedule with our clinic. Please be aware that we will not prescribe any medications at your first visit.     Please call [phone] with any questions regarding your appointment. We look forward to meeting you and working to address your health care needs.     Sincerely,      [facility] [place]

## 2020-08-31 NOTE — TELEPHONE ENCOUNTER

## 2020-09-05 DIAGNOSIS — G43.009 MIGRAINE WITHOUT AURA AND WITHOUT STATUS MIGRAINOSUS, NOT INTRACTABLE: ICD-10-CM

## 2020-09-08 NOTE — TELEPHONE ENCOUNTER
"Requested Prescriptions   Pending Prescriptions Disp Refills     SUMAtriptan (IMITREX) 100 MG tablet [Pharmacy Med Name: sumatriptan 100 mg tablet] 8 tablet 11     Sig: TAKE 1 TAB AT ONSET OF MIGRAINE MAY REPEAT AFTER 2 HOURS (MAX 200MG PER 24 HOURS)       Serotonin Agonists Failed - 9/5/2020  8:01 AM        Failed - Serotonin Agonist request needs review.     Please review patient's record. If patient has had 8 or more treatments in the past month, please forward to provider.          Passed - Blood pressure under 140/90 in past 12 months     BP Readings from Last 3 Encounters:   02/19/20 118/66   01/10/20 112/62   09/06/19 118/64                 Passed - Recent (12 mo) or future (30 days) visit within the authorizing provider's specialty     Patient has had an office visit with the authorizing provider or a provider within the authorizing providers department within the previous 12 mos or has a future within next 30 days. See \"Patient Info\" tab in inbasket, or \"Choose Columns\" in Meds & Orders section of the refill encounter.              Passed - Medication is active on med list        Passed - Patient is age 18 or older        Passed - No active pregnancy on record        Passed - No positive pregnancy test in past 12 months             "

## 2020-09-09 RX ORDER — SUMATRIPTAN 100 MG/1
TABLET, FILM COATED ORAL
Qty: 8 TABLET | Refills: 11 | Status: SHIPPED | OUTPATIENT
Start: 2020-09-09 | End: 2021-05-17

## 2020-09-30 DIAGNOSIS — G89.4 CHRONIC PAIN SYNDROME: ICD-10-CM

## 2020-09-30 DIAGNOSIS — M12.9 ARTHROPATHY: ICD-10-CM

## 2020-10-02 RX ORDER — HYDROCODONE BITARTRATE AND ACETAMINOPHEN 5; 325 MG/1; MG/1
TABLET ORAL
Qty: 60 TABLET | Refills: 0 | Status: SHIPPED | OUTPATIENT
Start: 2020-10-02 | End: 2020-10-29

## 2020-10-02 NOTE — TELEPHONE ENCOUNTER
Norco      Last Written Prescription Date:  8/31/20  Last Fill Quantity: 60,   # refills: 0  Last Office Visit: 7/29/20 virtual visit.   Future Office visit:       Routing refill request to provider for review/approval because:  Drug not on the [facility], [facility] or [facility] refill protocol or controlled substance

## 2020-10-07 ENCOUNTER — VIRTUAL VISIT (OUTPATIENT)
Dept: PALLIATIVE MEDICINE | Facility: CLINIC | Age: 60
End: 2020-10-07
Attending: FAMILY MEDICINE

## 2020-10-07 DIAGNOSIS — Z62.810 PERSONAL HISTORY OF PHYSICAL AND SEXUAL ABUSE IN CHILDHOOD: ICD-10-CM

## 2020-10-07 DIAGNOSIS — G89.4 CHRONIC PAIN SYNDROME: Primary | ICD-10-CM

## 2020-10-07 DIAGNOSIS — M79.7 FIBROMYALGIA: ICD-10-CM

## 2020-10-07 PROCEDURE — 99207 PR NO CHARGE LOS: CPT | Performed by: NURSE PRACTITIONER

## 2020-10-07 ASSESSMENT — PAIN SCALES - GENERAL: PAINLEVEL: MILD PAIN (3)
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
## 2020-10-19 NOTE — PATIENT INSTRUCTIONS
After Visit Instructions:     Thank you for coming to [facility] for your care. It is my goal to partner with you to help you reach your optimal state of health.     Schedule follow-up to complete new patient assessment.      LARISA Sanchez, NP-C  [facility]    Clinic Number:  [phone]     Call with any questions about your care and for scheduling assistance.     Calls are returned Monday through Friday between 8 AM and 4:30 PM. We usually get back to you within 2 business days depending on the issue/request.    If we are prescribing your medications:    For opioid medication refills, call the clinic or send a [employer] message 7 days in advance.  Please include:    Name of requested medication    Name of the pharmacy.    For non-opioid medications, call your pharmacy directly to request a refill. Please allow 3-4 days to be processed.     Per [place] State Law:    All controlled substance prescriptions must be filled within 30 days of being written.      For those controlled substances allowing refills, pickup must occur within 30 days of last fill.      We believe regular attendance is key to your success in our program!      Any time you are unable to keep your appointment we ask that you call us at least 24 hours in advance to cancel.This will allow us to offer the appointment time to another patient.   Multiple missed appointments may lead to dismissal from the clinic.

## 2020-11-12 ENCOUNTER — TELEPHONE (OUTPATIENT)
Dept: FAMILY MEDICINE | Facility: CLINIC | Age: 60
End: 2020-11-12

## 2020-11-12 NOTE — TELEPHONE ENCOUNTER
Reason for call:  Patient reporting a symptom    Symptom or request: Pt has had left ear pain today. No other symptoms.  Does NOT want an appt or to got to  and wants home care advice.    Please call patient and advise.      Duration (how long have symptoms been present): today    Have you been treated for this before? Yes    Additional comments:     Phone Number patient can be reached at:  Home number on file [phone] (home)    Best Time:  any    Can we leave a detailed message on this number:  YES    Call taken on 11/12/2020 at 4:17 PM by Gloria Johnson

## 2020-11-12 NOTE — TELEPHONE ENCOUNTER
Pt will take Tylenol and warm compress to ear.  If pt gets fever, severe pain, ear drainage, other symptoms, Pt should f/u with clinic. [facility]

## 2020-11-27 ENCOUNTER — VIRTUAL VISIT (OUTPATIENT)
Dept: PALLIATIVE MEDICINE | Facility: CLINIC | Age: 60
End: 2020-11-27

## 2020-11-27 DIAGNOSIS — Z62.810 PERSONAL HISTORY OF PHYSICAL AND SEXUAL ABUSE IN CHILDHOOD: ICD-10-CM

## 2020-11-27 DIAGNOSIS — M79.602 CHRONIC PAIN OF LEFT UPPER EXTREMITY: ICD-10-CM

## 2020-11-27 DIAGNOSIS — G89.29 CHRONIC PAIN OF BOTH LOWER EXTREMITIES: ICD-10-CM

## 2020-11-27 DIAGNOSIS — M79.604 CHRONIC PAIN OF BOTH LOWER EXTREMITIES: ICD-10-CM

## 2020-11-27 DIAGNOSIS — M79.2 NEUROPATHIC PAIN: ICD-10-CM

## 2020-11-27 DIAGNOSIS — M79.605 CHRONIC PAIN OF BOTH LOWER EXTREMITIES: ICD-10-CM

## 2020-11-27 DIAGNOSIS — M79.7 FIBROMYALGIA: Primary | ICD-10-CM

## 2020-11-27 DIAGNOSIS — G89.29 CHRONIC PAIN OF LEFT UPPER EXTREMITY: ICD-10-CM

## 2020-11-27 DIAGNOSIS — G43.009 MIGRAINE WITHOUT AURA AND WITHOUT STATUS MIGRAINOSUS, NOT INTRACTABLE: ICD-10-CM

## 2020-11-27 PROCEDURE — 99214 OFFICE O/P EST MOD 30 MIN: CPT | Mod: 95 | Performed by: NURSE PRACTITIONER

## 2020-11-27 RX ORDER — TOPIRAMATE 25 MG/1
TABLET, FILM COATED ORAL
Qty: 60 TABLET | Refills: 0 | Status: SHIPPED | OUTPATIENT
Start: 2020-11-27 | End: 2021-05-10

## 2020-11-27 ASSESSMENT — PAIN SCALES - GENERAL: PAINLEVEL: MODERATE PAIN (5)
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
## 2020-12-21 ENCOUNTER — TELEPHONE (OUTPATIENT)
Dept: FAMILY MEDICINE | Facility: CLINIC | Age: 60
End: 2020-12-21

## 2020-12-21 DIAGNOSIS — G89.4 CHRONIC PAIN SYNDROME: ICD-10-CM

## 2020-12-21 DIAGNOSIS — M12.9 ARTHROPATHY: ICD-10-CM

## 2020-12-21 RX ORDER — HYDROCODONE BITARTRATE AND ACETAMINOPHEN 5; 325 MG/1; MG/1
TABLET ORAL
Qty: 60 TABLET | Refills: 0 | Status: SHIPPED | OUTPATIENT
Start: 2020-12-29 | End: 2021-02-08

## 2020-12-21 NOTE — TELEPHONE ENCOUNTER
Last refilled 11/30/2020 #60.  Pain contract states 60/month.     Refill done with date of 12/29/2020.      Also, due for apt before next refill.  Per narcotic contract, apt due every 3 months, I have not seen her since 7/29/2020.    No further refills without apt.    Heather Mckenzie M.D.

## 2020-12-21 NOTE — TELEPHONE ENCOUNTER
Routing refill request to provider for review/approval because:  Drug not on the [facility] refill protocol     Bonnie Paredes RN

## 2021-02-08 ENCOUNTER — OFFICE VISIT (OUTPATIENT)
Dept: FAMILY MEDICINE | Facility: CLINIC | Age: 61
End: 2021-02-08
Payer: COMMERCIAL

## 2021-02-08 VITALS
SYSTOLIC BLOOD PRESSURE: 110 MMHG | WEIGHT: 94 LBS | HEIGHT: 55 IN | TEMPERATURE: 97.7 F | DIASTOLIC BLOOD PRESSURE: 66 MMHG | RESPIRATION RATE: 16 BRPM | OXYGEN SATURATION: 96 % | BODY MASS INDEX: 21.76 KG/M2 | HEART RATE: 74 BPM

## 2021-02-08 DIAGNOSIS — M12.9 ARTHROPATHY: ICD-10-CM

## 2021-02-08 DIAGNOSIS — F41.1 GENERALIZED ANXIETY DISORDER: ICD-10-CM

## 2021-02-08 DIAGNOSIS — G89.4 CHRONIC PAIN SYNDROME: ICD-10-CM

## 2021-02-08 DIAGNOSIS — M79.2 NEUROPATHIC PAIN: ICD-10-CM

## 2021-02-08 DIAGNOSIS — G43.009 MIGRAINE WITHOUT AURA AND WITHOUT STATUS MIGRAINOSUS, NOT INTRACTABLE: ICD-10-CM

## 2021-02-08 PROCEDURE — 99214 OFFICE O/P EST MOD 30 MIN: CPT | Performed by: FAMILY MEDICINE

## 2021-02-08 RX ORDER — TOPIRAMATE 25 MG/1
TABLET, FILM COATED ORAL
Qty: 60 TABLET | Refills: 0 | Status: CANCELLED | OUTPATIENT
Start: 2021-02-08

## 2021-02-08 RX ORDER — FLUOXETINE 40 MG/1
40 CAPSULE ORAL DAILY
Qty: 90 CAPSULE | Refills: 1 | Status: SHIPPED | OUTPATIENT
Start: 2021-02-08 | End: 2021-05-10

## 2021-02-08 RX ORDER — HYDROCODONE BITARTRATE AND ACETAMINOPHEN 5; 325 MG/1; MG/1
TABLET ORAL
Qty: 60 TABLET | Refills: 0 | Status: SHIPPED | OUTPATIENT
Start: 2021-02-08 | End: 2021-03-29

## 2021-02-08 ASSESSMENT — ANXIETY QUESTIONNAIRES
2. NOT BEING ABLE TO STOP OR CONTROL WORRYING: SEVERAL DAYS
3. WORRYING TOO MUCH ABOUT DIFFERENT THINGS: SEVERAL DAYS
1. FEELING NERVOUS, ANXIOUS, OR ON EDGE: SEVERAL DAYS
GAD7 TOTAL SCORE: 7
7. FEELING AFRAID AS IF SOMETHING AWFUL MIGHT HAPPEN: SEVERAL DAYS
6. BECOMING EASILY ANNOYED OR IRRITABLE: NOT AT ALL
5. BEING SO RESTLESS THAT IT IS HARD TO SIT STILL: SEVERAL DAYS

## 2021-02-08 ASSESSMENT — MIFFLIN-ST. JEOR: SCORE: 848.51

## 2021-02-08 ASSESSMENT — PATIENT HEALTH QUESTIONNAIRE - PHQ9: 5. POOR APPETITE OR OVEREATING: MORE THAN HALF THE DAYS

## 2021-02-08 NOTE — PATIENT INSTRUCTIONS
Follow up with pain clinic regarding Topiramate        COVID Vaccine:  --I strongly encourage you to get a COVID vaccine when it is available to you.    --I have received the vaccine and will vaccinate my family  --The COVID vaccine is safe.  Most serious adverse reactions happen within the first few days or weeks.  Your chance of having a serious complication related to COVID is much higher than having a serious adverse reaction to the vaccine.  --Many people will have low grade fevers, general body aches and fatigue for 1-2 days after getting the vaccine.  This is a sign that your immune system is activated - this is a good thing!  --If you have an allergy to Miralax, you should not get the vaccine.    --Stopping a pandemic requires all of us to do our part. Getting vaccinated is another step you can take to help reduce your chance of being exposed to the virus and spreading it to others. Together, the COVID-19 vaccination and following CDC's recommendations to protect yourself and others will offer the best protection from COVID-19. Wear a mask, wash your hands, stay at least 6 feet from others, and remain home if you're sick.  --If you encounter information about the vaccine that you would like further clarification on, please reach out to my team!  Send me a My Chart message or make an appointment with me.    -visit [email or web address]  for up to date information on vaccine availability.    Schedule on [employer] or The direct phone # for vaccine scheduling is: [phone]    Or    -[facility]  [email or web address]/[email or web address]   Or call [phone]

## 2021-02-08 NOTE — PROGRESS NOTES
Assessment & Plan     Generalized anxiety disorder   not taking fluoxetine regularly - last filled x 90 days in august  Recommend regular use of this  - FLUoxetine (PROZAC) 40 MG capsule; Take 1 capsule (40 mg) by mouth daily    Arthropathy        - HYDROcodone-acetaminophen (NORCO) 5-325 MG tablet; TAKE 1 TABLET BY MOUTH TWICE DAILY AS NEEDED FOR PAIN    Chronic pain syndrome  Declined a increase in her pain medication as she requested  She needs to follow up with pain clinic  - HYDROcodone-acetaminophen (NORCO) 5-325 MG tablet; TAKE 1 TABLET BY MOUTH TWICE DAILY AS NEEDED FOR PAIN    Neuropathic pain   Topamax prescribed by pain clinic, she has failed to follow up with them due to cost.         Return in about 3 months (around 5/8/2021) for pain medication refill.    Heather Mckenzie MD  [facility]     China is a 60 year old who presents to clinic today for the following health issues  accompanied by her self :    HPI       Chronic Pain Follow-Up    Where in your body do you have pain? All over body   How has your pain affected your ability to work? Still working full time   Which of these pain treatments have you tried since your last clinic visit? Heat and Rest  How well are you sleeping? Good  How has your mood been since your last visit? Better  Have you had a significant life event? Grief or Loss  Other aggravating factors: none  Taking medication as directed? Yes    PHQ-9 SCORE 1/10/2020 4/20/2020 7/29/2020   PHQ-9 Total Score - - -   PHQ-9 Total Score 2 0 11     RON-7 SCORE 5/23/2018 10/15/2018 7/29/2020   Total Score - - -   Total Score 6 14 10     No flowsheet data found.  Encounter-Level CSA:    There are no encounter-level csa.     Patient-Level CSA:    There are no patient-level csa.         How many servings of fruits and vegetables do you eat daily?  2-3    On average, how many sweetened beverages do you drink each day (Examples: soda, juice, sweet tea,  "etc.  Do NOT count diet or artificially sweetened beverages)?   0    How many days per week do you exercise enough to make your heart beat faster? 4    How many minutes a day do you exercise enough to make your heart beat faster? 20 - 29  How many days per week do you miss taking your medication? 1    What makes it hard for you to take your medications?  remembering to take    Medication Followup of medication listed in      Taking Medication as prescribed: yes    Side Effects:  None    Medication Helping Symptoms:  yes     Body mass index is 21.85 kg/m .\      Fluoxetine last dispensed 2020 - #90  Patient tells me she's taking this every day.       Her dog, ,  2021.  Passive suicidal thoughts at times since the death of her dog.   No intent to harm self and no plan.      Review of Systems   Constitutional, HEENT, cardiovascular, pulmonary, gi and gu systems are negative, except as otherwise noted.      Objective    /66   Pulse 74   Temp 97.7  F (36.5  C) (Tympanic)   Resp 16   Ht 1.397 m (4' 7\")   Wt 42.6 kg (94 lb)   SpO2 96%   BMI 21.85 kg/m    Body mass index is 21.85 kg/m .  Physical Exam   GENERAL APPEARANCE: healthy, alert and no distress  PSYCH: mentation appears normal and affect normal/bright                "

## 2021-02-09 ASSESSMENT — ANXIETY QUESTIONNAIRES: GAD7 TOTAL SCORE: 7

## 2021-05-10 ENCOUNTER — OFFICE VISIT (OUTPATIENT)
Dept: FAMILY MEDICINE | Facility: CLINIC | Age: 61
End: 2021-05-10
Payer: COMMERCIAL

## 2021-05-10 ENCOUNTER — TELEPHONE (OUTPATIENT)
Dept: FAMILY MEDICINE | Facility: CLINIC | Age: 61
End: 2021-05-10

## 2021-05-10 VITALS
HEART RATE: 59 BPM | DIASTOLIC BLOOD PRESSURE: 70 MMHG | RESPIRATION RATE: 16 BRPM | BODY MASS INDEX: 24.07 KG/M2 | SYSTOLIC BLOOD PRESSURE: 122 MMHG | TEMPERATURE: 97.8 F | HEIGHT: 55 IN | WEIGHT: 104 LBS | OXYGEN SATURATION: 100 %

## 2021-05-10 DIAGNOSIS — M79.2 NEUROPATHIC PAIN: ICD-10-CM

## 2021-05-10 DIAGNOSIS — M12.9 ARTHROPATHY: ICD-10-CM

## 2021-05-10 DIAGNOSIS — Z79.899 ENCOUNTER FOR LONG-TERM CURRENT USE OF MEDICATION: Primary | ICD-10-CM

## 2021-05-10 DIAGNOSIS — F41.1 GENERALIZED ANXIETY DISORDER: ICD-10-CM

## 2021-05-10 DIAGNOSIS — G43.009 MIGRAINE WITHOUT AURA AND WITHOUT STATUS MIGRAINOSUS, NOT INTRACTABLE: ICD-10-CM

## 2021-05-10 DIAGNOSIS — G89.4 CHRONIC PAIN SYNDROME: ICD-10-CM

## 2021-05-10 PROCEDURE — 99214 OFFICE O/P EST MOD 30 MIN: CPT | Performed by: FAMILY MEDICINE

## 2021-05-10 PROCEDURE — 80306 DRUG TEST PRSMV INSTRMNT: CPT | Performed by: FAMILY MEDICINE

## 2021-05-10 RX ORDER — FLUOXETINE 40 MG/1
40 CAPSULE ORAL DAILY
Qty: 90 CAPSULE | Refills: 1 | Status: SHIPPED | OUTPATIENT
Start: 2021-05-10 | End: 2022-04-25

## 2021-05-10 RX ORDER — HYDROCODONE BITARTRATE AND ACETAMINOPHEN 5; 325 MG/1; MG/1
TABLET ORAL
Qty: 60 TABLET | Refills: 0 | Status: SHIPPED | OUTPATIENT
Start: 2021-05-28 | End: 2021-06-22

## 2021-05-10 RX ORDER — TOPIRAMATE 25 MG/1
TABLET, FILM COATED ORAL
Qty: 60 TABLET | Refills: 0 | Status: SHIPPED | OUTPATIENT
Start: 2021-05-10 | End: 2021-06-22

## 2021-05-10 ASSESSMENT — MIFFLIN-ST. JEOR: SCORE: 893.87

## 2021-05-10 ASSESSMENT — PAIN SCALES - GENERAL: PAINLEVEL: NO PAIN (0)

## 2021-05-10 ASSESSMENT — PATIENT HEALTH QUESTIONNAIRE - PHQ9: SUM OF ALL RESPONSES TO PHQ QUESTIONS 1-9: 0

## 2021-05-10 NOTE — TELEPHONE ENCOUNTER
Would need a vaginal exam to rule out other problems.  Typically topical estrogen is used. I have never used viscous lidocaine this way.  May want to see GYN for their recommendations.    Heather Mckenzie M.D.

## 2021-05-10 NOTE — LETTER
Opioid / Opioid Plus Controlled Substance Agreement    This is an agreement between you and your provider about the safe and appropriate use of controlled substance/opioids prescribed by your care team. Controlled substances are medicines that can cause physical and mental dependence (abuse).    There are strict laws about having and using these medicines. We here at [facility] are committing to working with you in your efforts to get better. To support you in this work, we ll help you schedule regular office appointments for medicine refills. If we must cancel or change your appointment for any reason, we ll make sure you have enough medicine to last until your next appointment.     As a Provider, I will:    Listen carefully to your concerns and treat you with respect.     Recommend a treatment plan that I believe is in your best interest. This plan may involve therapies other than opioid pain medication.     Talk with you often about the possible benefits, and the risk of harm of any medicine that we prescribe for you.     Provide a plan on how to taper (discontinue or go off) using this medicine if the decision is made to stop its use.    As a Patient, I understand that opioid(s):     Are a controlled substance prescribed by my care team to help me function or work and manage my condition(s).     Are strong medicines and can cause serious side effects such as:    Drowsiness, which can seriously affect my driving ability    A lower breathing rate, enough to cause death    Harm to my thinking ability     Depression     Abuse of and addiction to this medicine    Need to be taken exactly as prescribed. Combining opioids with certain medicines or chemicals (such as illegal drugs, sedatives, sleeping pills, and benzodiazepines) can be dangerous or even fatal. If I stop opioids suddenly, I may have severe withdrawal symptoms.    Do not work for all types of pain nor for all patients. If they re not helpful, I may  be asked to stop them.        The risks, benefits and side effects of these medicine(s) were explained to me. I agree that:  1. I will take part in other treatments as advised by my care team. This may be psychiatry or counseling, physical therapy, behavioral therapy, group treatment or a referral to a specialist.     2. I will keep all my appointments. I understand that this is part of the monitoring of opioids. My care team may require an office visit for EVERY opioid/controlled substance refill. If I miss appointments or don t follow instructions, my care team may stop my medicine.    3. I will take my medicines as prescribed. I will not change the dose or schedule unless my care team tells me to. There will be no refills if I run out early.     4. I may be asked to come to the clinic and complete a urine drug test or complete a pill count at any time. If I don t give a urine sample or participate in a pill count, the care team may stop my medicine.    5. I will only receive prescriptions from this clinic for chronic pain. If I am treated by another provider for acute pain issues, I will tell them that I am taking opioid pain medication for chronic pain and that I have a treatment agreement with this provider. I will inform my [facility] care team within one business day if I am given a prescription for any pain medication by another healthcare provider. My [facility] care team can contact other providers and pharmacists about my use of any medicines.    6. It is up to me to make sure that I don t run out of my medicines on weekends or holidays. If my care team is willing to refill my opioid prescription without a visit, I must request refills only during office hours. Refills may take up to 3 business days to process. I will use one pharmacy to fill all my opioid and other controlled substance prescriptions. I will notify the clinic about any changes to my insurance or medication  availability.    7. I am responsible for my prescriptions. If the medicine/prescription is lost, stolen or destroyed, it will not be replaced. I also agree not to share controlled substance medicines with anyone.    8. I am aware I should not use any illegal or recreational drugs. I agree not to drink alcohol unless my care team says I can.       9. If I enroll in the [place] Medical Cannabis program, I will tell my care team prior to my next refill.     10. I will tell my care team right away if I become pregnant, have a new medical problem treated outside of my regular clinic, or have a change in my medications.    11. I understand that this medicine can affect my thinking, judgment and reaction time. Alcohol and drugs affect the brain and body, which can affect the safety of my driving. Being under the influence of alcohol or drugs can affect my decision-making, behaviors, personal safety, and the safety of others. Driving while impaired (DWI) can occur if a person is driving, operating, or in physical control of a car, motorcycle, boat, snowmobile, ATV, motorbike, off-road vehicle, or any other motor vehicle ([place] Statute 169A.20). I understand the risk if I choose to drive or operate any vehicle or machinery.    I understand that if I do not follow any of the conditions above, my prescriptions or treatment may be stopped or changed.          Opioids  What You Need to Know    What are opioids?   Opioids are pain medicines that must be prescribed by a doctor. They are also known as narcotics.     Examples are:   1. morphine (MS Contin, Amy)  2. oxycodone (Oxycontin)  3. oxycodone and acetaminophen (Percocet)  4. hydrocodone and acetaminophen (Vicodin, Norco)   5. fentanyl patch (Duragesic)   6. hydromorphone (Dilaudid)   7. methadone  8. codeine (Tylenol #3)     What do opioids do well?   Opioids are best for severe short-term pain such as after a surgery or injury. They may work well for cancer pain. They may  help some people with long-lasting (chronic) pain.     What do opioids NOT do well?   Opioids never get rid of pain entirely, and they don t work well for most patients with chronic pain. Opioids don t reduce swelling, one of the causes of pain.                                    Other ways to manage chronic pain and improve function include:       Treat the health problem that may be causing pain    Anti-inflammation medicines, which reduce swelling and tenderness, such as ibuprofen (Advil, Motrin) or naproxen (Aleve)    Acetaminophen (Tylenol)    Antidepressants and anti-seizure medicines, especially for nerve pain    Topical treatments such as patches or creams    Injections or nerve blocks    Chiropractic or osteopathic treatment    Acupuncture, massage, deep breathing, meditation, visual imagery, aromatherapy    Use heat or ice at the pain site    Physical therapy     Exercise    Stop smoking    Take part in therapy       Risks and side effects     Talk to your doctor before you start or decide to keep taking opioids. Possible side effects include:      Lowering your breathing rate enough to cause death    Overdose, including death, especially if taking higher than prescribed doses    Worse depression symptoms; less pleasure in things you usually enjoy    Feeling tired or sluggish    Slower thoughts or cloudy thinking    Being more sensitive to pain over time; pain is harder to control    Trouble sleeping or restless sleep    Changes in hormone levels (for example, less testosterone)    Changes in sex drive or ability to have sex    Constipation    Unsafe driving    Itching and sweating    Dizziness    Nausea, throwing up and dry mouth    What else should I know about opioids?    Opioids may lead to dependence, tolerance, or addiction.      Dependence means that if you stop or reduce the medicine too quickly, you will have withdrawal symptoms. These include loose poop (diarrhea), jitters, flu-like symptoms,  nervousness and tremors. Dependence is not the same as addiction.                       Tolerance means needing higher doses over time to get the same effect. This may increase the chance of serious side effects.      Addiction is when people improperly use a substance that harms their body, their mind or their relations with others. Use of opiates can cause a relapse of addiction if you have a history of drug or alcohol abuse.      People who have used opioids for a long time may have a lower quality of life, worse depression, higher levels of pain and more visits to doctors.    You can overdose on opioids. Take these steps to lower your risk of overdose:    1. Recognize the signs:  Signs of overdose include decrease or loss of consciousness (blackout), slowed breathing, trouble waking up and blue lips. If someone is worried about overdose, they should call 911.    2. Talk to your doctor about Narcan (naloxone).   If you are at risk for overdose, you may be given a prescription for Narcan. This medicine very quickly reverses the effects of opioids.   If you overdose, a friend or family member can give you Narcan while waiting for the ambulance. They need to know the signs of overdose and how to give Narcan.     3. Don't use alcohol or street drugs.   Taking them with opioids can cause death.    4. Do not take any of these medicines unless your doctor says it s OK. Taking these with opioids can cause death:    Benzodiazepines, such as lorazepam (Ativan), alprazolam (Xanax) or diazepam (Valium)    Muscle relaxers, such as cyclobenzaprine (Flexeril)    Sleeping pills like zolpidem (Ambien)     Other opioids      How to keep you and other people safe while taking opioids:    1. Never share your opioids with others.  Opioid medicines are regulated by the Drug Enforcement Agency ([place]). Selling or sharing medications is a criminal act.    2. Be sure to store opioids in a secure place, locked up if possible. Young children  can easily swallow them and overdose.    3. When you are traveling with your medicines, keep them in the original bottles. If you use a pill box, be sure you also carry a copy of your medicine list from your clinic or pharmacy.    4. Safe disposal of opioids    Most pharmacies have places to get rid of medicine, called disposal kiosks. Medicine disposal options are also available in every [facility]. Search your county and  medication disposal  to find more options. You can find more details at:  [email or web address]     I agree that my provider, clinic care team, and pharmacy may work with any city, state or federal law enforcement agency that investigates the misuse, sale, or other diversion of my controlled medicine. I will allow my provider to discuss my care with, or share a copy of, this agreement with any other treating provider, pharmacy or emergency room where I receive care.    I have read this agreement and have asked questions about anything I did not understand.    _______________________________________________________  Patient Signature - China Mckenzie _____________________                   Date     _______________________________________________________  Provider Signature - Heather Mckenzie MD   _____________________                   Date     _______________________________________________________  Witness Signature (required if provider not present while patient signing)   _____________________                   Date

## 2021-05-10 NOTE — PATIENT INSTRUCTIONS
Start Topiramate 25 mg at bedtime for 1 week, then increase to twice daily     Follow up in 1 month, phone visit okay to recheck    Our Clinic hours are:  Mondays    7:20 am - 7 pm  Tues - Fri  7:20 am - 5 pm    Clinic Phone: [phone]    The clinic lab opens at 7:30 am Mon - Fri and appointments are required.    [facility]. [phone]  Monday  8 am - 7pm  Tues - Fri 8 am - 5:30 pm         Patient Education     Preventing Migraine Headaches: Triggers  The first step in preventing migraines is to learn what triggers them. You may then be able to control your triggers to avoid or reduce the severity of your migraines.   Know your triggers  Be aware that you may have more than one trigger, and that some triggers may work together. Common migraine triggers include:     Food and nutrition. Skipping meals or not drinking enough water can trigger headaches. So can certain foods, such as caffeine, chocolate, artificial sweeteners, monosodium glutamate (MSG), aged cheese, or sausage.    Alcohol. Red wine and other alcoholic beverages are common migraine triggers.    Chemicals. Scents, cleaning products, gasoline, glue, perfume, and paint can be triggers. So can tobacco smoke, including secondhand smoke.    Emotions. Stress can trigger headaches or make them worse once they start.    Sleep disruption. Staying up late, sleeping late, and traveling across time zones can disrupt your sleep cycle, triggering headaches.    Hormones. Many women notice that migraines tend to happen at a certain point in their menstrual cycle. Birth control pills or hormone replacement therapy may also trigger migraines.    Environment and weather. Air travel, changes in altitude, air pressure changes, hot sun, or bright or flashing lights can be triggers.    Medicine overuse. Frequent use of pain medicines for headache pain can also cause a headache. This may also be called rebound headache.  Control your  triggers  These are some of the things you can do to try to control triggers:    Avoid triggers if you can. For example, stay clear of alcohol and foods that trigger your headaches. Use unscented household products. Keep regular sleep habits. Manage stress to help control emotional triggers.    Change your behavior at times when triggers can't be avoided. For example, make sure to get enough rest and drink plenty of water while you're traveling. Make sure to carry a hat, sunglasses, and your medicines. Be alert for migraine symptoms, so you can treat a migraine early if it happens.  [employer] last reviewed this educational content on 5/1/2018 2000-2021 [employer]. All rights reserved. This information is not intended as a substitute for professional medical care. Always follow your healthcare professional's instructions.

## 2021-05-10 NOTE — TELEPHONE ENCOUNTER
Spoke with patient and she is willing to see OB/GYN to get another opinion.     Sona Mckenzie MA

## 2021-05-10 NOTE — PROGRESS NOTES
Assessment & Plan     Encounter for long-term current use of medication   CSA signed  UTox done today  - [id number] - Urine Drugs of Abuse Panel 13 - (Screening)    Arthropathy     - HYDROcodone-acetaminophen (NORCO) 5-325 MG tablet; DUE FOR VISIT TAKE 1 TABLET BY MOUTH TWICE DAILY AS NEEDED FOR PAIN  - FLUoxetine (PROZAC) 40 MG capsule; Take 1 capsule (40 mg) by mouth daily    Chronic pain syndrome  Max 2/day  CSA signed  - HYDROcodone-acetaminophen (NORCO) 5-325 MG tablet; DUE FOR VISIT TAKE 1 TABLET BY MOUTH TWICE DAILY AS NEEDED FOR PAIN    Generalized anxiety disorder  Patient has only filled 3 times in 10 months, clearly not taking regularly  - FLUoxetine (PROZAC) 40 MG capsule; Take 1 capsule (40 mg) by mouth daily    Neuropathic pain     - topiramate (TOPAMAX) 25 MG tablet; Take one tablet at HS for 7 days then increase to BID.    Migraine without aura and without status migrainosus, not intractable   follow up in 1 month via phone to recheck and further titrate if able  - topiramate (TOPAMAX) 25 MG tablet; Take one tablet at HS for 7 days then increase to BID.                 No follow-ups on file.    Heather Mckenzie MD  [facility]   China is a 60 year old who presents for the following health issues  accompanied by her self:    HPI     Migraine     Since your last clinic visit, how have your headaches changed?  Worsened    How often are you getting headaches or migraines? daily     Are you able to do normal daily activities when you have a migraine? She forces herself to do what is needed to get done    Are you taking rescue/relief medications? (Select all that apply) sumatriptan (Imitrex)    How helpful is your rescue/relief medication?  I get total relief when she takes 2 a day    Are you taking any medications to prevent migraines? (Select all that apply)  No    In the past 4 weeks, how often have you gone to urgent care or the emergency room because of  "your headaches?  0      How many servings of fruits and vegetables do you eat daily?  2-3    On average, how many sweetened beverages do you drink each day (Examples: soda, juice, sweet tea, etc.  Do NOT count diet or artificially sweetened beverages)?   0    How many days per week do you exercise enough to make your heart beat faster? 3 or less    How many minutes a day do you exercise enough to make your heart beat faster? 30 - 60    How many days per week do you miss taking your medication? 0    Amitriptyline caused severe dry mouth  Topamax prescribed in 10/2020 by pain clinic - only took for a month or two - didn't follow up with the pain clinic due to cost.   HR is on the low side, I'd probably avoid propranolol          Review of Systems   Constitutional, HEENT, cardiovascular, pulmonary, gi and gu systems are negative, except as otherwise noted.      Objective    /70   Pulse 59   Temp 97.8  F (36.6  C) (Tympanic)   Resp 16   Ht 1.397 m (4' 7\")   Wt 47.2 kg (104 lb)   SpO2 100%   Breastfeeding No   BMI 24.17 kg/m    Body mass index is 24.17 kg/m .  Physical Exam   GENERAL APPEARANCE: healthy, alert and no distress  NEURO: Normal strength and tone, mentation intact and speech normal                "

## 2021-05-10 NOTE — TELEPHONE ENCOUNTER
Reason for Call:  Other call back    Detailed comments: Patient forgot to mention at appt today that intercourse is painful for her, she is wondering about inserting lidocaine before intercourse to help with this, please advise.    Phone Number Patient can be reached at: Home number on file [phone] (home)    Best Time: any    Can we leave a detailed message on this number? YES    Call taken on 5/10/2021 at 4:36 PM by Sona Castillo

## 2021-05-11 LAB

## 2021-05-15 DIAGNOSIS — G43.009 MIGRAINE WITHOUT AURA AND WITHOUT STATUS MIGRAINOSUS, NOT INTRACTABLE: ICD-10-CM

## 2021-05-17 RX ORDER — SUMATRIPTAN 100 MG/1
TABLET, FILM COATED ORAL
Qty: 9 TABLET | Refills: 11 | Status: SHIPPED | OUTPATIENT
Start: 2021-05-17 | End: 2022-02-10

## 2021-05-17 NOTE — TELEPHONE ENCOUNTER
"Has appointment scheduled for 6/7/21      Requested Prescriptions   Pending Prescriptions Disp Refills     SUMAtriptan (IMITREX) 100 MG tablet [Pharmacy Med Name: sumatriptan 100 mg tablet] 9 tablet 11     Sig: TAKE 1 TAB AT ONSET OF MIGRAINE MAY REPEAT AFTER 2 HOURS (MAX 200MG PER 24 HOURS)       Serotonin Agonists Failed - 5/15/2021  8:00 AM        Failed - Serotonin Agonist request needs review.     Please review patient's record. If patient has had 8 or more treatments in the past month, please forward to provider.          Passed - Blood pressure under 140/90 in past 12 months     BP Readings from Last 3 Encounters:   05/10/21 122/70   02/08/21 110/66   02/19/20 118/66                 Passed - Recent (12 mo) or future (30 days) visit within the authorizing provider's specialty     Patient has had an office visit with the authorizing provider or a provider within the authorizing providers department within the previous 12 mos or has a future within next 30 days. See \"Patient Info\" tab in inbasket, or \"Choose Columns\" in Meds & Orders section of the refill encounter.              Passed - Medication is active on med list        Passed - Patient is age 18 or older        Passed - No active pregnancy on record        Passed - No positive pregnancy test in past 12 months             "

## 2021-05-19 ENCOUNTER — OFFICE VISIT (OUTPATIENT)
Dept: OBGYN | Facility: CLINIC | Age: 61
End: 2021-05-19
Payer: COMMERCIAL

## 2021-05-19 VITALS
HEART RATE: 62 BPM | DIASTOLIC BLOOD PRESSURE: 68 MMHG | TEMPERATURE: 97.9 F | BODY MASS INDEX: 23.01 KG/M2 | OXYGEN SATURATION: 96 % | WEIGHT: 99.4 LBS | SYSTOLIC BLOOD PRESSURE: 100 MMHG | HEIGHT: 55 IN

## 2021-05-19 DIAGNOSIS — N94.10 DYSPAREUNIA, FEMALE: Primary | ICD-10-CM

## 2021-05-19 PROCEDURE — 99203 OFFICE O/P NEW LOW 30 MIN: CPT | Performed by: OBSTETRICS & GYNECOLOGY

## 2021-05-19 ASSESSMENT — MIFFLIN-ST. JEOR: SCORE: 873.01

## 2021-06-01 ENCOUNTER — TELEPHONE (OUTPATIENT)
Dept: FAMILY MEDICINE | Facility: CLINIC | Age: 61
End: 2021-06-01

## 2021-06-02 NOTE — TELEPHONE ENCOUNTER
PA Initiation    Medication: hydrocodone-acetaminophen  Insurance Company: [employer] [place] - Phone [phone] Fax [phone]  Pharmacy Filling the Rx: [facility] - [place], [place] - [address]  Filling Pharmacy Phone: [phone]  Filling Pharmacy Fax: [phone]  Start Date: 6/2/2021

## 2021-06-02 NOTE — TELEPHONE ENCOUNTER
Prior Authorization Retail Medication Request    Medication/Dose: hydrocodone-acetaminophen  ICD code (if different than what is on RX):    Previously Tried and Failed:    Rationale:  Patient has used since 2014    Insurance Name:  BC/NISHA SHERMAN  Insurance ID:  Not provided  [facility] Key: [id number]      Pharmacy Information (if different than what is on RX)  Name:    Phone:
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
## 2021-06-03 NOTE — TELEPHONE ENCOUNTER
Prior Authorization Approval    Authorization Effective Date: 3/3/2021  Authorization Expiration Date: 7/3/2021  Medication: hydrocodone-acetaminophen  Approved Dose/Quantity:   Reference #: [id number]   Insurance Company: [employer] [place] - Phone [phone] Fax [phone]  Expected CoPay:       CoPay Card Available:      Foundation Assistance Needed:    Which Pharmacy is filling the prescription (Not needed for infusion/clinic administered): [facility] [id number] [facility]  Pharmacy Notified: Yes  Patient Notified: Yes  **Instructed pharmacy to notify patient when script is ready to /ship.**

## 2021-06-08 ENCOUNTER — HOSPITAL ENCOUNTER (OUTPATIENT)
Dept: PHYSICAL THERAPY | Facility: CLINIC | Age: 61
Setting detail: THERAPIES SERIES
End: 2021-06-08
Attending: OBSTETRICS & GYNECOLOGY
Payer: COMMERCIAL

## 2021-06-08 DIAGNOSIS — N94.10 DYSPAREUNIA, FEMALE: ICD-10-CM

## 2021-06-08 PROCEDURE — 97110 THERAPEUTIC EXERCISES: CPT | Mod: GP | Performed by: PHYSICAL THERAPIST

## 2021-06-08 PROCEDURE — 97535 SELF CARE MNGMENT TRAINING: CPT | Mod: GP | Performed by: PHYSICAL THERAPIST

## 2021-06-08 PROCEDURE — 97140 MANUAL THERAPY 1/> REGIONS: CPT | Mod: GP | Performed by: PHYSICAL THERAPIST

## 2021-06-08 PROCEDURE — 97161 PT EVAL LOW COMPLEX 20 MIN: CPT | Mod: GP | Performed by: PHYSICAL THERAPIST

## 2021-06-09 NOTE — PROGRESS NOTES
[facility]          OUTPATIENT PHYSICAL THERAPY ORTHOPEDIC EVALUATION  PLAN OF TREATMENT FOR OUTPATIENT REHABILITATION  (COMPLETE FOR INITIAL CLAIMS ONLY)  Patient's Last Name, First Name, M.I.  YOB: 1960  China Mckenzie       Provider s Name:  [facility]   Medical Record No.  [id number]   Start of Care Date:  06/08/21   Onset Date:  05/19/21(MD appt)   Type:     _X__PT   ___OT   ___SLP Medical Diagnosis:  Dyspareunia, female     PT Diagnosis:  impaired function at the PFM   Visits from SOC:  1      _________________________________________________________________________________  Plan of Treatment/Functional Goals:  joint mobilization, manual therapy, motor coordination training, neuromuscular re-education, strengthening, stretching     Biofeedback, Electrical stimulation     Goals  Goal Identifier: STG  Goal Description: 1)Pt will report daily Type 4 BMs in 4 weeks.   Target Date: 07/06/21    Goal Identifier: STG  Goal Description: 2)Pt will report 5/10 or less pain with coitus in 4 weeks.   Target Date: 07/06/21    Goal Identifier: LTG  Goal Description: 3)Pt will report 3/10 or less pain with sexual intercourse in 8 weeks.   Target Date: 08/03/21    Goal Identifier: LTG  Goal Description: 4)Pt will be indep in HEP (including dilator use), to prevent return of symptoms in 8 weeks.   Target Date: 08/03/21          Therapy Frequency:  1 time/week  Predicted Duration of Therapy Intervention:  8 weeks for up to 8 sessions    Vera Darden, PT                 I CERTIFY THE NEED FOR THESE SERVICES FURNISHED UNDER        THIS PLAN OF TREATMENT AND WHILE UNDER MY CARE     (Physician co-signature of this document indicates review and certification of the therapy plan).                       Certification Date From:  06/08/21   Certification Date To:  08/03/21    Referring Provider:  Rodger Mak MD    Initial Assessment        See  Epic Evaluation Start of Care Date: 06/08/21
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
## 2021-06-21 DIAGNOSIS — M12.9 ARTHROPATHY: ICD-10-CM

## 2021-06-21 DIAGNOSIS — M79.2 NEUROPATHIC PAIN: ICD-10-CM

## 2021-06-21 DIAGNOSIS — G43.009 MIGRAINE WITHOUT AURA AND WITHOUT STATUS MIGRAINOSUS, NOT INTRACTABLE: ICD-10-CM

## 2021-06-21 DIAGNOSIS — G89.4 CHRONIC PAIN SYNDROME: ICD-10-CM

## 2021-06-22 RX ORDER — TOPIRAMATE 25 MG/1
TABLET, FILM COATED ORAL
Qty: 60 TABLET | Refills: 0 | Status: SHIPPED | OUTPATIENT
Start: 2021-06-22 | End: 2021-07-30

## 2021-06-22 RX ORDER — HYDROCODONE BITARTRATE AND ACETAMINOPHEN 5; 325 MG/1; MG/1
TABLET ORAL
Qty: 60 TABLET | Refills: 0 | Status: SHIPPED | OUTPATIENT
Start: 2021-06-22 | End: 2021-07-30

## 2021-06-22 NOTE — TELEPHONE ENCOUNTER
Patient needs Topiramate today, is out of med and will get a migraine if she does not get med today. Carolann Álvarez on 6/22/2021 at 4:29 PM  ,

## 2021-07-20 ENCOUNTER — TELEPHONE (OUTPATIENT)
Dept: FAMILY MEDICINE | Facility: CLINIC | Age: 61
End: 2021-07-20

## 2021-07-20 NOTE — TELEPHONE ENCOUNTER
LM to continue on Topamax, try OTC Biotene and hard candies.  Pt to call clinic/RN with questions.  Ruben

## 2021-07-20 NOTE — TELEPHONE ENCOUNTER
Patient is taking Topamax, the medication is working well for her headaches but she has terrible dry mouth.  She states that she stopped it and the dry mouth resolved.  She reports that her headaches instantly returned when she stopped the Topamax.  She tried cutting the Topamax down but still drying out mouth.  Any suggestions?  She really would like to stay on the Topamax since it worked well.      Sarah Scott RN

## 2021-07-20 NOTE — TELEPHONE ENCOUNTER
Reason for call:  Patient reporting a symptom    Symptom or request: Pt states that the new Rx for migraines, Topamax, is drying out pt's mouth and she is concerned about dental issues.  Please call patient and advise.      Duration (how long have symptoms been present): ongoing    Have you been treated for this before? No    Additional comments:     Phone Number patient can be reached at:  Home number on file [phone] (home)    Best Time:  any    Can we leave a detailed message on this number:  YES    Call taken on 7/20/2021 at 1:02 PM by Gloria Johnson

## 2021-07-20 NOTE — TELEPHONE ENCOUNTER
Patient reports she has already tried Biotene and hard candy/mints, etc and the minute she spits it out her mouth is dry again.  Is there anything else?    Sarah Scott RN

## 2021-07-20 NOTE — TELEPHONE ENCOUNTER
Recommend she continue on the Topamax and try over the counter Biotene products to help with dry mouth, hard candies, etc to stimulate saliva production.    Heather Mckenzie M.D.

## 2021-07-21 NOTE — TELEPHONE ENCOUNTER
Patient is notified, says she is already drinking plenty of water, not a smoker, uses mints, but she will try the saliva drops.    Patient advised to schedule appointment with PCP to discuss options if not finding the saliva substitutes to be helpful.      MONALISA Huerta

## 2021-07-21 NOTE — TELEPHONE ENCOUNTER
The following are general recommendations.  If she wants to discuss alternative medications she can make an apt.   Heather Mckenzie M.D.        1. Drink water  Sipping water and staying hydrated can help relieve dry mouth. Studies have found that dehydration may be a factor in dry mouth. Increasing your water intake can help treat mild dehydration.    2. Kick dehydrating habits  Here are some recommendations:    Avoid caffeine. Caffeinated beverages can be dehydrating. StudiesTrusted Source have found that drinking caffeinated coffee or tea causes dry mouth.  Limit alcohol use. Alcohol may dehydrate, which can contribute to dry mouth. When experiencing dry mouth, try drinking water instead of alcohol. Interestingly though, alcohol use isn t a proven risk factor. This was tested and established in studies like thisTrusted Source.  Stop smoking. Smoking tobacco can also dehydrate. Cutting down or quitting may reduce dry mouth symptoms. A 2014 studyTrusted Source found that smoking increased dry mouth issues. However, in a 2011 reviewTrusted Source, being a smoker wasn t an associated risk factor.  Drop sugar. Like caffeine, alcohol, and smoking, sugar may dehydrate you. If you can, try cutting down on sugary foods to reduce dry mouth problems. Avoidance of sugar, particularly sugar-containing beverages, was recommended in this 2015 study.    3. Suck on sugarless candies  Sucking on a sugar-free candy may provide some short-term relief from dry mouth. This includes products like cough drops, lozenges, or other candies.    4. Chew sugarless gum  Sugar-free gum can also provide short-term relief from dry mouth. Also, some gum contains xylitol, which helps stimulate saliva production.    5. Improve overall oral care  Dry mouth may be both a symptom and a cause of poor oral hygiene. Improving oral routines could be crucial to protecting the health of your mouth. This includes frequent flossing, fluoride toothpaste use,  and mouthwash use.    6. Use alcohol-free mouthwash  Mouthwash is effective in improving overall oral hygiene, which can factor into dry mouth.    More specifically, mouthwashes containing xylitol helps promote saliva production. This may provide short-term relief, as mentioned in this 2015 reviewTrusted Source.    7. Avoid breathing through your mouth  Mouth-breathing can make dry mouth worse and cause other oral health problems.    Try breathing through your nose more often than your mouth, especially when experiencing any dry mouth discomfort.    8. Get a humidifier  Creating humidity may help dry mouth simply by adding more moisture to your environment.    One study suggested that humidification could moderately improve dry mouth symptoms. Running a humidifier at night may reduce discomfort and improve sleep.    9. Try over-the-counter saliva substitutes  You can purchase saliva substitutes at your local pharmacy. Many different brands offer saliva substitutes, such as xerostom.    These products are great for short-term relief but probably won t cure the cause of your dry mouth.

## 2021-08-03 ENCOUNTER — NURSE TRIAGE (OUTPATIENT)
Dept: FAMILY MEDICINE | Facility: CLINIC | Age: 61
End: 2021-08-03

## 2021-08-03 NOTE — TELEPHONE ENCOUNTER
"    Reason for Disposition    Patient wants to be seen    Additional Information    Negative: Difficult to awaken or acting confused (e.g., disoriented, slurred speech)    Negative: Weakness of the face, arm or leg on one side of the body and new onset    Negative: Numbness of the face, arm or leg on one side of the body and new onset    Negative: Loss of speech or garbled speech and new onset    Negative: Passed out (i.e., fainted, collapsed and was not responding)    Negative: Sounds like a life-threatening emergency to the triager    Negative: Followed a head injury within last 3 days    Negative: Traumatic Brain Injury (TBI) is suspected    Negative: Sinus pain of forehead and yellow or green nasal discharge    Negative: Pregnant    Negative: Unable to walk without falling    Negative: Stiff neck (can't touch chin to chest)    Negative: Possibility of carbon monoxide exposure    Negative: SEVERE headache, states 'worst headache' of life    Negative: SEVERE headache, sudden-onset (i.e., reaching maximum intensity within seconds to 1 hour)    Negative: Severe pain in one eye    Negative: Loss of vision or double vision (Exception: same as prior migraines)    Negative: Patient sounds very sick or weak to the triager    Negative: Fever > 103 F (39.4 C)    Negative: Fever > 100.0 F (37.8 C) and has diabetes mellitus or a weak immune system (e.g., HIV positive, cancer chemotherapy, organ transplant, splenectomy, chronic steroids)    Negative: SEVERE headache (e.g., excruciating) and has had severe headaches before    Negative: SEVERE headache and not relieved by pain meds    Negative: SEVERE headache and vomiting    Negative: SEVERE headache and fever    Negative: New headache and weak immune system (e.g., HIV positive, cancer chemo, splenectomy, organ transplant, chronic steroids)    Negative: Fever present > 3 days (72 hours)    Answer Assessment - Initial Assessment Questions  1. LOCATION: \"Where does it hurt?\"     " "  Everywhere but mostly temples and top of head  2. ONSET: \"When did the headache start?\" (Minutes, hours or days)       3 days ago  3. PATTERN: \"Does the pain come and go, or has it been constant since it started?\"      Comes and goes, comes when due to take preventative and stops after takes preventitive  4. SEVERITY: \"How bad is the pain?\" and \"What does it keep you from doing?\"  (e.g., Scale 1-10; mild, moderate, or severe)    - MILD (1-3): doesn't interfere with normal activities     - MODERATE (4-7): interferes with normal activities or awakens from sleep     - SEVERE (8-10): excruciating pain, unable to do any normal activities         moderate  5. RECURRENT SYMPTOM: \"Have you ever had headaches before?\" If so, ask: \"When was the last time?\" and \"What happened that time?\"       Yes, ended up in ER  6. CAUSE: \"What do you think is causing the headache?\"      Has migraines  7. MIGRAINE: \"Have you been diagnosed with migraine headaches?\" If so, ask: \"Is this headache similar?\"       yes  8. HEAD INJURY: \"Has there been any recent injury to the head?\"       no  9. OTHER SYMPTOMS: \"Do you have any other symptoms?\" (fever, stiff neck, eye pain, sore throat, cold symptoms)      Entire body hurts  10. PREGNANCY: \"Is there any chance you are pregnant?\" \"When was your last menstrual period?\"        n/a    Protocols used: HEADACHE-A-[place]    Barbra Wolff RN    "

## 2021-08-05 ENCOUNTER — VIRTUAL VISIT (OUTPATIENT)
Dept: FAMILY MEDICINE | Facility: CLINIC | Age: 61
End: 2021-08-05
Payer: COMMERCIAL

## 2021-08-05 DIAGNOSIS — G43.009 MIGRAINE WITHOUT AURA AND WITHOUT STATUS MIGRAINOSUS, NOT INTRACTABLE: Primary | ICD-10-CM

## 2021-08-05 PROCEDURE — 99214 OFFICE O/P EST MOD 30 MIN: CPT | Mod: 95 | Performed by: FAMILY MEDICINE

## 2021-08-05 RX ORDER — TOPIRAMATE 100 MG/1
100 TABLET, FILM COATED ORAL DAILY
Qty: 90 TABLET | Refills: 1 | Status: SHIPPED | OUTPATIENT
Start: 2021-08-05 | End: 2022-02-10

## 2021-08-05 NOTE — PROGRESS NOTES
China is a 60 year old who is being evaluated via a billable video visit.      How would you like to obtain your AVS? Mail a copy  If the video visit is dropped, the invitation should be resent by: Text to cell phone: [phone]  Will anyone else be joining your video visit? No    Video Start Time: 10:22 AM    Assessment & Plan     Migraine without aura and without status migrainosus, not intractable   increase topamax to 75 mg for 3-4 days, then increase topamax to 100 mg  Follow up in 1-2 months.   - topiramate (TOPAMAX) 100 MG tablet; Take 1 tablet (100 mg) by mouth daily                   No follow-ups on file.    Heather Mckenzie MD  [facility]   China is a 60 year old who presents for the following health issues;     HPI     Headache  Onset/Duration: ongoing, recently has gotten much worse   Description  Location: bilateral in the temporal area and on top of head   Character: sharp pain  Frequency:  Everyday   Duration:  Sometimes all day   Wake with headaches: YES- not always but sometimes   Able to do daily activities when headache present: YES- when the first come on she can but ends up in bed within a couple hours   Intensity:  moderate  Progression of Symptoms:  worsening and intermittent  Accompanying signs and symptoms:  Stiff neck: no  Neck or upper back pain: no  Sinus or URI symptoms no   Fever: no  Nausea or vomiting: no  Dizziness: no  Numbness/tingling: no  Weakness: no  Visual changes: none  History  Head trauma: no  Family history of migraines: YES- twin brother   Daily pain medication use: YES- topamax and Imitrex as needed   Previous tests for headaches: no  Neurologist evaluation: no  Precipitating or Alleviating factors (light/sound/sleep/caffeine): light makes it much worse   Therapies tried and outcome: Imitrex              Outcome - usually effective  Frequent/daily pain medication use: YES- topamax     Started the Topamax 5/10 with  "office visit.  Is on 50 mg daily.  Initially helped.  Did get some dry mouth and initially thought it was the topamax but in reality it was a caffeine pill she was taking.        The last two times she had a migraine it \"hurt in her body\" worse than she's ever hurt. Ended up taking four of her vicodin in one night and still didn't sleep.         Review of Systems   Constitutional, HEENT, cardiovascular, pulmonary, gi and gu systems are negative, except as otherwise noted.      Objective           Vitals:  No vitals were obtained today due to virtual visit.    Physical Exam   GENERAL: Healthy, alert and no distress  EYES: Eyes grossly normal to inspection.  No discharge or erythema, or obvious scleral/conjunctival abnormalities.  RESP: No audible wheeze, cough, or visible cyanosis.  No visible retractions or increased work of breathing.    SKIN: Visible skin clear. No significant rash, abnormal pigmentation or lesions.  NEURO: Cranial nerves grossly intact.  Mentation and speech appropriate for age.  PSYCH: Mentation appears normal, affect normal/bright, judgement and insight intact, normal speech and appearance well-groomed.                Video-Visit Details    Type of service:  Video Visit    Video End Time:10:35 AM    Originating Location (pt. Location): Home    Distant Location (provider location):  [facility]     Platform used for Video Visit: Jeffry  "

## 2021-08-10 ENCOUNTER — HOSPITAL ENCOUNTER (OUTPATIENT)
Dept: PHYSICAL THERAPY | Facility: CLINIC | Age: 61
Setting detail: THERAPIES SERIES
End: 2021-08-10
Attending: OBSTETRICS & GYNECOLOGY
Payer: COMMERCIAL

## 2021-08-10 PROCEDURE — 97110 THERAPEUTIC EXERCISES: CPT | Mod: GP | Performed by: PHYSICAL THERAPIST

## 2021-08-10 PROCEDURE — 97140 MANUAL THERAPY 1/> REGIONS: CPT | Mod: GP | Performed by: PHYSICAL THERAPIST

## 2021-08-10 NOTE — PROGRESS NOTES
"Physical Therapy Progress Note    Patient Name: China Mckenzie  MR#: [id number]  : 1960  MD name: Rodger Mak MD  Pt seen from: 21 to 8/10/21  Diagnosis: Pelvic Floor Muscle dysfunction; dyspareunia           08/10/21 1000   Signing Clinician's Name / Credentials   Signing clinician's name / credentials Vera Darden, PT MA #[id number]   Session Number   Session Number  (Blue Plus Adv MA)   Authorization status [id number], Cert Req'd   Progress Note/Recertification   Progress Note Due Date 10/05/21   Progress Note Completed Date 08/10/21   Ortho Goal 1   Goal Identifier STG   Goal Description 1)Pt will report daily Type 4 BMs in 4 weeks.    Goal Progress Met: she is having daily BMs of Type 4   Target Date 21   Date Met 08/10/21   Ortho Goal 2   Goal Identifier STG   Goal Description 2)Pt will report 5/10 or less pain with coitus in 4 weeks.    Goal Progress *Has not tried having coitus lately.   Target Date 21   Ortho Goal 3   Goal Identifier LTG   Goal Description 3)Pt will report 3/10 or less pain with sexual intercourse in 8 weeks.    Goal Progress *Has not tried having coitus lately.  (cont for 6 weeks)   Target Date 21   Ortho Goal 4   Goal Identifier LTG   Goal Description 4)Pt will be indep in HEP (including dilator use), to prevent return of symptoms in 8 weeks.    Goal Progress Ongoing and updated each session  (cont for 6 weeks)   Target Date 21   Subjective Report   Subjective Report Pt states primary c/o is that she still not fully recovered her taste from having Covid-19 (from 2020).   Pt states she has been doing the stretching of hips/adductors, but not ordered dilators or done any self-PFM work.  States is embarrassed to look online or \"shop\" for dilators.    Objective Measure 1   Objective Measure BMs   Details Daily bowel mvmts of Type 4 \"design\".    Objective Measure 2   Objective Measure Palpation   Details Continues to have tight/small introitus " "opening.  Tenderness with pressure and \"occasionally it is sharp with wear you are pushing.\"    Therapeutic Procedure/exercise   Therapeutic Procedures: strength, endurance, ROM, flexibillity minutes (45691) 25   Skilled Intervention Exer: self-stretching of PFM, stretching; Establishing HEP   Patient Response Pt agrees to try dilators.    Treatment Detail Reviewed piriformis stretching and adductor stretching. Reviewed and re-taught use of dilators and how to do contract/relax to allow entrance of tip of dilator and then do a slight \"bulge\" motion (like trying to pass gas) to relax PFM and allow for further insertion of dilator.  Pt had an extra \"sample\" dilator kit from [employer], and this was given to pt for home use.    Manual Therapy   Manual Therapy: Mobilization, MFR, MLD, friction massage minutes (96253) 23   Skilled Intervention MT: manual PFM stretching   Patient Response Pt was slightly more amenable to PFM work today, and did not c/o as much pain as in last session.  Willing to try more CR techniques during session.    Treatment Detail Supported supine for manual stretch to transverse perineum.  Used CR technique to enter fingertip, and then had pt do bulging motion to further enter to work on superficial layer of PFM. After stretching introitus, pt was able to relax and allow penetration to deeper LA and PT completed elongation (B) at coccygeus mms.    Plan   Homework [facility]   Home program Given dilators and instructions in use.    Plan for next session See pt in one week. Cont to work on MT for decreasing tension and pain at introitus.  See pt 1x every 2 weeks for up to 4 more sessions.    Total Session Time   Timed Code Treatment Minutes 48 (2TE,2MT)   Total Treatment Time (sum of timed and untimed services) 48 (2TE,2MT)   Thank you for the referral of this patient.  Vera Darden, PT, MA  #[id number]    "

## 2021-08-24 ENCOUNTER — HOSPITAL ENCOUNTER (OUTPATIENT)
Dept: PHYSICAL THERAPY | Facility: CLINIC | Age: 61
Setting detail: THERAPIES SERIES
End: 2021-08-24
Attending: OBSTETRICS & GYNECOLOGY
Payer: COMMERCIAL

## 2021-08-24 PROCEDURE — 97110 THERAPEUTIC EXERCISES: CPT | Mod: GP | Performed by: PHYSICAL THERAPIST

## 2021-08-24 PROCEDURE — 90912 BFB TRAINING 1ST 15 MIN: CPT | Mod: GP | Performed by: PHYSICAL THERAPIST

## 2021-09-01 DIAGNOSIS — G89.4 CHRONIC PAIN SYNDROME: ICD-10-CM

## 2021-09-01 DIAGNOSIS — M12.9 ARTHROPATHY: ICD-10-CM

## 2021-09-01 RX ORDER — HYDROCODONE BITARTRATE AND ACETAMINOPHEN 5; 325 MG/1; MG/1
TABLET ORAL
Qty: 60 TABLET | Refills: 0 | Status: SHIPPED | OUTPATIENT
Start: 2021-09-01 | End: 2022-02-10

## 2021-09-02 ENCOUNTER — OFFICE VISIT (OUTPATIENT)
Dept: FAMILY MEDICINE | Facility: CLINIC | Age: 61
End: 2021-09-02
Payer: COMMERCIAL

## 2021-09-02 VITALS
HEIGHT: 55 IN | RESPIRATION RATE: 16 BRPM | HEART RATE: 80 BPM | DIASTOLIC BLOOD PRESSURE: 62 MMHG | BODY MASS INDEX: 22.91 KG/M2 | WEIGHT: 99 LBS | SYSTOLIC BLOOD PRESSURE: 126 MMHG | TEMPERATURE: 97 F | OXYGEN SATURATION: 98 %

## 2021-09-02 DIAGNOSIS — R05.9 COUGH: Primary | ICD-10-CM

## 2021-09-02 PROCEDURE — 99213 OFFICE O/P EST LOW 20 MIN: CPT | Performed by: FAMILY MEDICINE

## 2021-09-02 RX ORDER — BENZONATATE 200 MG/1
200 CAPSULE ORAL 3 TIMES DAILY PRN
Qty: 30 CAPSULE | Refills: 0 | Status: SHIPPED | OUTPATIENT
Start: 2021-09-02 | End: 2022-02-10

## 2021-09-02 RX ORDER — ALBUTEROL SULFATE 90 UG/1
2 AEROSOL, METERED RESPIRATORY (INHALATION) 4 TIMES DAILY
Qty: 18 G | Refills: 0 | Status: SHIPPED | OUTPATIENT
Start: 2021-09-02 | End: 2022-02-10

## 2021-09-02 ASSESSMENT — MIFFLIN-ST. JEOR: SCORE: 871.19

## 2021-09-02 ASSESSMENT — PAIN SCALES - GENERAL: PAINLEVEL: NO PAIN (0)

## 2021-09-02 NOTE — PROGRESS NOTES
"    Assessment & Plan     Cough   likely allergic vs viral.  No other symptoms to suggest covid, etc.   Trial of tessalon and albuterol  If not clearing consider PFTs and chest x ray.     - albuterol (PROAIR HFA/PROVENTIL HFA/VENTOLIN HFA) 108 (90 Base) MCG/ACT inhaler; Inhale 2 puffs into the lungs 4 times daily  - benzonatate (TESSALON) 200 MG capsule; Take 1 capsule (200 mg) by mouth 3 times daily as needed for cough        No follow-ups on file.    Heather Mckenzie MD  [facility]   China is a 60 year old who presents for the following health issues     HPI     Chief Complaint   Patient presents with     Breathing Problem     Patient feels like everytime she takes a breath in she needs to cough. Patient has a constant lump in her throat. Patient has no additional ENT concerns.      LAB REQUEST     Would like to have covid antibioties tested.        3 weeks of symptoms. No other URI symptoms.  Worse when she tries to exhale forcefully.     Had covid 5/2020 - wanted antibody testing but this will not clinically make any difference in her situation, vaccine is still recommended.     Review of Systems   Constitutional, HEENT, cardiovascular, pulmonary, gi and gu systems are negative, except as otherwise noted.      Objective    /62   Pulse 80   Temp 97  F (36.1  C) (Tympanic)   Resp 16   Ht 1.397 m (4' 7\")   Wt 44.9 kg (99 lb)   SpO2 98%   Breastfeeding No   BMI 23.01 kg/m    Body mass index is 23.01 kg/m .  Physical Exam   GENERAL: healthy, alert and no distress  NECK: no adenopathy, no asymmetry, masses, or scars and thyroid normal to palpation  RESP: lungs clear to auscultation - no rales, rhonchi or wheezes  CV: regular rate and rhythm, normal S1 S2, no S3 or S4, no murmur, click or rub, no peripheral edema and peripheral pulses strong  ABDOMEN: soft, nontender, no hepatosplenomegaly, no masses and bowel sounds normal  MS: no gross musculoskeletal defects " noted, no edema

## 2021-09-02 NOTE — PATIENT INSTRUCTIONS
Our Clinic hours are:  Mondays    7:20 am - 7 pm  Tues -  Fri  7:20 am - 5 pm    Clinic Phone: [phone]    The clinic lab opens at 7:30 am Mon - Fri and appointments are required.    [facility]. [phone]  Monday  8 am - 7pm  Tues - Fri 8 am - 5:30 pm

## 2021-11-18 NOTE — PROGRESS NOTES
"PHYSICAL THERAPY DISCHARGE SUMMARY    DATE:  11/18/2021  NAME:  China Mckenzie           MR#:  [id number]  YOB: 1960  Diagnosis:  Pelvic Floor Muscle Dysfunction  Referring Physician:  Rodger Mak MD    Patient was seen from 6/8/21 to 8/24/21.    Session Number: 3/8 (Blue Plus Adv MA)      Subjective Report: Pt tearful upon arrival today - had an argument with her  prior to session today.  States she tried using the dilators and \"they worked pretty well.\"      Objective Measurements:  Objective Measure: Biofeedback  Details: Abdominals: good level control; PFM: Max = 23uV, Avg = 14uV, rest = 1.5uV, and endurance = 10 sec  Objective Measure: Palpation  Details: Did not do internal work today. Pt very emotional re:  and lack of intimacy.         Goals:    Goal Identifier STG   Goal Description 1)Pt will report daily Type 4 BMs in 4 weeks.    Target Date 07/06/21   Date Met  08/10/21   Progress (detail required for progress note): Met: she is having daily BMs of Type 4     Goal Identifier STG   Goal Description 2)Pt will report 5/10 or less pain with coitus in 4 weeks.    Target Date 09/07/21   Date Met      Progress (detail required for progress note): *Has not tried having coitus lately.     Goal Identifier LTG   Goal Description 3)Pt will report 3/10 or less pain with sexual intercourse in 8 weeks.    Target Date 09/21/21   Date Met      Progress (detail required for progress note): *Has not tried having coitus lately. (cont for 6 weeks)     Goal Identifier LTG   Goal Description 4)Pt will be indep in HEP (including dilator use), to prevent return of symptoms in 8 weeks.    Target Date 09/21/21   Date Met      Progress (detail required for progress note): Ongoing and updated each session (cont for 6 weeks)     Plan:  Discharge from therapy.    Reason for Discharge:  Patient chooses to discontinue therapy.      Vera Darden, PT, MA  #[id number]          "

## 2022-01-25 ENCOUNTER — TELEPHONE (OUTPATIENT)
Dept: FAMILY MEDICINE | Facility: CLINIC | Age: 62
End: 2022-01-25
Payer: COMMERCIAL

## 2022-01-25 NOTE — TELEPHONE ENCOUNTER
Reason for call:  Patient reporting a symptom    Symptom or request: more frequent headaches    Duration (how long have symptoms been present): a few days    Have you been treated for this before? No    Additional comments: please call patient back to advise  DECLINED triage    Phone Number patient can be reached at:  Home number on file [phone] (home)    Best Time:  anytime    Can we leave a detailed message on this number:  YES    Call taken on 1/25/2022 at 1:20 PM by Nadia Yancey

## 2022-01-26 ENCOUNTER — VIRTUAL VISIT (OUTPATIENT)
Dept: FAMILY MEDICINE | Facility: CLINIC | Age: 62
End: 2022-01-26
Payer: COMMERCIAL

## 2022-01-26 DIAGNOSIS — G43.009 MIGRAINE WITHOUT AURA AND WITHOUT STATUS MIGRAINOSUS, NOT INTRACTABLE: Primary | ICD-10-CM

## 2022-01-26 PROCEDURE — 99214 OFFICE O/P EST MOD 30 MIN: CPT | Mod: 95 | Performed by: FAMILY MEDICINE

## 2022-01-26 RX ORDER — PROPRANOLOL HCL 60 MG
60 CAPSULE, EXTENDED RELEASE 24HR ORAL DAILY
Qty: 30 CAPSULE | Refills: 1 | Status: SHIPPED | OUTPATIENT
Start: 2022-01-26 | End: 2022-02-10

## 2022-01-26 NOTE — TELEPHONE ENCOUNTER
Pt has been taking topamax and it was working great until Venkat, when she started getting daily headaches. She  has continued taking the topamax. Pt uses occasional caffeine but not daily. [facility] [place] [facility]. Virtual visit scheduled for 1520.  Lisbet Cantu RN

## 2022-01-26 NOTE — PROGRESS NOTES
"China is a 61 year old who is being evaluated via a billable video visit.      How would you like to obtain your AVS? Mail a copy  If the video visit is dropped, the invitation should be resent by: Text to cell phone: [phone]  Will anyone else be joining your video visit? No    Failed to connect with video with either Snapette or Stacey - changed to telephone visit      Assessment & Plan     Migraine without aura and without status migrainosus, not intractable  Continue topamax.   Add propranolol ER    Follow up in 1 month    Patient was seeing Dr. Marti at [facility], she would \"like to come back\" to see me again.   Will need to be seen in person for CSA for her pain medication  - propranolol ER (INDERAL LA) 60 MG 24 hr capsule; Take 1 capsule (60 mg) by mouth daily                 No follow-ups on file.    Heather Mckenzie MD  [facility]   China is a 61 year old who presents for the following health issues     HPI     Migraine     Since your last clinic visit, how have your headaches changed?  Worsened    How often are you getting headaches or migraines? Almost daily     Are you able to do normal daily activities when you have a migraine? Yes    Are you taking rescue/relief medications? (Select all that apply) sumatriptan (Imitrex)    How helpful is your rescue/relief medication?  I get some relief    Are you taking any medications to prevent migraines? (Select all that apply)  Topamax    In the past 4 weeks, how often have you gone to urgent care or the emergency room because of your headaches?  0     Patient complains that she has full body pain when has a migraine. Feels like she is consumed by pain when having a migraine.      How many servings of fruits and vegetables do you eat daily?  2-3    On average, how many sweetened beverages do you drink each day (Examples: soda, juice, sweet tea, etc.  Do NOT count diet or artificially sweetened beverages)?   0    How " many days per week do you exercise enough to make your heart beat faster? 7    How many minutes a day do you exercise enough to make your heart beat faster? 20 - 29    How many days per week do you miss taking your medication? 0        Review of Systems   Constitutional, HEENT, cardiovascular, pulmonary, gi and gu systems are negative, except as otherwise noted.      Objective           Vitals:  No vitals were obtained today due to virtual visit.    Physical Exam   GENERAL: Healthy, alert and no distress      Telephone visit: total time 8 minutes          Video-Visit Details - failed video call    Type of service:  Video Visit

## 2022-02-10 ENCOUNTER — OFFICE VISIT (OUTPATIENT)
Dept: FAMILY MEDICINE | Facility: CLINIC | Age: 62
End: 2022-02-10
Payer: COMMERCIAL

## 2022-02-10 VITALS
TEMPERATURE: 97 F | HEIGHT: 55 IN | HEART RATE: 57 BPM | DIASTOLIC BLOOD PRESSURE: 56 MMHG | BODY MASS INDEX: 24.07 KG/M2 | RESPIRATION RATE: 14 BRPM | WEIGHT: 104 LBS | OXYGEN SATURATION: 98 % | SYSTOLIC BLOOD PRESSURE: 112 MMHG

## 2022-02-10 DIAGNOSIS — G43.009 MIGRAINE WITHOUT AURA AND WITHOUT STATUS MIGRAINOSUS, NOT INTRACTABLE: ICD-10-CM

## 2022-02-10 DIAGNOSIS — D12.6 TUBULAR ADENOMA OF COLON: ICD-10-CM

## 2022-02-10 DIAGNOSIS — G89.4 CHRONIC PAIN SYNDROME: Primary | ICD-10-CM

## 2022-02-10 DIAGNOSIS — M12.9 ARTHROPATHY: ICD-10-CM

## 2022-02-10 DIAGNOSIS — Z28.21 COVID-19 VACCINE DOSE DECLINED: ICD-10-CM

## 2022-02-10 DIAGNOSIS — Z12.11 COLON CANCER SCREENING: ICD-10-CM

## 2022-02-10 PROCEDURE — 99214 OFFICE O/P EST MOD 30 MIN: CPT | Performed by: FAMILY MEDICINE

## 2022-02-10 RX ORDER — PROPRANOLOL HCL 60 MG
60 CAPSULE, EXTENDED RELEASE 24HR ORAL DAILY
Qty: 90 CAPSULE | Refills: 1 | Status: SHIPPED | OUTPATIENT
Start: 2022-02-10 | End: 2022-05-20

## 2022-02-10 RX ORDER — TOPIRAMATE 100 MG/1
100 TABLET, FILM COATED ORAL DAILY
Qty: 90 TABLET | Refills: 1 | Status: SHIPPED | OUTPATIENT
Start: 2022-02-10 | End: 2022-05-20

## 2022-02-10 RX ORDER — HYDROCODONE BITARTRATE AND ACETAMINOPHEN 5; 325 MG/1; MG/1
1 TABLET ORAL EVERY 6 HOURS PRN
Qty: 30 TABLET | Refills: 0 | Status: SHIPPED | OUTPATIENT
Start: 2022-02-10 | End: 2022-03-03

## 2022-02-10 RX ORDER — SUMATRIPTAN 100 MG/1
TABLET, FILM COATED ORAL
Qty: 9 TABLET | Refills: 11 | Status: SHIPPED | OUTPATIENT
Start: 2022-02-10 | End: 2023-01-06

## 2022-02-10 ASSESSMENT — PATIENT HEALTH QUESTIONNAIRE - PHQ9: SUM OF ALL RESPONSES TO PHQ QUESTIONS 1-9: 0

## 2022-02-10 ASSESSMENT — MIFFLIN-ST. JEOR: SCORE: 888.87

## 2022-02-10 ASSESSMENT — PAIN SCALES - GENERAL: PAINLEVEL: NO PAIN (0)

## 2022-02-10 NOTE — LETTER
Opioid / Opioid Plus Controlled Substance Agreement    This is an agreement between you and your provider about the safe and appropriate use of controlled substance/opioids prescribed by your care team. Controlled substances are medicines that can cause physical and mental dependence (abuse).    There are strict laws about having and using these medicines. We here at [facility] are committing to working with you in your efforts to get better. To support you in this work, we ll help you schedule regular office appointments for medicine refills. If we must cancel or change your appointment for any reason, we ll make sure you have enough medicine to last until your next appointment.     As a Provider, I will:    Listen carefully to your concerns and treat you with respect.     Recommend a treatment plan that I believe is in your best interest. This plan may involve therapies other than opioid pain medication.     Talk with you often about the possible benefits, and the risk of harm of any medicine that we prescribe for you.     Provide a plan on how to taper (discontinue or go off) using this medicine if the decision is made to stop its use.    As a Patient, I understand that opioid(s):     Are a controlled substance prescribed by my care team to help me function or work and manage my condition(s).     Are strong medicines and can cause serious side effects such as:    Drowsiness, which can seriously affect my driving ability    A lower breathing rate, enough to cause death    Harm to my thinking ability     Depression     Abuse of and addiction to this medicine    Need to be taken exactly as prescribed. Combining opioids with certain medicines or chemicals (such as illegal drugs, sedatives, sleeping pills, and benzodiazepines) can be dangerous or even fatal. If I stop opioids suddenly, I may have severe withdrawal symptoms.    Do not work for all types of pain nor for all patients. If they re not helpful, I may  be asked to stop them.        The risks, benefits and side effects of these medicine(s) were explained to me. I agree that:  1. I will take part in other treatments as advised by my care team. This may be psychiatry or counseling, physical therapy, behavioral therapy, group treatment or a referral to a specialist.     2. I will keep all my appointments. I understand that this is part of the monitoring of opioids. My care team may require an office visit for EVERY opioid/controlled substance refill. If I miss appointments or don t follow instructions, my care team may stop my medicine.    3. I will take my medicines as prescribed. I will not change the dose or schedule unless my care team tells me to. There will be no refills if I run out early.     4. I may be asked to come to the clinic and complete a urine drug test or complete a pill count at any time. If I don t give a urine sample or participate in a pill count, the care team may stop my medicine.    5. I will only receive prescriptions from this clinic for chronic pain. If I am treated by another provider for acute pain issues, I will tell them that I am taking opioid pain medication for chronic pain and that I have a treatment agreement with this provider. I will inform my [facility] care team within one business day if I am given a prescription for any pain medication by another healthcare provider. My [facility] care team can contact other providers and pharmacists about my use of any medicines.    6. It is up to me to make sure that I don t run out of my medicines on weekends or holidays. If my care team is willing to refill my opioid prescription without a visit, I must request refills only during office hours. Refills may take up to 3 business days to process. I will use one pharmacy to fill all my opioid and other controlled substance prescriptions. I will notify the clinic about any changes to my insurance or medication  availability.    7. I am responsible for my prescriptions. If the medicine/prescription is lost, stolen or destroyed, it will not be replaced. I also agree not to share controlled substance medicines with anyone.    8. I am aware I should not use any illegal or recreational drugs. I agree not to drink alcohol unless my care team says I can.       9. If I enroll in the [place] Medical Cannabis program, I will tell my care team prior to my next refill.     10. I will tell my care team right away if I become pregnant, have a new medical problem treated outside of my regular clinic, or have a change in my medications.    11. I understand that this medicine can affect my thinking, judgment and reaction time. Alcohol and drugs affect the brain and body, which can affect the safety of my driving. Being under the influence of alcohol or drugs can affect my decision-making, behaviors, personal safety, and the safety of others. Driving while impaired (DWI) can occur if a person is driving, operating, or in physical control of a car, motorcycle, boat, snowmobile, ATV, motorbike, off-road vehicle, or any other motor vehicle ([place] Statute 169A.20). I understand the risk if I choose to drive or operate any vehicle or machinery.    I understand that if I do not follow any of the conditions above, my prescriptions or treatment may be stopped or changed.          Opioids  What You Need to Know    What are opioids?   Opioids are pain medicines that must be prescribed by a doctor. They are also known as narcotics.     Examples are:   1. morphine (MS Contin, Amy)  2. oxycodone (Oxycontin)  3. oxycodone and acetaminophen (Percocet)  4. hydrocodone and acetaminophen (Vicodin, Norco)   5. fentanyl patch (Duragesic)   6. hydromorphone (Dilaudid)   7. methadone  8. codeine (Tylenol #3)     What do opioids do well?   Opioids are best for severe short-term pain such as after a surgery or injury. They may work well for cancer pain. They may  help some people with long-lasting (chronic) pain.     What do opioids NOT do well?   Opioids never get rid of pain entirely, and they don t work well for most patients with chronic pain. Opioids don t reduce swelling, one of the causes of pain.                                    Other ways to manage chronic pain and improve function include:       Treat the health problem that may be causing pain    Anti-inflammation medicines, which reduce swelling and tenderness, such as ibuprofen (Advil, Motrin) or naproxen (Aleve)    Acetaminophen (Tylenol)    Antidepressants and anti-seizure medicines, especially for nerve pain    Topical treatments such as patches or creams    Injections or nerve blocks    Chiropractic or osteopathic treatment    Acupuncture, massage, deep breathing, meditation, visual imagery, aromatherapy    Use heat or ice at the pain site    Physical therapy     Exercise    Stop smoking    Take part in therapy       Risks and side effects     Talk to your doctor before you start or decide to keep taking opioids. Possible side effects include:      Lowering your breathing rate enough to cause death    Overdose, including death, especially if taking higher than prescribed doses    Worse depression symptoms; less pleasure in things you usually enjoy    Feeling tired or sluggish    Slower thoughts or cloudy thinking    Being more sensitive to pain over time; pain is harder to control    Trouble sleeping or restless sleep    Changes in hormone levels (for example, less testosterone)    Changes in sex drive or ability to have sex    Constipation    Unsafe driving    Itching and sweating    Dizziness    Nausea, throwing up and dry mouth    What else should I know about opioids?    Opioids may lead to dependence, tolerance, or addiction.      Dependence means that if you stop or reduce the medicine too quickly, you will have withdrawal symptoms. These include loose poop (diarrhea), jitters, flu-like symptoms,  nervousness and tremors. Dependence is not the same as addiction.                       Tolerance means needing higher doses over time to get the same effect. This may increase the chance of serious side effects.      Addiction is when people improperly use a substance that harms their body, their mind or their relations with others. Use of opiates can cause a relapse of addiction if you have a history of drug or alcohol abuse.      People who have used opioids for a long time may have a lower quality of life, worse depression, higher levels of pain and more visits to doctors.    You can overdose on opioids. Take these steps to lower your risk of overdose:    1. Recognize the signs:  Signs of overdose include decrease or loss of consciousness (blackout), slowed breathing, trouble waking up and blue lips. If someone is worried about overdose, they should call 911.    2. Talk to your doctor about Narcan (naloxone).   If you are at risk for overdose, you may be given a prescription for Narcan. This medicine very quickly reverses the effects of opioids.   If you overdose, a friend or family member can give you Narcan while waiting for the ambulance. They need to know the signs of overdose and how to give Narcan.     3. Don't use alcohol or street drugs.   Taking them with opioids can cause death.    4. Do not take any of these medicines unless your doctor says it s OK. Taking these with opioids can cause death:    Benzodiazepines, such as lorazepam (Ativan), alprazolam (Xanax) or diazepam (Valium)    Muscle relaxers, such as cyclobenzaprine (Flexeril)    Sleeping pills like zolpidem (Ambien)     Other opioids      How to keep you and other people safe while taking opioids:    1. Never share your opioids with others.  Opioid medicines are regulated by the Drug Enforcement Agency ([place]). Selling or sharing medications is a criminal act.    2. Be sure to store opioids in a secure place, locked up if possible. Young children  can easily swallow them and overdose.    3. When you are traveling with your medicines, keep them in the original bottles. If you use a pill box, be sure you also carry a copy of your medicine list from your clinic or pharmacy.    4. Safe disposal of opioids    Most pharmacies have places to get rid of medicine, called disposal kiosks. Medicine disposal options are also available in every [facility]. Search your county and  medication disposal  to find more options. You can find more details at:  [email or web address]     I agree that my provider, clinic care team, and pharmacy may work with any city, state or federal law enforcement agency that investigates the misuse, sale, or other diversion of my controlled medicine. I will allow my provider to discuss my care with, or share a copy of, this agreement with any other treating provider, pharmacy or emergency room where I receive care.    I have read this agreement and have asked questions about anything I did not understand.    _______________________________________________________  Patient Signature - China Mckenzie _____________________                   Date     _______________________________________________________  Provider Signature - Heather Mckenzie MD   _____________________                   Date     _______________________________________________________  Witness Signature (required if provider not present while patient signing)   _____________________                   Date

## 2022-02-10 NOTE — PATIENT INSTRUCTIONS
Health Maintenance reviewed and plan for update discussed.  Patient asked to schedule her pap smear  Patient asked to schedule her mammogram    Someone should call you to schedule colonoscopy - you had a polyp in 2015 and were to have a 5 year colonoscopy for follow up     Mammogram: [phone]        Our Clinic hours are:  Mondays    7:20 am - 7 pm  Tues -  Fri  7:20 am - 5 pm    Clinic Phone: [phone]    The clinic lab opens at 7:30 am Mon - Fri and appointments are required.    [facility]  Ph. [phone]  Monday  8 am - 7pm  Tues - Fri 8 am - 5:30 pm

## 2022-02-10 NOTE — PROGRESS NOTES
Assessment & Plan     Chronic pain syndrome  Will cut back to just #30 norco/month.  Had changed to [facility] back in September and was limited to 20-30/month, this seemed to be working okay, patient still complains of some pain but we again discussed the need to wean down and possibly off of chronic narcotics.  I would not want to go back to higher quantities if we can avoid this.    New CSA signed today  Problem list updated  PDMP reveiwed  - HYDROcodone-acetaminophen (NORCO) 5-325 MG tablet; Take 1 tablet by mouth every 6 hours as needed for severe pain Max 30/month    Arthropathy   as above  - HYDROcodone-acetaminophen (NORCO) 5-325 MG tablet; Take 1 tablet by mouth every 6 hours as needed for severe pain Max 30/month    Migraine without aura and without status migrainosus, not intractable  Improved on the propranolol, continue this and topamax  - SUMAtriptan (IMITREX) 100 MG tablet; TAKE 1 TAB AT ONSET OF MIGRAINE MAY REPEAT AFTER 2 HOURS (MAX 200MG PER 24 HOURS)  - topiramate (TOPAMAX) 100 MG tablet; Take 1 tablet (100 mg) by mouth daily  - propranolol ER (INDERAL LA) 60 MG 24 hr capsule; Take 1 capsule (60 mg) by mouth daily    Tubular adenoma of colon  Overdue for colonoscopy  - Adult Gastro Ref - Procedure Only; Future    Colon cancer screening    - Adult Gastro Ref - Procedure Only; Future    COVID-19 vaccine dose declined  Risks discussed                   Return in about 3 months (around 5/10/2022) for recheck pain medication.    Heather Mckenzie MD  [facility]   China is a 61 year old who presents for the following health issues     HPI     Pain History:  When did you first notice your pain? - More than 6 weeks   Have you seen this provider for your pain in the past?   Yes   Where in your body do you have pain? All over  Are you seeing anyone else for your pain? No    PHQ-9 SCORE 7/29/2020 5/10/2021 2/10/2022   PHQ-9 Total Score - - -   PHQ-9 Total Score 11 0 0  "                Chronic Pain Follow Up:    Location of pain: all over body  Analgesia/pain control:    - Recent changes:  none    - Overall control: Comfortably manageable    - Current treatments: vicodin   Adherence:     - Do you ever take more pain medicine than prescribed? Yes: when pain is extreme after migraines     - When did you take your last dose of pain medicine?  Earlier this afternoon   Adverse effects: No   PDMP Review       Value Time User    State PDMP site checked  Yes 1/26/2022  3:21 PM Heather Mckenzie MD        Last CSA Agreement:   CSA -- Patient Level:    Controlled Substance Agreement - Opioid - Scan on 5/27/2021  2:04 PM: opioid consent       Last UDS: 5/11/2021            Review of Systems   Constitutional, HEENT, cardiovascular, pulmonary, gi and gu systems are negative, except as otherwise noted.      Objective    /56   Pulse 57   Temp 97  F (36.1  C) (Tympanic)   Resp 14   Ht 1.397 m (4' 7\")   Wt 47.2 kg (104 lb)   SpO2 98%   Breastfeeding No   BMI 24.17 kg/m    Body mass index is 24.17 kg/m .  Physical Exam   GENERAL APPEARANCE: healthy, alert and no distress                "

## 2022-03-01 DIAGNOSIS — M62.830 BACK MUSCLE SPASM: ICD-10-CM

## 2022-03-01 RX ORDER — CYCLOBENZAPRINE HCL 10 MG
10 TABLET ORAL 3 TIMES DAILY PRN
Qty: 30 TABLET | Refills: 5 | Status: SHIPPED | OUTPATIENT
Start: 2022-03-01 | End: 2023-10-26

## 2022-03-01 NOTE — TELEPHONE ENCOUNTER
Pending Prescriptions:                       Disp   Refills    cyclobenzaprine (FLEXERIL) 10 MG tablet [*30 tab*5            Sig: Take 1 tablet (10 mg) by mouth 3 times daily as           needed for muscle spasms    Routing refill request to provider for review/approval because:  Drug not on the [facility] refill protocol       Keny Fine RN

## 2022-03-03 ENCOUNTER — TELEPHONE (OUTPATIENT)
Dept: FAMILY MEDICINE | Facility: CLINIC | Age: 62
End: 2022-03-03
Payer: COMMERCIAL

## 2022-03-03 DIAGNOSIS — G89.4 CHRONIC PAIN SYNDROME: ICD-10-CM

## 2022-03-03 DIAGNOSIS — M12.9 ARTHROPATHY: ICD-10-CM

## 2022-03-03 RX ORDER — HYDROCODONE BITARTRATE AND ACETAMINOPHEN 5; 325 MG/1; MG/1
1 TABLET ORAL EVERY 6 HOURS PRN
Qty: 30 TABLET | Refills: 0 | Status: SHIPPED | OUTPATIENT
Start: 2022-03-10 | End: 2022-04-04

## 2022-03-03 NOTE — TELEPHONE ENCOUNTER
Pending Prescriptions:                       Disp   Refills    HYDROcodone-acetaminophen (NORCO) 5-325 M*30 tab*0            Sig: Take 1 tablet by mouth every 6 hours as needed           for severe pain Max 30/month    Routing refill request to provider for review/approval because:  Drug not on the [facility] refill protocol     Keny Fine RN

## 2022-03-03 NOTE — TELEPHONE ENCOUNTER
Refilled but dated for 3/10/2022 - max #30 per month and refilled on 2/10/2022.    Heather Mckenzie M.D.

## 2022-04-05 ENCOUNTER — TELEPHONE (OUTPATIENT)
Dept: FAMILY MEDICINE | Facility: CLINIC | Age: 62
End: 2022-04-05
Payer: COMMERCIAL

## 2022-04-05 DIAGNOSIS — Z80.0 FAMILY HISTORY OF COLON CANCER: Primary | ICD-10-CM

## 2022-04-05 DIAGNOSIS — Z12.11 COLON CANCER SCREENING: ICD-10-CM

## 2022-04-05 NOTE — TELEPHONE ENCOUNTER
Reason for Call:  Other Order    Detailed comments: Patient needs a referral for Colonoscopy. When referral is placed patient would like a phone call back to schedule    Phone Number Patient can be reached at: Cell number on file:    Telephone Information:   Mobile [phone]       Best Time: anytime    Can we leave a detailed message on this number? Not Applicable    Call taken on 4/5/2022 at 1:15 PM by Ayala Escalera

## 2022-04-23 DIAGNOSIS — F41.1 GENERALIZED ANXIETY DISORDER: ICD-10-CM

## 2022-04-23 DIAGNOSIS — M12.9 ARTHROPATHY: ICD-10-CM

## 2022-04-25 RX ORDER — FLUOXETINE 40 MG/1
40 CAPSULE ORAL DAILY
Qty: 90 CAPSULE | Refills: 1 | Status: SHIPPED | OUTPATIENT
Start: 2022-04-25 | End: 2022-09-16

## 2022-05-03 NOTE — TELEPHONE ENCOUNTER
Controlled Substance Refill Request for NORCO  Problem List Complete:  Yes  Medication(s): norco 5/325 taking one tab po bid prn.   Maximum quantity per month: 60  Clinic visit frequency required: Q 3 months      Controlled substance agreement:      Encounter-Level CSA:      There are no encounter-level csa.          Pain Clinic evaluation in the past: No     DIRE Total Score(s):  No flowsheet data found.     Last [facility] website verification:  none   [email or web address]     checked in past 3 months?  No, route to RN   [place] reviewed:  not reviewed  RN not granted access by PCP.     [place]  [email or web address]           Negative Screen

## 2022-05-16 DIAGNOSIS — G89.4 CHRONIC PAIN SYNDROME: ICD-10-CM

## 2022-05-16 DIAGNOSIS — M12.9 ARTHROPATHY: ICD-10-CM

## 2022-05-16 DIAGNOSIS — Z11.59 ENCOUNTER FOR SCREENING FOR OTHER VIRAL DISEASES: Primary | ICD-10-CM

## 2022-05-16 RX ORDER — HYDROCODONE BITARTRATE AND ACETAMINOPHEN 5; 325 MG/1; MG/1
1 TABLET ORAL 2 TIMES DAILY PRN
Qty: 60 TABLET | Refills: 0 | Status: SHIPPED | OUTPATIENT
Start: 2022-05-16 | End: 2022-06-13

## 2022-05-16 NOTE — TELEPHONE ENCOUNTER
Pending Prescriptions:                       Disp   Refills    HYDROcodone-acetaminophen (NORCO) 5-325 M*60 tab*0            Sig: TAKE 1 TABLET BY MOUTH TWICE DAILY AS NEEDED FOR           PAIN    Routing refill request to provider for review/approval because:  Drug not on the [facility] refill protocol       Keny Fine RN

## 2022-05-18 ENCOUNTER — TELEPHONE (OUTPATIENT)
Dept: OBGYN | Facility: CLINIC | Age: 62
End: 2022-05-18
Payer: COMMERCIAL

## 2022-05-18 NOTE — LETTER
May 18, 2022      China Mckenzie  [address] [place] [address]              Dear China,    To ensure we are providing the best quality care, we have reviewed your chart and see that you are due for:    Cervical Cancer Screening:    Please call Dept: [phone] to schedule an Annual Exam with Pap Smear.    If you have completed the tests outside of [facility], please have the results forwarded to our office Fax # [phone]. We will update the chart for your primary Physician to review before your next annual physical.    Breast Cancer Screening:    Please call [phone] to schedule a Mammogram.    You may call Dept: [phone] if you have any questions. If you have completed the mammogram outside of [facility], please have the results forwarded to our office Fax # [phone]. We will update the chart for your primary Physician to review before your next annual physical.      Thank you for trusting us with your health care.      Sincerely,      Rodger Mak MD

## 2022-05-18 NOTE — TELEPHONE ENCOUNTER
Panel Management Review      Health Maintenance List    Health Maintenance   Topic Date Due     ADVANCE CARE PLANNING  Never done     MIGRAINE ACTION PLAN  Never done     COVID-19 Vaccine (1) Never done     ZOSTER IMMUNIZATION (1 of 2) Never done     PREVENTIVE CARE VISIT  05/03/2018     COLORECTAL CANCER SCREENING  04/24/2020     MAMMO SCREENING  01/22/2021     RON ASSESSMENT  02/08/2022     URINE DRUG SCREEN  05/10/2022     HPV TEST  05/03/2022     PAP  05/03/2022     INFLUENZA VACCINE (Season Ended) 09/01/2022     PHQ-9  02/10/2023     TREATMENT AGREEMENT FOR CHRONIC PAIN MANAGEMENT  02/14/2023     LIPID  01/14/2025     DTAP/TDAP/TD IMMUNIZATION (3 - Td or Tdap) 05/03/2027     HEPATITIS C SCREENING  Completed     PHQ-2 (once per calendar year)  Completed     Pneumococcal Vaccine: Pediatrics (0 to 5 Years) and At-Risk Patients (6 to 64 Years)  Aged Out     IPV IMMUNIZATION  Aged Out     MENINGITIS IMMUNIZATION  Aged Out     HEPATITIS B IMMUNIZATION  Aged Out     HIV SCREENING  Discontinued       Composite cancer screening  Chart review shows that this patient is due/due soon for the following Pap Smear and Mammogram  Lab Results   Component Value Date    PAP NIL 05/03/2017     Past Surgical History:   Procedure Laterality Date     BUNIONECTOMY Bilateral     x 4     COLONOSCOPY N/A 4/24/2015    Procedure: COMBINED COLONOSCOPY, SINGLE OR MULTIPLE BIOPSY/POLYPECTOMY BY BIOPSY;  Surgeon: Farheen Bright MD;  Location: [facility]     RELEASE CARPAL TUNNEL Left 10/23/2015    Procedure: RELEASE CARPAL TUNNEL;  Surgeon: Nayan Brown MD;  Location: [facility]       Is hysterectomy listed in surgical history? No   Is mastectomy listed in surgical history? No     Summary:    Patient is due/failing the following:   Pap Smear and Mammogram    Action needed: Patient needs office visit for mammogram and pap smear.    Type of outreach:  Sent letter.      Staff Signature:  RAMON BHAT MA

## 2022-05-19 ENCOUNTER — TELEPHONE (OUTPATIENT)
Dept: OBGYN | Facility: CLINIC | Age: 62
End: 2022-05-19
Payer: COMMERCIAL

## 2022-05-19 NOTE — TELEPHONE ENCOUNTER
Panel Management Review    Health Maintenance List    Health Maintenance   Topic Date Due     ADVANCE CARE PLANNING  Never done     MIGRAINE ACTION PLAN  Never done     COVID-19 Vaccine (1) Never done     ZOSTER IMMUNIZATION (1 of 2) Never done     PREVENTIVE CARE VISIT  05/03/2018     COLORECTAL CANCER SCREENING  04/24/2020     MAMMO SCREENING  01/22/2021     RON ASSESSMENT  02/08/2022     URINE DRUG SCREEN  05/10/2022     HPV TEST  05/03/2022     PAP  05/03/2022     INFLUENZA VACCINE (Season Ended) 09/01/2022     PHQ-9  02/10/2023     TREATMENT AGREEMENT FOR CHRONIC PAIN MANAGEMENT  02/14/2023     LIPID  01/14/2025     DTAP/TDAP/TD IMMUNIZATION (3 - Td or Tdap) 05/03/2027     HEPATITIS C SCREENING  Completed     PHQ-2 (once per calendar year)  Completed     Pneumococcal Vaccine: Pediatrics (0 to 5 Years) and At-Risk Patients (6 to 64 Years)  Aged Out     IPV IMMUNIZATION  Aged Out     MENINGITIS IMMUNIZATION  Aged Out     HEPATITIS B IMMUNIZATION  Aged Out     HIV SCREENING  Discontinued       Composite cancer screening  Chart review shows that this patient is due/due soon for the following Colonoscopy  Lab Results   Component Value Date    PAP NIL 05/03/2017     Past Surgical History:   Procedure Laterality Date     BUNIONECTOMY Bilateral     x 4     COLONOSCOPY N/A 4/24/2015    Procedure: COMBINED COLONOSCOPY, SINGLE OR MULTIPLE BIOPSY/POLYPECTOMY BY BIOPSY;  Surgeon: Farheen Bright MD;  Location: [facility]     RELEASE CARPAL TUNNEL Left 10/23/2015    Procedure: RELEASE CARPAL TUNNEL;  Surgeon: Nayan Brown MD;  Location: [facility]       Is hysterectomy listed in surgical history? No   Is mastectomy listed in surgical history? No     Summary:    Patient is due/failing the following:   Colonoscopy    Action needed: Patient needs office visit for colon screening.    Type of outreach:  Sent letter      Staff Signature:  RAMON BHAT MA

## 2022-05-19 NOTE — LETTER
May 19, 2022      China Mckenzie  [address] [place] [address]              Dear China,    To ensure we are providing the best quality care, we have reviewed your chart and see that you are due for:    Colon Cancer Screening:    If you have received a referral to schedule a Colonoscopy or choose to do a Colonoscopy instead of the FIT/ Cologuard test, please call [phone] to schedule.    If you have received a FIT test kit from a prior office visit, please check the expiration date. If , please get a new kit from the Lab/ Clinic. If not , please complete the test and mail in as soon as you are able.    If you would prefer to complete a Cologuard test, please reach out to your provider to see if this is an option for you.    You may call Dept: [phone] if you have any questions. If you have completed the tests outside of [facility], please have the results forwarded to our office Fax # [phone]. We will update the chart for your primary Physician to review before your next annual physical.            Sincerely,      Rodger Mak MD

## 2022-05-20 ENCOUNTER — TELEPHONE (OUTPATIENT)
Dept: FAMILY MEDICINE | Facility: CLINIC | Age: 62
End: 2022-05-20

## 2022-05-20 ENCOUNTER — OFFICE VISIT (OUTPATIENT)
Dept: FAMILY MEDICINE | Facility: CLINIC | Age: 62
End: 2022-05-20
Payer: COMMERCIAL

## 2022-05-20 VITALS
OXYGEN SATURATION: 98 % | TEMPERATURE: 97 F | SYSTOLIC BLOOD PRESSURE: 136 MMHG | HEIGHT: 55 IN | RESPIRATION RATE: 14 BRPM | DIASTOLIC BLOOD PRESSURE: 82 MMHG | WEIGHT: 102 LBS | HEART RATE: 52 BPM | BODY MASS INDEX: 23.61 KG/M2

## 2022-05-20 DIAGNOSIS — N18.31 STAGE 3A CHRONIC KIDNEY DISEASE (H): ICD-10-CM

## 2022-05-20 DIAGNOSIS — Z00.00 ROUTINE GENERAL MEDICAL EXAMINATION AT A HEALTH CARE FACILITY: Primary | ICD-10-CM

## 2022-05-20 DIAGNOSIS — G43.009 MIGRAINE WITHOUT AURA AND WITHOUT STATUS MIGRAINOSUS, NOT INTRACTABLE: ICD-10-CM

## 2022-05-20 DIAGNOSIS — G89.4 CHRONIC PAIN SYNDROME: ICD-10-CM

## 2022-05-20 DIAGNOSIS — M76.30 IT BAND SYNDROME, UNSPECIFIED LATERALITY: ICD-10-CM

## 2022-05-20 DIAGNOSIS — Z79.899 ENCOUNTER FOR LONG-TERM CURRENT USE OF MEDICATION: ICD-10-CM

## 2022-05-20 DIAGNOSIS — M62.830 BACK MUSCLE SPASM: ICD-10-CM

## 2022-05-20 DIAGNOSIS — N94.10 DYSPAREUNIA IN FEMALE: ICD-10-CM

## 2022-05-20 DIAGNOSIS — N95.2 ATROPHIC VAGINITIS: ICD-10-CM

## 2022-05-20 DIAGNOSIS — Z12.4 SCREENING FOR CERVICAL CANCER: ICD-10-CM

## 2022-05-20 DIAGNOSIS — N95.2 ATROPHIC VAGINITIS: Primary | ICD-10-CM

## 2022-05-20 LAB
ANION GAP SERPL CALCULATED.3IONS-SCNC: 5 MMOL/L (ref 3–14)
BUN SERPL-MCNC: 22 MG/DL (ref 7–30)
CALCIUM SERPL-MCNC: 8.9 MG/DL (ref 8.5–10.1)
CHLORIDE BLD-SCNC: 109 MMOL/L (ref 94–109)
CHOLEST SERPL-MCNC: 212 MG/DL
CO2 SERPL-SCNC: 28 MMOL/L (ref 20–32)
CREAT SERPL-MCNC: 1.06 MG/DL (ref 0.52–1.04)
FASTING STATUS PATIENT QL REPORTED: YES
GFR SERPL CREATININE-BSD FRML MDRD: 60 ML/MIN/1.73M2
GLUCOSE BLD-MCNC: 106 MG/DL (ref 70–99)
HDLC SERPL-MCNC: 74 MG/DL
HGB BLD-MCNC: 13.4 G/DL (ref 11.7–15.7)
LDLC SERPL CALC-MCNC: 124 MG/DL
NONHDLC SERPL-MCNC: 138 MG/DL
POTASSIUM BLD-SCNC: 4.3 MMOL/L (ref 3.4–5.3)
SODIUM SERPL-SCNC: 142 MMOL/L (ref 133–144)
TRIGL SERPL-MCNC: 72 MG/DL

## 2022-05-20 PROCEDURE — 99396 PREV VISIT EST AGE 40-64: CPT | Performed by: FAMILY MEDICINE

## 2022-05-20 PROCEDURE — G0145 SCR C/V CYTO,THINLAYER,RESCR: HCPCS | Performed by: FAMILY MEDICINE

## 2022-05-20 PROCEDURE — 36415 COLL VENOUS BLD VENIPUNCTURE: CPT | Performed by: FAMILY MEDICINE

## 2022-05-20 PROCEDURE — 85018 HEMOGLOBIN: CPT | Performed by: FAMILY MEDICINE

## 2022-05-20 PROCEDURE — 80061 LIPID PANEL: CPT | Performed by: FAMILY MEDICINE

## 2022-05-20 PROCEDURE — 99214 OFFICE O/P EST MOD 30 MIN: CPT | Mod: 25 | Performed by: FAMILY MEDICINE

## 2022-05-20 PROCEDURE — 80048 BASIC METABOLIC PNL TOTAL CA: CPT | Performed by: FAMILY MEDICINE

## 2022-05-20 PROCEDURE — 87624 HPV HI-RISK TYP POOLED RSLT: CPT | Performed by: FAMILY MEDICINE

## 2022-05-20 RX ORDER — CYCLOBENZAPRINE HCL 10 MG
10 TABLET ORAL 3 TIMES DAILY PRN
Qty: 30 TABLET | Refills: 5 | Status: CANCELLED | OUTPATIENT
Start: 2022-05-20

## 2022-05-20 RX ORDER — PROPRANOLOL HCL 60 MG
60 CAPSULE, EXTENDED RELEASE 24HR ORAL DAILY
Qty: 90 CAPSULE | Refills: 3 | Status: SHIPPED | OUTPATIENT
Start: 2022-05-20 | End: 2023-05-31

## 2022-05-20 RX ORDER — TOPIRAMATE 100 MG/1
100 TABLET, FILM COATED ORAL DAILY
Qty: 90 TABLET | Refills: 3 | Status: SHIPPED | OUTPATIENT
Start: 2022-05-20 | End: 2022-09-29

## 2022-05-20 ASSESSMENT — ANXIETY QUESTIONNAIRES
6. BECOMING EASILY ANNOYED OR IRRITABLE: NOT AT ALL
5. BEING SO RESTLESS THAT IT IS HARD TO SIT STILL: SEVERAL DAYS
2. NOT BEING ABLE TO STOP OR CONTROL WORRYING: SEVERAL DAYS
7. FEELING AFRAID AS IF SOMETHING AWFUL MIGHT HAPPEN: NOT AT ALL
4. TROUBLE RELAXING: SEVERAL DAYS
GAD7 TOTAL SCORE: 5
7. FEELING AFRAID AS IF SOMETHING AWFUL MIGHT HAPPEN: NOT AT ALL
GAD7 TOTAL SCORE: 5
8. IF YOU CHECKED OFF ANY PROBLEMS, HOW DIFFICULT HAVE THESE MADE IT FOR YOU TO DO YOUR WORK, TAKE CARE OF THINGS AT HOME, OR GET ALONG WITH OTHER PEOPLE?: SOMEWHAT DIFFICULT
3. WORRYING TOO MUCH ABOUT DIFFERENT THINGS: SEVERAL DAYS
1. FEELING NERVOUS, ANXIOUS, OR ON EDGE: SEVERAL DAYS
GAD7 TOTAL SCORE: 5

## 2022-05-20 ASSESSMENT — ENCOUNTER SYMPTOMS: MYALGIAS: 1

## 2022-05-20 ASSESSMENT — PAIN SCALES - GENERAL: PAINLEVEL: NO PAIN (0)

## 2022-05-20 NOTE — TELEPHONE ENCOUNTER
[facility] PREMARIN 0.625mg/gm cREAM is not covered by patient's Insurance Company  Dr. WAYNE   - Please choose:  1.  Change medication that is not covered to a different medication and send new prescription to patient's pharmacy?    ESTRADIOL    NOT REQUIRED  YUVAFERN      NOT REQUIRED  2.  Patient will need to pay for the non-covered medication out-of-pocket?   3.  Try for Prior Authorization with Insurance Company to get medication covered?      KEY: [id number]   TONE   09/018/1960

## 2022-05-20 NOTE — LETTER
May 23, 2022      China Mckenzie  [id number] [place] [address]        Dear ,    We are writing to inform you of your test results.    Kidney function is stable, slightly low kidney function.     Work on increased water/fluid intake.     Avoid NSAIDS (ibuprofen, naproxen).     We use blood tests to check how well your kidneys are functioning. These tests measure creatinine. They also calculate eGFR (estimated glomerular filtration rate).     Your eGFR is less than 60. This means you have stage 3 chronic kidney disease.   This is quite common and not something you should worry about. We will do regular tests to monitor your kidney function. We will also help you control risk factors that might worsen your kidney function.     As the eGFR test is becoming more available, the medical community is renewing their focus on managing chronic kidney disease. You will likely hear more about your kidney disease during future medical appointments.       Resulted Orders   Basic metabolic panel  (Ca, Cl, CO2, Creat, Gluc, K, Na, BUN)   Result Value Ref Range    Sodium 142 133 - 144 mmol/L    Potassium 4.3 3.4 - 5.3 mmol/L    Chloride 109 94 - 109 mmol/L    Carbon Dioxide (CO2) 28 20 - 32 mmol/L    Anion Gap 5 3 - 14 mmol/L    Urea Nitrogen 22 7 - 30 mg/dL    Creatinine 1.06 (H) 0.52 - 1.04 mg/dL    Calcium 8.9 8.5 - 10.1 mg/dL    Glucose 106 (H) 70 - 99 mg/dL    GFR Estimate 60 (L) >60 mL/min/1.73m2      Comment:      Effective December 21, 2021 eGFRcr in adults is calculated using the 2021 CKD-EPI creatinine equation which includes age and gender (Cortez donnelly al., NEJM, DOI: 10.1056/WKFXat2520102)   Hemoglobin   Result Value Ref Range    Hemoglobin 13.4 11.7 - 15.7 g/dL   Lipid panel reflex to direct LDL Fasting   Result Value Ref Range    Cholesterol 212 (H) <200 mg/dL    Triglycerides 72 <150 mg/dL    Direct Measure HDL 74 >=50 mg/dL    LDL Cholesterol Calculated 124 (H) <=100 mg/dL    Non HDL Cholesterol 138  (H) <130 mg/dL    Patient Fasting > 8hrs? Yes     Narrative    Cholesterol  Desirable:  <200 mg/dL    Triglycerides  Normal:  Less than 150 mg/dL  Borderline High:  150-199 mg/dL  High:  200-499 mg/dL  Very High:  Greater than or equal to 500 mg/dL    Direct Measure HDL  Female:  Greater than or equal to 50 mg/dL   Male:  Greater than or equal to 40 mg/dL    LDL Cholesterol  Desirable:  <100mg/dL  Above Desirable:  100-129 mg/dL   Borderline High:  130-159 mg/dL   High:  160-189 mg/dL   Very High:  >= 190 mg/dL    Non HDL Cholesterol  Desirable:  130 mg/dL  Above Desirable:  130-159 mg/dL  Borderline High:  160-189 mg/dL  High:  190-219 mg/dL  Very High:  Greater than or equal to 220 mg/dL       If you have any questions or concerns, please call the clinic at the number listed above.       Sincerely,      Heather Mckenzie MD

## 2022-05-20 NOTE — PROGRESS NOTES
SUBJECTIVE:   CC: China Mckenzie is an 61 year old woman who presents for preventive health visit.       Patient has been advised of split billing requirements and indicates understanding: Yes  Healthy Habits:     Getting at least 3 servings of Calcium per day:  Yes    Bi-annual eye exam:  Yes    Dental care twice a year:  Yes    Sleep apnea or symptoms of sleep apnea:  Daytime drowsiness    Diet:  Carbohydrate counting    Frequency of exercise:  4-5 days/week    Duration of exercise:  45-60 minutes    Taking medications regularly:  Yes    Medication side effects:  None    PHQ-2 Total Score: 0    Additional concerns today:  No          Depression and Anxiety Follow-Up    How are you doing with your depression since your last visit? Improved     How are you doing with your anxiety since your last visit?  Improved     Are you having other symptoms that might be associated with depression or anxiety? No    Have you had a significant life event? No     Do you have any concerns with your use of alcohol or other drugs? No    Social History     Tobacco Use     Smoking status: Never Smoker     Smokeless tobacco: Never Used   Vaping Use     Vaping Use: Never used   Substance Use Topics     Alcohol use: Yes     Comment: rare     Drug use: No     PHQ 7/29/2020 5/10/2021 2/10/2022   PHQ-9 Total Score 11 0 0   Q9: Thoughts of better off dead/self-harm past 2 weeks Not at all Not at all Not at all     RON-7 SCORE 7/29/2020 2/8/2021 5/20/2022   Total Score - - -   Total Score - - 5 (mild anxiety)   Total Score 10 7 5     Last PHQ-9 2/10/2022   1.  Little interest or pleasure in doing things 0   2.  Feeling down, depressed, or hopeless 0   3.  Trouble falling or staying asleep, or sleeping too much 0   4.  Feeling tired or having little energy 0   5.  Poor appetite or overeating 0   6.  Feeling bad about yourself 0   7.  Trouble concentrating 0   8.  Moving slowly or restless 0   Q9: Thoughts of better off dead/self-harm  past 2 weeks 0   PHQ-9 Total Score 0   Difficulty at work, home, or with people Not difficult at all     RON-7  5/20/2022   1. Feeling nervous, anxious, or on edge 1   2. Not being able to stop or control worrying 1   3. Worrying too much about different things 1   4. Trouble relaxing 1   5. Being so restless that it is hard to sit still 1   6. Becoming easily annoyed or irritable 0   7. Feeling afraid, as if something awful might happen 0   RON-7 Total Score 5   If you checked any problems, how difficult have they made it for you to do your work, take care of things at home, or get along with other people? -       Suicide Assessment Five-step Evaluation and Treatment (SAFE-T)      Today's PHQ-2 Score:   PHQ-2 ( 1999 [employer]) 5/20/2022   Q1: Little interest or pleasure in doing things 0   Q2: Feeling down, depressed or hopeless 0   PHQ-2 Score 0   PHQ-2 Total Score (12-17 Years)- Positive if 3 or more points; Administer PHQ-A if positive -   Q1: Little interest or pleasure in doing things Not at all   Q2: Feeling down, depressed or hopeless Not at all   PHQ-2 Score 0       Abuse: Current or Past (Physical, Sexual or Emotional) - No  Do you feel safe in your environment? Yes    Have you ever done Advance Care Planning? (For example, a Health Directive, POLST, or a discussion with a medical provider or your loved ones about your wishes): No, advance care planning information given to patient to review.  Patient plans to discuss their wishes with loved ones or provider.      Social History     Tobacco Use     Smoking status: Never Smoker     Smokeless tobacco: Never Used   Substance Use Topics     Alcohol use: Yes     Comment: rare     If you drink alcohol do you typically have >3 drinks per day or >7 drinks per week? No    Alcohol Use 5/20/2022   Prescreen: >3 drinks/day or >7 drinks/week? Not Applicable   Prescreen: >3 drinks/day or >7 drinks/week? -   No flowsheet data found.    Reviewed orders with patient.   "Reviewed health maintenance and updated orders accordingly - Yes  Lab work is in process    Breast Cancer Screening:    FHS-7:   Breast CA Risk Assessment (FHS-7) 5/20/2022   Did any of your first-degree relatives have breast or ovarian cancer? Yes   Did any of your relatives have bilateral breast cancer? No   Did any man in your family have breast cancer? No   Did any woman in your family have breast and ovarian cancer? Yes   Did any woman in your family have breast cancer before age 50 y? Yes   Do you have 2 or more relatives with breast and/or ovarian cancer? No   Do you have 2 or more relatives with breast and/or bowel cancer? Unknown       Mammogram Screening: Recommended mammography every 1-2 years with patient discussion and risk factor consideration  Pertinent mammograms are reviewed under the imaging tab.    History of abnormal Pap smear: NO - age 30-65 PAP every 5 years with negative HPV co-testing recommended  PAP / HPV Latest Ref Rng & Units 5/3/2017 1/21/2014   PAP (Historical) - NIL NIL   HPV16 NEG Negative -   HPV18 NEG Negative -   HRHPV NEG Negative -     Reviewed and updated as needed this visit by clinical staff   Tobacco  Allergies  Meds   Med Hx  Surg Hx  Fam Hx  Soc Hx          Reviewed and updated as needed this visit by Provider                       Review of Systems   Genitourinary: Positive for urgency.   Musculoskeletal: Positive for myalgias.          OBJECTIVE:   /82   Pulse 52   Temp 97  F (36.1  C) (Tympanic)   Resp 14   Ht 1.397 m (4' 7\")   Wt 46.3 kg (102 lb)   SpO2 98%   Breastfeeding No   BMI 23.71 kg/m    Physical Exam  GENERAL: healthy, alert and no distress  NECK: no adenopathy, no asymmetry, masses, or scars and thyroid normal to palpation  RESP: lungs clear to auscultation - no rales, rhonchi or wheezes  CV: regular rate and rhythm, normal S1 S2, no S3 or S4, no murmur, click or rub, no peripheral edema and peripheral pulses strong  ABDOMEN: soft, " nontender, no hepatosplenomegaly, no masses and bowel sounds normal  MS: no gross musculoskeletal defects noted, no edema        ASSESSMENT/PLAN:   (Z00.00) Routine general medical examination at a health care facility  (primary encounter diagnosis)  Comment:    Plan:      (G43.009) Migraine without aura and without status migrainosus, not intractable  Comment:  Well managed  Plan: topiramate (TOPAMAX) 100 MG tablet, propranolol        ER (INDERAL LA) 60 MG 24 hr capsule             (M62.830) Back muscle spasm  Comment:    Plan:  Has flexeril at home    (G89.4) Chronic pain syndrome  Comment: UDS due today  CSA up to date in Feb  Plan: [id number] - Urine Drugs of Abuse Panel 13 -         (Screening) - ([facility] Only)             (Z79.899) Encounter for long-term current use of medication  Comment:    Plan: [id number] - Urine Drugs of Abuse Panel 13 -         (Screening) - ([facility] Only)  - patient refused to leave a urine today  -will need Utox before her next refill - due 6/15             (M76.30) It band syndrome, unspecified laterality  Comment:  Has worked with [facility], is using a foam roller, would like to try PT  Plan: Physical Therapy Referral             (N18.31) Stage 3a chronic kidney disease (H)  Comment:    Plan: Albumin Random Urine Quantitative with Creat         Ratio, Basic metabolic panel  (Ca, Cl, CO2,         Creat, Gluc, K, Na, BUN), Hemoglobin, Lipid         panel reflex to direct LDL Fasting         Noted on outside labs in [facility], will recheck     (Z12.4) Screening for cervical cancer  Comment:  Pap done  Plan: Pap screen with HPV - recommended age 30 - 65         years             (N95.2) Atrophic vaginitis  Comment:    Plan: conjugated estrogens (PREMARIN) 0.625 MG/GM         vaginal cream         Recommend vaginal dilators (has these at home)  Start vaginal estrogen      (N94.10) Dyspareunia in female  Comment:    Plan: conjugated estrogens (PREMARIN) 0.625 MG/GM         vaginal cream          "      Patient has been advised of split billing requirements and indicates understanding: Yes    COUNSELING:  Reviewed preventive health counseling, as reflected in patient instructions       Regular exercise       Healthy diet/nutrition    Estimated body mass index is 23.71 kg/m  as calculated from the following:    Height as of this encounter: 1.397 m (4' 7\").    Weight as of this encounter: 46.3 kg (102 lb).        She reports that she has never smoked. She has never used smokeless tobacco.      Counseling Resources:  ATP IV Guidelines  Pooled Cohorts Equation Calculator  Breast Cancer Risk Calculator  BRCA-Related Cancer Risk Assessment: FHS-7 Tool  FRAX Risk Assessment  ICSI Preventive Guidelines  Dietary Guidelines for Americans, 2010  USDA's MyPlate  ASA Prophylaxis  Lung CA Screening    Heather Mckenzie MD  [facility]  Answers for HPI/ROS submitted by the patient on 5/20/2022  RON 7 TOTAL SCORE: 5      "

## 2022-05-20 NOTE — PATIENT INSTRUCTIONS

## 2022-05-23 RX ORDER — ESTRADIOL 10 UG/1
10 INSERT VAGINAL
Qty: 24 TABLET | Refills: 3 | Status: SHIPPED | OUTPATIENT
Start: 2022-05-23 | End: 2022-09-16

## 2022-05-23 NOTE — TELEPHONE ENCOUNTER
Writer called patient and informed her of the new prescription at [facility] and the reason for the change.    Patient stated understanding.    Luciano SHELDON [facility]

## 2022-05-23 NOTE — TELEPHONE ENCOUNTER
New prescription for vagifem/estradiol tablets to use twice weekly due to coverage.    Notify patient - the cream was not covered, but it appears that vaginal tablets will be covered.  Heather Mckenzie M.D.

## 2022-05-24 LAB
BKR LAB AP GYN ADEQUACY: NORMAL
BKR LAB AP GYN INTERPRETATION: NORMAL
BKR LAB AP HPV REFLEX: NORMAL
BKR LAB AP PREVIOUS ABNORMAL: NORMAL
PATH REPORT.COMMENTS IMP SPEC: NORMAL
PATH REPORT.COMMENTS IMP SPEC: NORMAL
PATH REPORT.RELEVANT HX SPEC: NORMAL

## 2022-05-26 LAB
HUMAN PAPILLOMA VIRUS 16 DNA: NEGATIVE
HUMAN PAPILLOMA VIRUS 18 DNA: NEGATIVE
HUMAN PAPILLOMA VIRUS FINAL DIAGNOSIS: NORMAL
HUMAN PAPILLOMA VIRUS OTHER HR: NEGATIVE

## 2022-05-31 ENCOUNTER — LAB (OUTPATIENT)
Dept: LAB | Facility: CLINIC | Age: 62
End: 2022-05-31
Payer: COMMERCIAL

## 2022-05-31 DIAGNOSIS — N18.31 STAGE 3A CHRONIC KIDNEY DISEASE (H): Primary | ICD-10-CM

## 2022-05-31 DIAGNOSIS — G89.4 CHRONIC PAIN SYNDROME: ICD-10-CM

## 2022-05-31 LAB
CREAT UR-MCNC: 45 MG/DL
CREAT UR-MCNC: 46 MG/DL
MICROALBUMIN UR-MCNC: <5 MG/L
MICROALBUMIN/CREAT UR: NORMAL MG/G{CREAT}

## 2022-05-31 PROCEDURE — 82043 UR ALBUMIN QUANTITATIVE: CPT

## 2022-05-31 PROCEDURE — 80307 DRUG TEST PRSMV CHEM ANLYZR: CPT

## 2022-06-10 ENCOUNTER — TELEPHONE (OUTPATIENT)
Dept: FAMILY MEDICINE | Facility: CLINIC | Age: 62
End: 2022-06-10
Payer: COMMERCIAL

## 2022-06-10 DIAGNOSIS — G89.4 CHRONIC PAIN SYNDROME: ICD-10-CM

## 2022-06-10 DIAGNOSIS — M12.9 ARTHROPATHY: ICD-10-CM

## 2022-06-13 RX ORDER — HYDROCODONE BITARTRATE AND ACETAMINOPHEN 5; 325 MG/1; MG/1
1 TABLET ORAL 2 TIMES DAILY PRN
Qty: 60 TABLET | Refills: 0 | Status: SHIPPED | OUTPATIENT
Start: 2022-06-15 | End: 2022-07-06

## 2022-06-13 NOTE — TELEPHONE ENCOUNTER
Pending Prescriptions:                       Disp   Refills    HYDROcodone-acetaminophen (NORCO) 5-325 M*60 tab*0            Sig: Take 1 tablet by mouth 2 times daily as needed           for severe pain. VISIT DUE    Routing refill request to provider for review/approval because:  Drug not on the G refill protocol       Keny Fine RN

## 2022-06-13 NOTE — TELEPHONE ENCOUNTER
PDMP reviewed  Based on last fill date, next refill due 6/15 - postdated prescription    Heather Mckenzie M.D.

## 2022-06-20 ENCOUNTER — ANCILLARY PROCEDURE (OUTPATIENT)
Dept: MAMMOGRAPHY | Facility: CLINIC | Age: 62
End: 2022-06-20
Attending: FAMILY MEDICINE
Payer: COMMERCIAL

## 2022-06-20 DIAGNOSIS — Z12.31 VISIT FOR SCREENING MAMMOGRAM: ICD-10-CM

## 2022-06-20 PROCEDURE — 77067 SCR MAMMO BI INCL CAD: CPT | Mod: TC | Performed by: RADIOLOGY

## 2022-06-21 ENCOUNTER — LAB (OUTPATIENT)
Dept: LAB | Facility: CLINIC | Age: 62
End: 2022-06-21
Attending: SURGERY
Payer: COMMERCIAL

## 2022-06-21 DIAGNOSIS — Z11.59 ENCOUNTER FOR SCREENING FOR OTHER VIRAL DISEASES: ICD-10-CM

## 2022-06-21 PROCEDURE — U0003 INFECTIOUS AGENT DETECTION BY NUCLEIC ACID (DNA OR RNA); SEVERE ACUTE RESPIRATORY SYNDROME CORONAVIRUS 2 (SARS-COV-2) (CORONAVIRUS DISEASE [COVID-19]), AMPLIFIED PROBE TECHNIQUE, MAKING USE OF HIGH THROUGHPUT TECHNOLOGIES AS DESCRIBED BY CMS-2020-01-R: HCPCS

## 2022-06-21 PROCEDURE — U0005 INFEC AGEN DETEC AMPLI PROBE: HCPCS

## 2022-06-22 ENCOUNTER — ANESTHESIA EVENT (OUTPATIENT)
Dept: GASTROENTEROLOGY | Facility: CLINIC | Age: 62
End: 2022-06-22
Payer: COMMERCIAL

## 2022-06-22 LAB — SARS-COV-2 RNA RESP QL NAA+PROBE: NEGATIVE
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
## 2022-06-24 ENCOUNTER — HOSPITAL ENCOUNTER (OUTPATIENT)
Facility: CLINIC | Age: 62
Discharge: HOME | End: 2022-06-24
Attending: SURGERY | Admitting: SURGERY
Payer: COMMERCIAL

## 2022-06-24 ENCOUNTER — ANESTHESIA (OUTPATIENT)
Dept: GASTROENTEROLOGY | Facility: CLINIC | Age: 62
End: 2022-06-24
Payer: COMMERCIAL

## 2022-06-24 VITALS
DIASTOLIC BLOOD PRESSURE: 78 MMHG | RESPIRATION RATE: 18 BRPM | SYSTOLIC BLOOD PRESSURE: 120 MMHG | WEIGHT: 99 LBS | BODY MASS INDEX: 22.91 KG/M2 | HEIGHT: 55 IN | HEART RATE: 57 BPM | TEMPERATURE: 96.1 F | OXYGEN SATURATION: 100 %

## 2022-06-24 LAB — COLONOSCOPY: NORMAL

## 2022-06-24 PROCEDURE — 45380 COLONOSCOPY AND BIOPSY: CPT | Mod: PT | Performed by: SURGERY

## 2022-06-24 PROCEDURE — 250N000009 HC RX 250: Performed by: NURSE ANESTHETIST, CERTIFIED REGISTERED

## 2022-06-24 PROCEDURE — 250N000011 HC RX IP 250 OP 636: Performed by: NURSE ANESTHETIST, CERTIFIED REGISTERED

## 2022-06-24 PROCEDURE — 45380 COLONOSCOPY AND BIOPSY: CPT | Mod: PT,XU | Performed by: SURGERY

## 2022-06-24 PROCEDURE — 45385 COLONOSCOPY W/LESION REMOVAL: CPT | Mod: PT | Performed by: SURGERY

## 2022-06-24 PROCEDURE — 88305 TISSUE EXAM BY PATHOLOGIST: CPT | Mod: TC | Performed by: SURGERY

## 2022-06-24 PROCEDURE — 370N000017 HC ANESTHESIA TECHNICAL FEE, PER MIN: Performed by: SURGERY

## 2022-06-24 PROCEDURE — 45385 COLONOSCOPY W/LESION REMOVAL: CPT | Performed by: SURGERY

## 2022-06-24 PROCEDURE — 250N000009 HC RX 250: Performed by: SURGERY

## 2022-06-24 PROCEDURE — 258N000003 HC RX IP 258 OP 636: Performed by: SURGERY

## 2022-06-24 RX ORDER — LIDOCAINE 40 MG/G
CREAM TOPICAL
Status: DISCONTINUED | OUTPATIENT
Start: 2022-06-24 | End: 2022-06-24 | Stop reason: HOSPADM

## 2022-06-24 RX ORDER — SODIUM CHLORIDE, SODIUM LACTATE, POTASSIUM CHLORIDE, CALCIUM CHLORIDE 600; 310; 30; 20 MG/100ML; MG/100ML; MG/100ML; MG/100ML
INJECTION, SOLUTION INTRAVENOUS CONTINUOUS
Status: DISCONTINUED | OUTPATIENT
Start: 2022-06-24 | End: 2022-06-24 | Stop reason: HOSPADM

## 2022-06-24 RX ORDER — GLYCOPYRROLATE 0.2 MG/ML
INJECTION, SOLUTION INTRAMUSCULAR; INTRAVENOUS PRN
Status: DISCONTINUED | OUTPATIENT
Start: 2022-06-24 | End: 2022-06-24

## 2022-06-24 RX ORDER — PROPOFOL 10 MG/ML
INJECTION, EMULSION INTRAVENOUS PRN
Status: DISCONTINUED | OUTPATIENT
Start: 2022-06-24 | End: 2022-06-24

## 2022-06-24 RX ORDER — LIDOCAINE HYDROCHLORIDE 10 MG/ML
INJECTION, SOLUTION EPIDURAL; INFILTRATION; INTRACAUDAL; PERINEURAL PRN
Status: DISCONTINUED | OUTPATIENT
Start: 2022-06-24 | End: 2022-06-24

## 2022-06-24 RX ORDER — PROPOFOL 10 MG/ML
INJECTION, EMULSION INTRAVENOUS CONTINUOUS PRN
Status: DISCONTINUED | OUTPATIENT
Start: 2022-06-24 | End: 2022-06-24

## 2022-06-24 RX ADMIN — PROPOFOL 150 MCG/KG/MIN: 10 INJECTION, EMULSION INTRAVENOUS at 08:45

## 2022-06-24 RX ADMIN — PROPOFOL 50 MG: 10 INJECTION, EMULSION INTRAVENOUS at 08:45

## 2022-06-24 RX ADMIN — LIDOCAINE HYDROCHLORIDE 50 MG: 10 INJECTION, SOLUTION EPIDURAL; INFILTRATION; INTRACAUDAL; PERINEURAL at 08:45

## 2022-06-24 RX ADMIN — GLYCOPYRROLATE 0.2 MG: 0.2 INJECTION, SOLUTION INTRAMUSCULAR; INTRAVENOUS at 08:45

## 2022-06-24 RX ADMIN — LIDOCAINE HYDROCHLORIDE 0.1 ML: 10 INJECTION, SOLUTION EPIDURAL; INFILTRATION; INTRACAUDAL; PERINEURAL at 08:24

## 2022-06-24 RX ADMIN — SODIUM CHLORIDE, POTASSIUM CHLORIDE, SODIUM LACTATE AND CALCIUM CHLORIDE: 600; 310; 30; 20 INJECTION, SOLUTION INTRAVENOUS at 08:25

## 2022-06-24 RX ADMIN — PROPOFOL 50 MG: 10 INJECTION, EMULSION INTRAVENOUS at 08:49

## 2022-06-24 NOTE — ANESTHESIA POSTPROCEDURE EVALUATION
Patient: China Mckenzie    Procedure: Procedure(s):  COLONOSCOPY, FLEXIBLE, WITH LESION REMOVAL USING SNARE  COLONOSCOPY, WITH POLYPECTOMY AND BIOPSY       Anesthesia Type:  General    Note:  Disposition: Outpatient   Postop Pain Control: Uneventful            Sign Out: Well controlled pain   PONV: No   Neuro/Psych: Uneventful            Sign Out: Acceptable/Baseline neuro status   Airway/Respiratory: Uneventful            Sign Out: Acceptable/Baseline resp. status   CV/Hemodynamics: Uneventful            Sign Out: Acceptable CV status; No obvious hypovolemia; No obvious fluid overload   Other NRE: NONE   DID A NON-ROUTINE EVENT OCCUR? No           Last vitals:  Vitals:    06/24/22 0741   BP: 113/54   Pulse: 51   Temp: 36.9  C (98.4  F)   SpO2: 99%       Electronically Signed By: LARISA Cazares CRNA  June 24, 2022  9:08 AM
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
## 2022-06-24 NOTE — ANESTHESIA CARE TRANSFER NOTE
Patient: China Mckenzie    Procedure: Procedure(s):  COLONOSCOPY, FLEXIBLE, WITH LESION REMOVAL USING SNARE  COLONOSCOPY, WITH POLYPECTOMY AND BIOPSY       Diagnosis: Family history of colon cancer [Z80.0]  Colon cancer screening [Z12.11]  Diagnosis Additional Information: No value filed.    Anesthesia Type:   General     Note:    Oropharynx: spontaneously breathing  Level of Consciousness: drowsy  Oxygen Supplementation: nasal cannula  Level of Supplemental Oxygen (L/min / FiO2): 2    Dentition: dentition unchanged  Vital Signs Stable: post-procedure vital signs reviewed and stable  Report to RN Given: handoff report given  Patient transferred to: Phase II    Handoff Report: Identifed the Patient, Identified the Reponsible Provider, Reviewed the pertinent medical history, Discussed the surgical course, Reviewed Intra-OP anesthesia mangement and issues during anesthesia, Set expectations for post-procedure period and Allowed opportunity for questions and acknowledgement of understanding      Vitals:  Vitals Value Taken Time   BP     Temp     Pulse     Resp     SpO2         Electronically Signed By: LARISA Cazares CRNA  June 24, 2022  9:08 AM

## 2022-06-24 NOTE — LETTER
China Mckenzie  [address] [place] [address]    June 28, 2022    Dear China,  This letter is written to inform you of the results of your recent colonoscopy.  Your examination showed polyp(s) in your cecum, transverse colon and rectum. All polyps were removed in their entirety and sent for review by a pathologist. As you will see on the pathology report below, the tissue(s) were tubular adenomatous polyps and normal colon tissue. Your examination was otherwise without abnormality.    Final Diagnosis   A. Colon, cecum, endoscopic polypectomy--                                      - Fragments of tubular adenoma                         - No evidence of high-grade dysplasia or malignancy     B. Colon, hepatic flexure, endoscopic polypectomy--          - Fragments of tubular adenoma                         - No evidence of high-grade dysplasia or malignancy     C. Colon, rectum, endoscopic polypectomy--                                    - Polypoid colonic mucosa without diagnostic abnormality          - No evidence of adenomatous change or malignancy       Adenomatous polyps are entirely benign (non-cancerous); however, patients who have developed these polyps are at an increased risk for developing additional polyps in the future. If these are not eventually removed, there is a risk of developing colon cancer. We will advise more frequent examinations with you because of the risk associated with this type of polyp.    Given these findings, your personal history of adenomatous polyps, I recommend that you undergo a repeat colonoscopy in 5 year(s) for surveillance. We will enter you into a recall system so you receive a reminder closer to the time that you are due for repeat examination.     Please remember that this recommendation is made with the understanding that you are not experiencing persistent changes in bowel function, bleeding per rectum, and/or significant abdominal pain. If you experience  these symptoms, please contact your primary care provider for a further evaluation.     If you have any questions or concerns about the results of your colonoscopy or the appropriate follow-up, please contact my assistant at [phone].    Sincerely,        [facility],   [facility]  ___

## 2022-06-27 ENCOUNTER — TELEPHONE (OUTPATIENT)
Dept: FAMILY MEDICINE | Facility: CLINIC | Age: 62
End: 2022-06-27

## 2022-06-27 LAB
PATH REPORT.COMMENTS IMP SPEC: NORMAL
PATH REPORT.COMMENTS IMP SPEC: NORMAL
PATH REPORT.FINAL DX SPEC: NORMAL
PATH REPORT.GROSS SPEC: NORMAL
PATH REPORT.MICROSCOPIC SPEC OTHER STN: NORMAL
PATH REPORT.RELEVANT HX SPEC: NORMAL
PHOTO IMAGE: NORMAL

## 2022-06-27 PROCEDURE — 88305 TISSUE EXAM BY PATHOLOGIST: CPT | Mod: 26 | Performed by: PATHOLOGY

## 2022-06-27 NOTE — TELEPHONE ENCOUNTER
"Reason for call:  Patient reporting a symptom    Symptom or request: Pt states that she \"just rec'd\" a letter with her results from Dr. Mckenzie that is dated 5/23.  Pt is very concerned about her chronic kidney disease diagnosis.  I re-read the letter to the patient that explained the results and pt has many questions and is very upset.  Please call patient and advise.      Duration (how long have symptoms been present): ongong    Have you been treated for this before? Yes    Additional comments:     Phone Number patient can be reached at:  Home number on file [phone] (home)    Best Time:  any    Can we leave a detailed message on this number:  YES    Call taken on 6/27/2022 at 8:25 AM by Gloria Johnson    "

## 2022-06-27 NOTE — TELEPHONE ENCOUNTER
Writer called patient anddiscussed CKD3.  Writer informed patient that many people develop less efficient kidneys as they age and that the patient can still consdier herself health  Writer and patient reviewed diet, caffeine intake, energy drinks, lifestyle changes and patient stated understanding and was able to teach back.    Patient states that she is going to stop drinking her daily [employer] Drink and that her kidney numbers will be better again at her next annual visit.    Patient stated that she feels much better already.    Luciano SHELDON [facility]

## 2022-06-29 NOTE — TELEPHONE ENCOUNTER
"Writer called patient.  Writer did describe patient's condition as \"mildly low functioning kidneys\" and instructed patient that this was apparent last year too and has remained stable.  Patient says that helps.  Patient is still anxious and asking about a second opinion.  Patient references her family input as the main motivator for her to get the second opinion and then exhibits significant anxiety about this on her own behalf  Patient states \"I never drank or anything, I drink one monster a day for a couple years and my kidneys are shot?\" then she goes on to talk abotu what she might do to make this better.  Writer is not sure patient is processing fully  Patient is kind and agreeable and worried about her kidneys  Patient agreed to come in and talk to another physician in our [facility] about her kidney labs for a kidney lab follow up and to get another physician's opinion/interpretation.    Patient was scheduled with Dr Cristino Mazariegos 7/8/22   "

## 2022-06-29 NOTE — TELEPHONE ENCOUNTER
"This is not an indication for referral to nephrology, it is just something we monitor.    Here is further information that can be sent to pt.     It should be noted that the mildly low functioning kidneys has been stable over the past 2 years and is not completely \"new\".     Heather Mckenzie M.D.      What is Chronic Kidney Disease  Chronic kidney disease (CKD) is the permanent loss of kidney function.  When the kidneys are damaged, they do not remove wastes and extra water from the blood as well as they should.  The most common causes of CKD are high blood pressure and diabetes.   It can also run in families, so you may be at higher risk if you have a blood relative with kidney failure.  CKD is a silent condition (having few or no symptoms) and develops so slowly that most people do not realize they are sick until the disease is advanced.  Left undetected and untreated CKD can eventually lead to further problems including hypertension, anemia, weak bones, poor nutrition, nerve damage, and in severe cases kidney failure (requiring dialysis or transplant).  CKD also increases a patient s risk for developing diseases of the heart and blood vessels.    We can diagnose CKD through blood and urine tests.  By detecting CKD in its early stages we can prevent or delay the complications of CKD, including kidney failure and heart disease.  Stages of CKD:  There are five stages of chronic kidney disease.  Your stage of kidney damage is based on the presence of kidney damage (often in the form of urinary proteins) and your glomerular filtration rate (GFR), which is a measure of your level of kidney function.  Things you can do to slow down or avoid kidney failure:  If you have diabetes, control your blood sugar.  Studies show that keeping tight control of blood sugar can delay or prevent kidney failure  If you have high blood pressure, it is important to lower your blood pressure.  Medications called ACE (angiotensin-converting " enzyme) inhibitors or ARBs (angiotensin receptor blockers) are used to keep your kidney disease from getting worse.  Be active by including exercise in your daily routine.  Eat a well balanced diet.   We may have you watch the amount of protein you eat.  Too much protein can make the kidneys work too hard.  Quit smoking.  Smoking damages the kidneys.  It also raises blood pressure.  Discuss all of your medications, including over-the-counter medications, with your doctor.  You should  avoid certain medications, including popular medications such as Advil, Motrin, Aleve (and other  NSAIDs ) which can further damage your kidneys.  For more information on Chronic Kidney Disease, please see the National Kidney Foundation www.kidney.org.

## 2022-06-29 NOTE — TELEPHONE ENCOUNTER
"See RN's note below.    Pt calling complaining that she is still upset about her kidney function lab test results.  \"They just have to be wrong.  I am completely healthy, I can't have kidney disease, I only drink water and milk.\"  She states that her siblings are telling her that she needs a 2nd opinion.  Pt wants referral to kidney specialist.  Please call patient and advise.        "

## 2022-07-06 DIAGNOSIS — M12.9 ARTHROPATHY: ICD-10-CM

## 2022-07-06 DIAGNOSIS — G89.4 CHRONIC PAIN SYNDROME: ICD-10-CM

## 2022-07-06 RX ORDER — HYDROCODONE BITARTRATE AND ACETAMINOPHEN 5; 325 MG/1; MG/1
1 TABLET ORAL 2 TIMES DAILY PRN
Qty: 60 TABLET | Refills: 0 | Status: SHIPPED | OUTPATIENT
Start: 2022-07-14 | End: 2022-08-17

## 2022-07-06 NOTE — TELEPHONE ENCOUNTER
PDMP reviewed.  Last fill date 6/15    Post dated prescription for 7/14 (29 days from last fill).    Max per month #60.    Heather Mckenzie M.D.

## 2022-07-06 NOTE — TELEPHONE ENCOUNTER
Pending Prescriptions:                       Disp   Refills    HYDROcodone-acetaminophen (NORCO) 5-325 M*60 tab*0            Sig: Take 1 tablet by mouth 2 times daily as needed           for severe pain    Routing refill request to provider for review/approval because:  Drug not on the G refill protocol       Keny Fine RN

## 2022-07-08 ENCOUNTER — OFFICE VISIT (OUTPATIENT)
Dept: FAMILY MEDICINE | Facility: CLINIC | Age: 62
End: 2022-07-08
Payer: COMMERCIAL

## 2022-07-08 VITALS
HEART RATE: 59 BPM | SYSTOLIC BLOOD PRESSURE: 110 MMHG | HEIGHT: 55 IN | DIASTOLIC BLOOD PRESSURE: 60 MMHG | TEMPERATURE: 98.3 F | WEIGHT: 102 LBS | OXYGEN SATURATION: 98 % | RESPIRATION RATE: 14 BRPM | BODY MASS INDEX: 23.61 KG/M2

## 2022-07-08 DIAGNOSIS — N18.31 STAGE 3A CHRONIC KIDNEY DISEASE (H): Primary | ICD-10-CM

## 2022-07-08 PROCEDURE — 99213 OFFICE O/P EST LOW 20 MIN: CPT | Performed by: FAMILY MEDICINE

## 2022-07-08 ASSESSMENT — ANXIETY QUESTIONNAIRES
5. BEING SO RESTLESS THAT IT IS HARD TO SIT STILL: NOT AT ALL
1. FEELING NERVOUS, ANXIOUS, OR ON EDGE: NOT AT ALL
IF YOU CHECKED OFF ANY PROBLEMS ON THIS QUESTIONNAIRE, HOW DIFFICULT HAVE THESE PROBLEMS MADE IT FOR YOU TO DO YOUR WORK, TAKE CARE OF THINGS AT HOME, OR GET ALONG WITH OTHER PEOPLE: NOT DIFFICULT AT ALL
GAD7 TOTAL SCORE: 1
GAD7 TOTAL SCORE: 1
4. TROUBLE RELAXING: NOT AT ALL
1. FEELING NERVOUS, ANXIOUS, OR ON EDGE: NOT AT ALL
7. FEELING AFRAID AS IF SOMETHING AWFUL MIGHT HAPPEN: NOT AT ALL
3. WORRYING TOO MUCH ABOUT DIFFERENT THINGS: SEVERAL DAYS
2. NOT BEING ABLE TO STOP OR CONTROL WORRYING: NOT AT ALL
2. NOT BEING ABLE TO STOP OR CONTROL WORRYING: NOT AT ALL
3. WORRYING TOO MUCH ABOUT DIFFERENT THINGS: SEVERAL DAYS
6. BECOMING EASILY ANNOYED OR IRRITABLE: NOT AT ALL

## 2022-07-08 ASSESSMENT — PAIN SCALES - GENERAL: PAINLEVEL: NO PAIN (0)

## 2022-07-08 NOTE — PROGRESS NOTES
"  Assessment & Plan     Stage 3a chronic kidney disease (H)  Reviewed importance of keeping healthy, watching blood pressure and blood sugars. At least yearly checking labs. Reassured that she has very mild kidney disease. Patient is inquiring about more information on dietary changes, Nutrition referral placed.   - Nutrition Referral    20 minutes spent on the date of the encounter doing patient visit and documentation        Declined COVID and shingles vaccine    No follow-ups on file.    LORETTA ORLANDO [facility]    Devon Atkinson is a 61 year old, presenting for the following health issues:  Consult (Patient had lab work completed in May and was told she has stage 3 kidney disease and would like to discuss plan and future treatments and why/how she developed this. )      HPI     Chief Complaint   Patient presents with     Consult     Patient had lab work completed in May and was told she has stage 3 kidney disease and would like to discuss plan and future treatments and why/how she developed this.      Patient is wondering prognosis and if there is anything she can do to prevent progression.   Reports has been being more active with 2 new puppies.       Review of Systems   Constitutional, HEENT, cardiovascular, pulmonary, gi and gu systems are negative, except as otherwise noted.      Objective    /60   Pulse 59   Temp 98.3  F (36.8  C) (Tympanic)   Resp 14   Ht 1.397 m (4' 7\")   Wt 46.3 kg (102 lb)   SpO2 98%   Breastfeeding No   BMI 23.71 kg/m    Body mass index is 23.71 kg/m .  Physical Exam  Constitutional:       General: She is not in acute distress.     Appearance: She is well-developed.   HENT:      Right Ear: Tympanic membrane and external ear normal.      Left Ear: Tympanic membrane and external ear normal.      Nose: Nose normal.      Mouth/Throat:      Pharynx: No oropharyngeal exudate.   Eyes:      General:         Right eye: No discharge.       "   Left eye: No discharge.      Conjunctiva/sclera: Conjunctivae normal.      Pupils: Pupils are equal, round, and reactive to light.   Neck:      Thyroid: No thyromegaly.      Trachea: No tracheal deviation.   Cardiovascular:      Rate and Rhythm: Normal rate and regular rhythm.      Pulses: Normal pulses.      Heart sounds: Normal heart sounds, S1 normal and S2 normal. No murmur heard.    No friction rub. No S3 or S4 sounds.   Pulmonary:      Effort: Pulmonary effort is normal. No respiratory distress.      Breath sounds: Normal breath sounds. No wheezing or rales.   Abdominal:      General: Bowel sounds are normal.      Palpations: Abdomen is soft. There is no mass.      Tenderness: There is no abdominal tenderness.   Musculoskeletal:         General: Normal range of motion.      Cervical back: Neck supple.   Lymphadenopathy:      Cervical: No cervical adenopathy.   Skin:     General: Skin is warm and dry.      Findings: No rash.   Neurological:      Mental Status: She is alert and oriented to person, place, and time.      Motor: No abnormal muscle tone.      Deep Tendon Reflexes: Reflexes are normal and symmetric.   Psychiatric:         Thought Content: Thought content normal.         Judgment: Judgment normal.                    .  ..

## 2022-07-08 NOTE — PATIENT INSTRUCTIONS
Our Clinic hours are:  Mondays    7:20 am - 7 pm  Tues -  Fri  7:20 am - 5 pm    Clinic Phone: [phone]    The clinic lab opens at 7:30 am Mon - Fri and appointments are required.    [facility]. [phone]  Monday  8 am - 7pm  Tues - Fri 8 am - 5:30 pm

## 2022-08-16 ENCOUNTER — TELEPHONE (OUTPATIENT)
Dept: FAMILY MEDICINE | Facility: CLINIC | Age: 62
End: 2022-08-16

## 2022-08-16 DIAGNOSIS — G89.4 CHRONIC PAIN SYNDROME: ICD-10-CM

## 2022-08-16 DIAGNOSIS — M12.9 ARTHROPATHY: ICD-10-CM

## 2022-08-17 RX ORDER — HYDROCODONE BITARTRATE AND ACETAMINOPHEN 5; 325 MG/1; MG/1
1 TABLET ORAL 2 TIMES DAILY PRN
Qty: 60 TABLET | Refills: 0 | Status: SHIPPED | OUTPATIENT
Start: 2022-08-17 | End: 2022-09-16

## 2022-08-17 NOTE — TELEPHONE ENCOUNTER
Writer called patient and informed her of the visit.    Luciano SHELDON [facility]

## 2022-08-17 NOTE — TELEPHONE ENCOUNTER
Notify patient that refill was done. Per our controlled substance agreement, she needs a visit with me every 3 months - last visit 5/20/2022. She should schedule now to be seen in 1 month for refills.     Heather Mckenzie M.D.

## 2022-09-16 ENCOUNTER — OFFICE VISIT (OUTPATIENT)
Dept: FAMILY MEDICINE | Facility: CLINIC | Age: 62
End: 2022-09-16
Payer: COMMERCIAL

## 2022-09-16 VITALS
SYSTOLIC BLOOD PRESSURE: 121 MMHG | WEIGHT: 102 LBS | HEART RATE: 52 BPM | TEMPERATURE: 98.1 F | OXYGEN SATURATION: 96 % | DIASTOLIC BLOOD PRESSURE: 71 MMHG | RESPIRATION RATE: 14 BRPM | BODY MASS INDEX: 23.61 KG/M2 | HEIGHT: 55 IN

## 2022-09-16 DIAGNOSIS — F41.1 GENERALIZED ANXIETY DISORDER: Primary | ICD-10-CM

## 2022-09-16 DIAGNOSIS — M12.9 ARTHROPATHY: ICD-10-CM

## 2022-09-16 DIAGNOSIS — G89.4 CHRONIC PAIN SYNDROME: ICD-10-CM

## 2022-09-16 DIAGNOSIS — N95.2 ATROPHIC VAGINITIS: ICD-10-CM

## 2022-09-16 PROCEDURE — 99214 OFFICE O/P EST MOD 30 MIN: CPT | Performed by: FAMILY MEDICINE

## 2022-09-16 RX ORDER — ESTRADIOL 0.5 MG/1
0.5 TABLET ORAL
Qty: 24 TABLET | Refills: 3 | Status: SHIPPED | OUTPATIENT
Start: 2022-09-19 | End: 2023-09-13

## 2022-09-16 RX ORDER — HYDROCODONE BITARTRATE AND ACETAMINOPHEN 5; 325 MG/1; MG/1
1 TABLET ORAL 2 TIMES DAILY PRN
Qty: 60 TABLET | Refills: 0 | Status: SHIPPED | OUTPATIENT
Start: 2022-11-15 | End: 2022-12-20

## 2022-09-16 RX ORDER — HYDROCODONE BITARTRATE AND ACETAMINOPHEN 5; 325 MG/1; MG/1
1 TABLET ORAL 2 TIMES DAILY PRN
Qty: 60 TABLET | Refills: 0 | Status: SHIPPED | OUTPATIENT
Start: 2022-09-16 | End: 2023-01-06

## 2022-09-16 RX ORDER — FLUOXETINE 40 MG/1
40 CAPSULE ORAL DAILY
Qty: 90 CAPSULE | Refills: 1 | Status: SHIPPED | OUTPATIENT
Start: 2022-09-16 | End: 2024-01-22

## 2022-09-16 RX ORDER — HYDROCODONE BITARTRATE AND ACETAMINOPHEN 5; 325 MG/1; MG/1
1 TABLET ORAL 2 TIMES DAILY PRN
Qty: 60 TABLET | Refills: 0 | Status: SHIPPED | OUTPATIENT
Start: 2022-10-15 | End: 2023-01-06

## 2022-09-16 ASSESSMENT — PAIN SCALES - GENERAL: PAINLEVEL: NO PAIN (0)

## 2022-09-16 ASSESSMENT — ANXIETY QUESTIONNAIRES
GAD7 TOTAL SCORE: 3
4. TROUBLE RELAXING: SEVERAL DAYS
1. FEELING NERVOUS, ANXIOUS, OR ON EDGE: SEVERAL DAYS
GAD7 TOTAL SCORE: 3
IF YOU CHECKED OFF ANY PROBLEMS ON THIS QUESTIONNAIRE, HOW DIFFICULT HAVE THESE PROBLEMS MADE IT FOR YOU TO DO YOUR WORK, TAKE CARE OF THINGS AT HOME, OR GET ALONG WITH OTHER PEOPLE: NOT DIFFICULT AT ALL
8. IF YOU CHECKED OFF ANY PROBLEMS, HOW DIFFICULT HAVE THESE MADE IT FOR YOU TO DO YOUR WORK, TAKE CARE OF THINGS AT HOME, OR GET ALONG WITH OTHER PEOPLE?: NOT DIFFICULT AT ALL
7. FEELING AFRAID AS IF SOMETHING AWFUL MIGHT HAPPEN: NOT AT ALL
5. BEING SO RESTLESS THAT IT IS HARD TO SIT STILL: SEVERAL DAYS
7. FEELING AFRAID AS IF SOMETHING AWFUL MIGHT HAPPEN: NOT AT ALL
GAD7 TOTAL SCORE: 3
3. WORRYING TOO MUCH ABOUT DIFFERENT THINGS: NOT AT ALL
2. NOT BEING ABLE TO STOP OR CONTROL WORRYING: NOT AT ALL
6. BECOMING EASILY ANNOYED OR IRRITABLE: NOT AT ALL

## 2022-09-16 NOTE — PROGRESS NOTES
"  Assessment & Plan     Generalized anxiety disorder  Stable, well controlled  - FLUoxetine (PROZAC) 40 MG capsule; Take 1 capsule (40 mg) by mouth daily    Arthropathy     - HYDROcodone-acetaminophen (NORCO) 5-325 MG tablet; Take 1 tablet by mouth 2 times daily as needed for severe pain  - HYDROcodone-acetaminophen (NORCO) 5-325 MG tablet; Take 1 tablet by mouth 2 times daily as needed for severe pain  - HYDROcodone-acetaminophen (NORCO) 5-325 MG tablet; Take 1 tablet by mouth 2 times daily as needed for severe pain  - FLUoxetine (PROZAC) 40 MG capsule; Take 1 capsule (40 mg) by mouth daily    Chronic pain syndrome   CSA up to date, UDS up to date  - HYDROcodone-acetaminophen (NORCO) 5-325 MG tablet; Take 1 tablet by mouth 2 times daily as needed for severe pain  - HYDROcodone-acetaminophen (NORCO) 5-325 MG tablet; Take 1 tablet by mouth 2 times daily as needed for severe pain  - HYDROcodone-acetaminophen (NORCO) 5-325 MG tablet; Take 1 tablet by mouth 2 times daily as needed for severe pain    Atrophic vaginitis   change to estradiol (vagifem gets \"stuck in the applicator\")  - estradiol (ESTRACE) 0.5 MG tablet; Place 1 tablet (0.5 mg) vaginally twice a week                 No follow-ups on file.    Heather Mckenzie MD  [facility]   China is a 61 year old, presenting for the following health issues:  Anxiety      History of Present Illness       CKD: She is not using over the counter pain medicine.     She eats 0-1 servings of fruits and vegetables daily.She consumes 0 sweetened beverage(s) daily.She exercises with enough effort to increase her heart rate 9 or less minutes per day.    She is taking medications regularly.  Today's RON-7 Score: 3       Acute Illness   Acute illness concerns: Cough, sore throat  Onset: 2 days    Fever: no    Chills/Sweats: YES- chiils    Headache (location?): no    Sinus Pressure:no    Conjunctivitis:  no    Ear Pain: no    Rhinorrhea: " no    Congestion: no    Sore Throat: YES     Cough: YES-non-productive    Wheeze: no    Decreased Appetite: YES    Nausea: no    Vomiting: no    Diarrhea:  YES    Dysuria/Freq.: no    Fatigue/Achiness: YES    Sick/Strep Exposure: YES-  and his friend     Therapies Tried and outcome: Nyquil, cough drops      - She notes that she does not know how to use estradiol and medication gets stuck in the tube    Anxiety Follow-Up  Prozac 40mg qd    How are you doing with your anxiety since your last visit? No change    Are you having other symptoms that might be associated with anxiety? No    Have you had a significant life event? Recent dx of stage 3 kidney disease     Are you feeling depressed? No    Do you have any concerns with your use of alcohol or other drugs? No    Social History     Tobacco Use     Smoking status: Never Smoker     Smokeless tobacco: Never Used   Vaping Use     Vaping Use: Never used   Substance Use Topics     Alcohol use: Yes     Comment: rare     Drug use: No     RON-7 SCORE 5/20/2022 7/8/2022 9/16/2022   Total Score - - -   Total Score 5 (mild anxiety) - 3 (minimal anxiety)   Total Score 5 1 3     PHQ 7/29/2020 5/10/2021 2/10/2022   PHQ-9 Total Score 11 0 0   Q9: Thoughts of better off dead/self-harm past 2 weeks Not at all Not at all Not at all         Migraine     Since your last clinic visit, how have your headaches changed?  No change    How often are you getting headaches or migraines? Depending on month     Are you able to do normal daily activities when you have a migraine? Depending on what weather is like    Are you taking rescue/relief medications? (Select all that apply) sumatriptan (Imitrex)    How helpful is your rescue/relief medication?  I get total relief    Are you taking any medications to prevent migraines? (Select all that apply)  Inderal and Topamax    In the past 4 weeks, how often have you gone to urgent care or the emergency room because of your headaches?   "0      Pain History:  When did you first notice your pain? - Chronic Pain   Have you seen this provider for your pain in the past?   Yes   Where in your body do you have pain? Whole body  Are you seeing anyone else for your pain? No    PHQ-9 SCORE 7/29/2020 5/10/2021 2/10/2022   PHQ-9 Total Score - - -   PHQ-9 Total Score 11 0 0       RON-7 SCORE 5/20/2022 7/8/2022 9/16/2022   Total Score - - -   Total Score 5 (mild anxiety) - 3 (minimal anxiety)   Total Score 5 1 3           PHQ-9 SCORE 7/29/2020 5/10/2021 2/10/2022   PHQ-9 Total Score - - -   PHQ-9 Total Score 11 0 0     RON-7 SCORE 5/20/2022 7/8/2022 9/16/2022   Total Score - - -   Total Score 5 (mild anxiety) - 3 (minimal anxiety)   Total Score 5 1 3       Chronic Pain Follow Up:    Location of pain: Whole body  Analgesia/pain control:    - Recent changes:  None    - Overall control: Comfortably manageable    - Current treatments: Opioids and muscle relaxers   Adherence:     - Do you ever take more pain medicine than prescribed? No    - When did you take your last dose of pain medicine?  2 days ago   Adverse effects: No   PDMP Review       Value Time User    State PDMP site checked  Yes 9/16/2022 11:10 AM Heather Mkcenzie MD        Last CSA Agreement:   CSA -- Patient Level:    Controlled Substance Agreement - Opioid - Scan on 2/14/2022 10:07 AM  Controlled Substance Agreement - Opioid - Scan on 2/11/2022  9:26 AM  Controlled Substance Agreement - Opioid - Scan on 5/27/2021  2:04 PM: opioid consent       Last UDS: 6/3/2022            Review of Systems   Constitutional, HEENT, cardiovascular, pulmonary, gi and gu systems are negative, except as otherwise noted.      Objective    /71   Pulse 52   Temp 98.1  F (36.7  C) (Tympanic)   Resp 14   Ht 1.397 m (4' 7\")   Wt 46.3 kg (102 lb)   SpO2 96%   BMI 23.71 kg/m    Body mass index is 23.71 kg/m .  Physical Exam   GENERAL: healthy, alert and no distress  NECK: no adenopathy, no asymmetry, masses, or scars " and thyroid normal to palpation  RESP: lungs clear to auscultation - no rales, rhonchi or wheezes  CV: regular rate and rhythm, normal S1 S2, no S3 or S4, no murmur, click or rub, no peripheral edema and peripheral pulses strong  MS: no gross musculoskeletal defects noted, no edema  PSYCH: mentation appears normal, affect normal/bright

## 2022-09-28 ENCOUNTER — TELEPHONE (OUTPATIENT)
Dept: FAMILY MEDICINE | Facility: CLINIC | Age: 62
End: 2022-09-28

## 2022-09-28 DIAGNOSIS — G43.009 MIGRAINE WITHOUT AURA AND WITHOUT STATUS MIGRAINOSUS, NOT INTRACTABLE: ICD-10-CM

## 2022-09-28 NOTE — TELEPHONE ENCOUNTER
Had cough/sore throat when she saw me.    ?drinking enough fluids  Did symptoms resolve for the upper respiratory infection?    Need more information - what has she tried for the headaches?        If headaches were previously well controlled I wouldn't be quick to make changes to her headache prophylactic medications yet.    Heather Mckenzie M.D.

## 2022-09-28 NOTE — TELEPHONE ENCOUNTER
Patient states she has had migraines everyday since her visit with you 9/16. Wondering if she needs a dosage change? Patient uses [facility].    Thank you,    Lupe Wade, JUNI Neumann

## 2022-09-28 NOTE — TELEPHONE ENCOUNTER
"If patient states that she gets the headaches at night after work at about 430 pm.  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  Patient was having the headaches when she saw Dr Mckenzie too, she forgot to say anything at appointments  Patient states that she drinks a \"ton\" of water. Patient states that her nickname is \"Camel\" among her friends.    To try to manage headaches:  If patient takes imitrex immediately sometimes the headache goes away  Most of the times the patient takes imitrex, lies down in the dark, and later ends up taking a second imitrex.  Patient also tried hot and cold packs on her head, she says sometimes hot feels good and other times cold feels good  Patient tries eating as this seems to help her headaches feel better sometimes  Patient does not take acetaminophen on top of imitrex because she is scared of hurting her kidneys  Patient does not touch ibuprofen    Patient states she only does what Dr Mckenzie tells her to do.  Patient is asking if Dr Mckenzie has any other ideas on how to help patient's chronic recurring migraines.    Routed to Dr Jonah Wolf RN [facility]        "

## 2022-09-29 ENCOUNTER — TELEPHONE (OUTPATIENT)
Dept: FAMILY MEDICINE | Facility: CLINIC | Age: 62
End: 2022-09-29

## 2022-09-29 RX ORDER — TOPIRAMATE 100 MG/1
150 TABLET, FILM COATED ORAL DAILY
Qty: 135 TABLET | Refills: 3 | Status: SHIPPED | OUTPATIENT
Start: 2022-09-29 | End: 2023-09-13

## 2022-09-29 NOTE — TELEPHONE ENCOUNTER
Writer informed patient to increase Topamax to 150 mg daily. Take 1 1/2  Tabs daily.    New RX sent to pharmacy.    Carolann SHELDON

## 2022-09-29 NOTE — TELEPHONE ENCOUNTER
Increase Topamax (topiramate) to 150 mg daily - take 1.5 of the 100 mg tablets.    New prescription sent to pharmacy.    Heather Mckenzie M.D.

## 2023-01-03 ENCOUNTER — TELEPHONE (OUTPATIENT)
Dept: FAMILY MEDICINE | Facility: CLINIC | Age: 63
End: 2023-01-03

## 2023-01-03 NOTE — TELEPHONE ENCOUNTER
Writer called patient.  Patient was scheduled with Dr Mckenzie on same day appt to discuss migraines.    Luciano SHELDON [facility]

## 2023-01-03 NOTE — TELEPHONE ENCOUNTER
Reason for call:  Patient reporting a symptom    Symptom or request: Pt has had headaches on and off for weeks now and none of her migraine meds are working.  Please call patient and advise.      Duration (how long have symptoms been present): ongoing    Have you been treated for this before? Yes    Additional comments:     Phone Number patient can be reached at:  Home number on file [phone] (home)    Best Time:  any    Can we leave a detailed message on this number:  YES    Call taken on 1/3/2023 at 10:06 AM by Gloria Johnson

## 2023-01-06 ENCOUNTER — OFFICE VISIT (OUTPATIENT)
Dept: FAMILY MEDICINE | Facility: CLINIC | Age: 63
End: 2023-01-06
Payer: COMMERCIAL

## 2023-01-06 VITALS
OXYGEN SATURATION: 96 % | BODY MASS INDEX: 24.3 KG/M2 | HEIGHT: 55 IN | HEART RATE: 55 BPM | WEIGHT: 105 LBS | DIASTOLIC BLOOD PRESSURE: 60 MMHG | SYSTOLIC BLOOD PRESSURE: 110 MMHG | TEMPERATURE: 97.4 F | RESPIRATION RATE: 14 BRPM

## 2023-01-06 DIAGNOSIS — F11.90 CHRONIC, CONTINUOUS USE OF OPIOIDS: ICD-10-CM

## 2023-01-06 DIAGNOSIS — G43.009 MIGRAINE WITHOUT AURA AND WITHOUT STATUS MIGRAINOSUS, NOT INTRACTABLE: Primary | ICD-10-CM

## 2023-01-06 DIAGNOSIS — N18.31 STAGE 3A CHRONIC KIDNEY DISEASE (H): ICD-10-CM

## 2023-01-06 PROCEDURE — 99214 OFFICE O/P EST MOD 30 MIN: CPT | Performed by: FAMILY MEDICINE

## 2023-01-06 RX ORDER — SUMATRIPTAN 100 MG/1
TABLET, FILM COATED ORAL
Qty: 18 TABLET | Refills: 11 | Status: SHIPPED | OUTPATIENT
Start: 2023-01-06 | End: 2024-02-21

## 2023-01-06 ASSESSMENT — ANXIETY QUESTIONNAIRES
GAD7 TOTAL SCORE: 0
7. FEELING AFRAID AS IF SOMETHING AWFUL MIGHT HAPPEN: NOT AT ALL
2. NOT BEING ABLE TO STOP OR CONTROL WORRYING: NOT AT ALL
5. BEING SO RESTLESS THAT IT IS HARD TO SIT STILL: NOT AT ALL
3. WORRYING TOO MUCH ABOUT DIFFERENT THINGS: NOT AT ALL
4. TROUBLE RELAXING: NOT AT ALL
6. BECOMING EASILY ANNOYED OR IRRITABLE: NOT AT ALL
GAD7 TOTAL SCORE: 0
IF YOU CHECKED OFF ANY PROBLEMS ON THIS QUESTIONNAIRE, HOW DIFFICULT HAVE THESE PROBLEMS MADE IT FOR YOU TO DO YOUR WORK, TAKE CARE OF THINGS AT HOME, OR GET ALONG WITH OTHER PEOPLE: NOT DIFFICULT AT ALL
1. FEELING NERVOUS, ANXIOUS, OR ON EDGE: NOT AT ALL

## 2023-01-06 ASSESSMENT — PATIENT HEALTH QUESTIONNAIRE - PHQ9: SUM OF ALL RESPONSES TO PHQ QUESTIONS 1-9: 5

## 2023-01-06 ASSESSMENT — PAIN SCALES - GENERAL: PAINLEVEL: EXTREME PAIN (8)

## 2023-01-06 NOTE — PROGRESS NOTES
Assessment & Plan     Migraine without aura and without status migrainosus, not intractable  Would recommend neurology consultation given the worsening headaches and inability to control with two prophylactic medications    - SUMAtriptan (IMITREX) 100 MG tablet; TAKE 1 TAB AT ONSET OF MIGRAINE MAY REPEAT AFTER 2 HOURS (MAX 200MG PER 24 HOURS)  - Adult Neurology  Referral; Future    Stage 3a chronic kidney disease (H)  Has been stable  monitor    Chronic, continuous use of opioids  New CSA signed  UPT is up to date  PHQ/RON updated      No follow-ups on file.    Heather Mckenzie MD  [facility]   [place] is a 62 year old, presenting for the following health issues:  Migraine (Patient would like to discuss a increase in propranolol and discuss migraine medication. )      HPI     Migraine     Since your last clinic visit, how have your headaches changed?  Worsened    How often are you getting headaches or migraines? daily     Are you able to do normal daily activities when you have a migraine? No- spends more time in bed in a dark room    Are you taking rescue/relief medications? (Select all that apply) sumatriptan (Imitrex)    How helpful is your rescue/relief medication?  I get total relief from 2 tablets of imitrex    Are you taking any medications to prevent migraines? (Select all that apply)  Topamax, propranolol would like a dose increase    In the past 4 weeks, how often have you gone to urgent care or the emergency room because of your headaches?  2      How many servings of fruits and vegetables do you eat daily?  4 or more    On average, how many sweetened beverages do you drink each day (Examples: soda, juice, sweet tea, etc.  Do NOT count diet or artificially sweetened beverages)?   0    How many days per week do you exercise enough to make your heart beat faster? 7    How many minutes a day do you exercise enough to make your heart beat faster? 9 or  "less- due to hands and body hurting    How many days per week do you miss taking your medication? 0    C/o daily headache, taking imitrex daily for the past few weeks  Feels her stress is no more than usual  Drinking plenty fluids  Up more at night (they have puppies) so sleep is worse    Already taking both Inderal LA 60 mg and Topamax 150 mg daily          PHQ 7/29/2020 5/10/2021 2/10/2022   PHQ-9 Total Score 11 0 0   Q9: Thoughts of better off dead/self-harm past 2 weeks Not at all Not at all Not at all     RON-7 SCORE 7/8/2022 9/16/2022 1/6/2023   Total Score - - -   Total Score - 3 (minimal anxiety) -   Total Score 1 3 0           Review of Systems   Constitutional, HEENT, cardiovascular, pulmonary, gi and gu systems are negative, except as otherwise noted.      Objective    /60   Pulse 55   Temp 97.4  F (36.3  C) (Tympanic)   Resp 14   Ht 1.397 m (4' 7\")   Wt 47.6 kg (105 lb)   SpO2 96%   BMI 24.40 kg/m    Body mass index is 24.4 kg/m .  Physical Exam   GENERAL: healthy, alert and no distress  NEURO: Normal strength and tone, mentation intact and speech normal                    "

## 2023-01-06 NOTE — LETTER
Opioid / Opioid Plus Controlled Substance Agreement    This is an agreement between you and your provider about the safe and appropriate use of controlled substance/opioids prescribed by your care team. Controlled substances are medicines that can cause physical and mental dependence (abuse).    There are strict laws about having and using these medicines. We here at [facility] are committing to working with you in your efforts to get better. To support you in this work, we ll help you schedule regular office appointments for medicine refills. If we must cancel or change your appointment for any reason, we ll make sure you have enough medicine to last until your next appointment.     As a Provider, I will:    Listen carefully to your concerns and treat you with respect.     Recommend a treatment plan that I believe is in your best interest. This plan may involve therapies other than opioid pain medication.     Talk with you often about the possible benefits, and the risk of harm of any medicine that we prescribe for you.     Provide a plan on how to taper (discontinue or go off) using this medicine if the decision is made to stop its use.    As a Patient, I understand that opioid(s):     Are a controlled substance prescribed by my care team to help me function or work and manage my condition(s).     Are strong medicines and can cause serious side effects such as:    Drowsiness, which can seriously affect my driving ability    A lower breathing rate, enough to cause death    Harm to my thinking ability     Depression     Abuse of and addiction to this medicine    Need to be taken exactly as prescribed. Combining opioids with certain medicines or chemicals (such as illegal drugs, sedatives, sleeping pills, and benzodiazepines) can be dangerous or even fatal. If I stop opioids suddenly, I may have severe withdrawal symptoms.    Do not work for all types of pain nor for all patients. If they re not helpful, I may  be asked to stop them.        The risks, benefits and side effects of these medicine(s) were explained to me. I agree that:  1. I will take part in other treatments as advised by my care team. This may be psychiatry or counseling, physical therapy, behavioral therapy, group treatment or a referral to a specialist.     2. I will keep all my appointments. I understand that this is part of the monitoring of opioids. My care team may require an office visit for EVERY opioid/controlled substance refill. If I miss appointments or don t follow instructions, my care team may stop my medicine.    3. I will take my medicines as prescribed. I will not change the dose or schedule unless my care team tells me to. There will be no refills if I run out early.     4. I may be asked to come to the clinic and complete a urine drug test or complete a pill count at any time. If I don t give a urine sample or participate in a pill count, the care team may stop my medicine.    5. I will only receive prescriptions from this clinic for chronic pain. If I am treated by another provider for acute pain issues, I will tell them that I am taking opioid pain medication for chronic pain and that I have a treatment agreement with this provider. I will inform my [facility] care team within one business day if I am given a prescription for any pain medication by another healthcare provider. My [facility] care team can contact other providers and pharmacists about my use of any medicines.    6. It is up to me to make sure that I don t run out of my medicines on weekends or holidays. If my care team is willing to refill my opioid prescription without a visit, I must request refills only during office hours. Refills may take up to 3 business days to process. I will use one pharmacy to fill all my opioid and other controlled substance prescriptions. I will notify the clinic about any changes to my insurance or medication  availability.    7. I am responsible for my prescriptions. If the medicine/prescription is lost, stolen or destroyed, it will not be replaced. I also agree not to share controlled substance medicines with anyone.    8. I am aware I should not use any illegal or recreational drugs. I agree not to drink alcohol unless my care team says I can.       9. If I enroll in the [place] Medical Cannabis program, I will tell my care team prior to my next refill.     10. I will tell my care team right away if I become pregnant, have a new medical problem treated outside of my regular clinic, or have a change in my medications.    11. I understand that this medicine can affect my thinking, judgment and reaction time. Alcohol and drugs affect the brain and body, which can affect the safety of my driving. Being under the influence of alcohol or drugs can affect my decision-making, behaviors, personal safety, and the safety of others. Driving while impaired (DWI) can occur if a person is driving, operating, or in physical control of a car, motorcycle, boat, snowmobile, ATV, motorbike, off-road vehicle, or any other motor vehicle ([place] Statute 169A.20). I understand the risk if I choose to drive or operate any vehicle or machinery.    I understand that if I do not follow any of the conditions above, my prescriptions or treatment may be stopped or changed.          Opioids  What You Need to Know    What are opioids?   Opioids are pain medicines that must be prescribed by a doctor. They are also known as narcotics.     Examples are:   1. morphine (MS Contin, Amy)  2. oxycodone (Oxycontin)  3. oxycodone and acetaminophen (Percocet)  4. hydrocodone and acetaminophen (Vicodin, Norco)   5. fentanyl patch (Duragesic)   6. hydromorphone (Dilaudid)   7. methadone  8. codeine (Tylenol #3)     What do opioids do well?   Opioids are best for severe short-term pain such as after a surgery or injury. They may work well for cancer pain. They may  help some people with long-lasting (chronic) pain.     What do opioids NOT do well?   Opioids never get rid of pain entirely, and they don t work well for most patients with chronic pain. Opioids don t reduce swelling, one of the causes of pain.                                    Other ways to manage chronic pain and improve function include:       Treat the health problem that may be causing pain    Anti-inflammation medicines, which reduce swelling and tenderness, such as ibuprofen (Advil, Motrin) or naproxen (Aleve)    Acetaminophen (Tylenol)    Antidepressants and anti-seizure medicines, especially for nerve pain    Topical treatments such as patches or creams    Injections or nerve blocks    Chiropractic or osteopathic treatment    Acupuncture, massage, deep breathing, meditation, visual imagery, aromatherapy    Use heat or ice at the pain site    Physical therapy     Exercise    Stop smoking    Take part in therapy       Risks and side effects     Talk to your doctor before you start or decide to keep taking opioids. Possible side effects include:      Lowering your breathing rate enough to cause death    Overdose, including death, especially if taking higher than prescribed doses    Worse depression symptoms; less pleasure in things you usually enjoy    Feeling tired or sluggish    Slower thoughts or cloudy thinking    Being more sensitive to pain over time; pain is harder to control    Trouble sleeping or restless sleep    Changes in hormone levels (for example, less testosterone)    Changes in sex drive or ability to have sex    Constipation    Unsafe driving    Itching and sweating    Dizziness    Nausea, throwing up and dry mouth    What else should I know about opioids?    Opioids may lead to dependence, tolerance, or addiction.      Dependence means that if you stop or reduce the medicine too quickly, you will have withdrawal symptoms. These include loose poop (diarrhea), jitters, flu-like symptoms,  nervousness and tremors. Dependence is not the same as addiction.                       Tolerance means needing higher doses over time to get the same effect. This may increase the chance of serious side effects.      Addiction is when people improperly use a substance that harms their body, their mind or their relations with others. Use of opiates can cause a relapse of addiction if you have a history of drug or alcohol abuse.      People who have used opioids for a long time may have a lower quality of life, worse depression, higher levels of pain and more visits to doctors.    You can overdose on opioids. Take these steps to lower your risk of overdose:    1. Recognize the signs:  Signs of overdose include decrease or loss of consciousness (blackout), slowed breathing, trouble waking up and blue lips. If someone is worried about overdose, they should call 911.    2. Talk to your doctor about Narcan (naloxone).   If you are at risk for overdose, you may be given a prescription for Narcan. This medicine very quickly reverses the effects of opioids.   If you overdose, a friend or family member can give you Narcan while waiting for the ambulance. They need to know the signs of overdose and how to give Narcan.     3. Don't use alcohol or street drugs.   Taking them with opioids can cause death.    4. Do not take any of these medicines unless your doctor says it s OK. Taking these with opioids can cause death:    Benzodiazepines, such as lorazepam (Ativan), alprazolam (Xanax) or diazepam (Valium)    Muscle relaxers, such as cyclobenzaprine (Flexeril)    Sleeping pills like zolpidem (Ambien)     Other opioids      How to keep you and other people safe while taking opioids:    1. Never share your opioids with others.  Opioid medicines are regulated by the Drug Enforcement Agency ([place]). Selling or sharing medications is a criminal act.    2. Be sure to store opioids in a secure place, locked up if possible. Young children  can easily swallow them and overdose.    3. When you are traveling with your medicines, keep them in the original bottles. If you use a pill box, be sure you also carry a copy of your medicine list from your clinic or pharmacy.    4. Safe disposal of opioids    Most pharmacies have places to get rid of medicine, called disposal kiosks. Medicine disposal options are also available in every [facility]. Search your county and  medication disposal  to find more options. You can find more details at:  [email or web address]     I agree that my provider, clinic care team, and pharmacy may work with any city, state or federal law enforcement agency that investigates the misuse, sale, or other diversion of my controlled medicine. I will allow my provider to discuss my care with, or share a copy of, this agreement with any other treating provider, pharmacy or emergency room where I receive care.    I have read this agreement and have asked questions about anything I did not understand.    _______________________________________________________  Patient Signature - China Mckenzie _____________________                   Date     _______________________________________________________  Provider Signature - Heather Mckenzie MD   _____________________                   Date     _______________________________________________________  Witness Signature (required if provider not present while patient signing)   _____________________                   Date

## 2023-01-06 NOTE — PATIENT INSTRUCTIONS
Neurology [place], [place] [facility], [facility]    Magnesium and B complex vitamin may also help in headache reduction.         Our Clinic hours are:  Mondays    7:20 am - 7 pm  Tues -  Fri  7:20 am - 5 pm    Clinic Phone: [phone]    The clinic lab opens at 7:30 am Mon - Fri and appointments are required.    [facility]. [phone]  Monday  8 am - 7pm  Tues - Fri 8 am - 5:30 pm

## 2023-03-07 DIAGNOSIS — M12.9 ARTHROPATHY: ICD-10-CM

## 2023-03-07 DIAGNOSIS — G89.4 CHRONIC PAIN SYNDROME: ICD-10-CM

## 2023-03-07 RX ORDER — HYDROCODONE BITARTRATE AND ACETAMINOPHEN 5; 325 MG/1; MG/1
1 TABLET ORAL 2 TIMES DAILY PRN
Qty: 60 TABLET | Refills: 0 | Status: SHIPPED | OUTPATIENT
Start: 2023-03-09 | End: 2023-04-10

## 2023-03-07 NOTE — TELEPHONE ENCOUNTER
Chart and PDMP reviewed and appropriate.  Last fill was 2/8/2023 so I dated the next fill for 3/9/2023      Arthropathy  -     HYDROcodone-acetaminophen (NORCO) 5-325 MG tablet; Take 1 tablet by mouth 2 times daily as needed for severe pain (7-10)    Chronic pain syndrome  -     HYDROcodone-acetaminophen (NORCO) 5-325 MG tablet; Take 1 tablet by mouth 2 times daily as needed for severe pain (7-10)          Stephanie Sofia MD, covering for Dr. Mckenzie

## 2023-04-10 DIAGNOSIS — G89.4 CHRONIC PAIN SYNDROME: ICD-10-CM

## 2023-04-10 DIAGNOSIS — M12.9 ARTHROPATHY: ICD-10-CM

## 2023-04-10 RX ORDER — HYDROCODONE BITARTRATE AND ACETAMINOPHEN 5; 325 MG/1; MG/1
1 TABLET ORAL 2 TIMES DAILY PRN
Qty: 60 TABLET | Refills: 0 | Status: SHIPPED | OUTPATIENT
Start: 2023-04-10 | End: 2023-05-25

## 2023-04-20 ENCOUNTER — PATIENT OUTREACH (OUTPATIENT)
Dept: CARE COORDINATION | Facility: CLINIC | Age: 63
End: 2023-04-20
Payer: COMMERCIAL

## 2023-04-21 ENCOUNTER — VIRTUAL VISIT (OUTPATIENT)
Dept: NUTRITION | Facility: CLINIC | Age: 63
End: 2023-04-21
Payer: COMMERCIAL

## 2023-04-21 DIAGNOSIS — N18.31 STAGE 3A CHRONIC KIDNEY DISEASE (H): ICD-10-CM

## 2023-04-21 PROCEDURE — 97802 MEDICAL NUTRITION INDIV IN: CPT | Mod: 93

## 2023-04-21 NOTE — PATIENT INSTRUCTIONS
For kidney health, want to:    Reduce/Limit sodium (which is sounds like you are already doing!)  Add more plant-based foods (fruits, veggies, whole grains, nuts and seeds)  Limit protein (meat, cheese and dairy)  -It is recommended to keep protein between 30-40 gm/day (based on your weight).    -A 3 oz portion of meat (deck of card size) is 21 gm of protein so if you only have meat with dinner, try to keep 3 oz since milk also provides some protein. If you eat meat 2 times a day, try to limit 2 oz each meal.      Ideas for lunch to add more plant based foods:  -Toast with peanut butter and jelly OR sandwich (limit meat or cheese to 1-2 oz)  -Oatmeal (easy to make ahead and reheat) or overnight oats (you mix oats, a little milk and yogurt and fruit and let it soften overnight; meant to eat cold)  -Bowl of fruit or a smoothie (put in a container you can shake to reblend it before drinking)  -Small salad with some black beans  -Leftovers     Ideas for balancing dinner:  -Smaller meat portion or smaller sandwich and find some fruit or cold veggies to add volume back to help feel full and add more plant foods.  -Microwavable frozen veggies are easy to heat and eat in the microwave but also can buy salad kits or raw veggies such as baby carrots or grape tomatoes.      Easy way to balance dinner is the Plate Method:  1/2 plate veggies OR 1/4 plate veggies and 1/4 plate fruit (raw, frozen and canned if rinsed or are no-salt added/low-sugar are all fine)  1/4 plate lean meat (2-3 oz)  1/4 plate grains, ideally whole grains when able (1/2-1 cup rice/pasta/other grains/potatoes OR 1-2 slices bread)    Helpful Website: www.myplate.gov    FOLLOW UP: Please call to schedule a follow up if you are struggling or feel you need more help with food changes.    Thanks,  Yue Enciso,  HALIE, LD, [facility]   [phone]

## 2023-04-21 NOTE — PROGRESS NOTES
Texted invite. Patient not join by 1:10pm so resent text again and sent both text and email invites at 1:15pm. Patient not join by 1:20pm so ended video visit and tried to reach by phone instead.    Was able to reach China by phone but she was trying to set up [employer] and did not click on the texted links. Told her the video visit was ended by she really wanted to see if could connect by the link sent so ended telephone call and tried to go back into the video session at 1:26pm.  She did not join by 1:28pm so texted a new link to see if this would work instead. Will give it 5 minutes and call patient again if not joined. Was able to connect at 1:31pm but China was not able to see or hear provider after a couple of attempts so ended visit at 1:35pm and called by phone instead.     Was able to connect at 1:36pm.    Type of Service: Telephone Visit    Originating Location (Patient Location): Home  Distant Location (Provider Location): Offsite  Mode of Communication:  Telephone    Telephone Visit Start Time: 1:36pm  Telephone Visit End Time (telephone visit stop time): 2:15pm    How would patient like to obtain AVS? Mail a copy       Medical Nutrition Therapy  Visit Type:Initial assessment and intervention    China Mckenzie presents today for MNT and education related to chronic kidney disease.   She is accompanied by self.     ASSESSMENT:   Patient comments/concerns relating to nutrition: Says she was encouraged to see for Stage 3 CKD.  Says she has always been concerned about her weight as she is small in stature. Says she has always been very muscular/athletic and weight conscious. She wants to make sure she is eating properly to reverse her CKD.     Says she never puts salt into her food. Her mom never used salt as it caused her to swell.     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 Bought some fruit to make smoothies and flaxseed in it.     NUTRITION HISTORY:    Breakfast: fruit (banana) OR  cereal with milk   AM: Apple cinnamon bread   Lunch: Apple cinnamon bread   PM: apple cinnamon bread   Dinner: 5-8pm: hamburgers OR pork chops OR sandwich from [employer]  Snacks: none  Beverages: Chocolate Milk (with Nesquick) 1 cup/day and Water all day    Misses meals? 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 She also will walk her dogs.     SOCIO/ECONOMIC:   Lives with: spouse    MEDICATIONS:  Current Outpatient Medications   Medication     cholecalciferol 50 MCG (2000 UT) CAPS     cyclobenzaprine (FLEXERIL) 10 MG tablet     estradiol (ESTRACE) 0.5 MG tablet     FLUoxetine (PROZAC) 40 MG capsule     HYDROcodone-acetaminophen (NORCO) 5-325 MG tablet     NEW MED     propranolol ER (INDERAL LA) 60 MG 24 hr capsule     SUMAtriptan (IMITREX) 100 MG tablet     topiramate (TOPAMAX) 100 MG tablet     No current facility-administered medications for this visit.       LABS:  Lab Results   Component Value Date     05/20/2022     08/10/2020      Lab Results   Component Value Date    POTASSIUM 4.3 05/20/2022    POTASSIUM 3.6 08/10/2020     Lab Results   Component Value Date    CHLORIDE 109 05/20/2022    CHLORIDE 104 08/10/2020     Lab Results   Component Value Date    EDVIN 8.9 05/20/2022    EDVIN 8.8 08/10/2020     Lab Results   Component Value Date    CO2 28 05/20/2022    CO2 30 08/10/2020     Lab Results   Component Value Date    BUN 22 05/20/2022    BUN 24 08/10/2020     Lab Results   Component Value Date    CR 1.06 05/20/2022    CR 1.01 08/10/2020     Lab Results   Component Value Date     05/20/2022     08/10/2020     Lab Results   Component Value Date     05/20/2022     01/14/2020     HDL Cholesterol   Date Value Ref Range  "Status   01/14/2020 82 >49 mg/dL Final     Direct Measure HDL   Date Value Ref Range Status   05/20/2022 74 >=50 mg/dL Final   ]  GFR Estimate   Date Value Ref Range Status   05/20/2022 60 (L) >60 mL/min/1.73m2 Final     Comment:     Effective December 21, 2021 eGFRcr in adults is calculated using the 2021 CKD-EPI creatinine equation which includes age and gender (Cortez et al., NE, DOI: 10.1056/NQLJzm5365842)   08/10/2020 61 >60 mL/min/[1.73_m2] Final     Comment:     Non  GFR Calc  Starting 12/18/2018, serum creatinine based estimated GFR (eGFR) will be   calculated using the Chronic Kidney Disease Epidemiology Collaboration   (CKD-EPI) equation.       Lab Results   Component Value Date    CR 1.06 05/20/2022    CR 1.01 08/10/2020     No results found for: MICROALBUMIN    ANTHROPOMETRICS:  Vitals: There were no vitals taken for this visit.  Estimated body mass index is 24.4 kg/m  as calculated from the following:    Height as of 1/6/23: 1.397 m (4' 7\").    Weight as of 1/6/23: 47.6 kg (105 lb).       Wt Readings from Last 5 Encounters:   01/06/23 47.6 kg (105 lb)   09/16/22 46.3 kg (102 lb)   07/08/22 46.3 kg (102 lb)   06/24/22 44.9 kg (99 lb)   05/20/22 46.3 kg (102 lb)       Weight Change: Unable to assess- no new weight for today. Per clinic weight, gained 3 lbs from September until January.    ESTIMATED KCAL REQUIREMENTS:  1073 kcal per day (based on last clinic weight from 1/6/23)    NUTRITION DIAGNOSIS: Food- and nutrition-related knowledge deficit related to healthy eating to prevent decline as evidenced by China stating she is not sure what to modify to keep her kidney's healthy.     NUTRITION INTERVENTION:  Nutrition Prescription:   Protein Intake: 28-38 grams/day  Sodium Intake: <2000 mg/day  Education given to support: general nutrition guidelines, weight reduction, labeling, sodium, exercise, fiber, behavior modification, portion control, heart healthy diet and avoiding large " protein intake and eating more plant based food for kidney health  Education Materials Provided: Chronic Kidney Disease Stage 3-5 Nutrition Therapy  Motivational Interviewing      ASSESSMENT:   Started by discussing tips for kidney health to limit sodium, limit protein, and try to increase plant based foods such as whole grains, nuts, seeds, fruits and vegetables. Reviewed China's diet recall and she indicates she does not add any salt to food and has tried to limit protein for a long time. She appears to only eat meat with dinner and will have 1 glass of milk a day for dairy so recommended limiting protein size to 3 oz (deck of cards) or if eating meat 2 times a day, 2 oz at each meal. Suggested plate method to help balance meal and increase fruits and vegetables.    Did not discuss Potassium at this time because does not appear to have restrictions or elevated lab, nor was limiting Phosphorus intake discussed at this time since no recent labs for that or PTH; restrictions to these foods not recommended unless having trouble filtering. Pt verbalized understanding of concepts discussed and recommendations provided.       PATIENT'S BEHAVIOR CHANGE GOALS:   See Patient Instructions for patient stated behavior change goals. AVS was printed and mailed.    MONITOR / EVALUATE:  RD will monitor/evaluate:  Food / Beverage / Nutrient intake   Pertinent Labs  Progress toward meeting stated nutrition-related goals  Weight change    FOLLOW-UP:  Follow up with RD as needed.  Call RD with questions/concerns. Patient declines follow up at this time but feels comfortable reaching out if desired in the future.    Yue Enciso RDN, LD, [facility]   Time spent in minutes: 39 minutes  Encounter: Individual telephone visit

## 2023-05-23 ENCOUNTER — PATIENT OUTREACH (OUTPATIENT)
Dept: CARE COORDINATION | Facility: CLINIC | Age: 63
End: 2023-05-23
Payer: COMMERCIAL

## 2023-05-25 DIAGNOSIS — G89.4 CHRONIC PAIN SYNDROME: ICD-10-CM

## 2023-05-25 DIAGNOSIS — M12.9 ARTHROPATHY: ICD-10-CM

## 2023-05-25 RX ORDER — HYDROCODONE BITARTRATE AND ACETAMINOPHEN 5; 325 MG/1; MG/1
1 TABLET ORAL 2 TIMES DAILY PRN
Qty: 60 TABLET | Refills: 0 | Status: SHIPPED | OUTPATIENT
Start: 2023-05-25 | End: 2023-06-21

## 2023-05-26 ENCOUNTER — VIRTUAL VISIT (OUTPATIENT)
Dept: NEUROLOGY | Facility: CLINIC | Age: 63
End: 2023-05-26
Attending: FAMILY MEDICINE
Payer: COMMERCIAL

## 2023-05-26 ENCOUNTER — PRE VISIT (OUTPATIENT)
Dept: NEUROLOGY | Facility: CLINIC | Age: 63
End: 2023-05-26

## 2023-05-26 DIAGNOSIS — G43.009 MIGRAINE WITHOUT AURA AND WITHOUT STATUS MIGRAINOSUS, NOT INTRACTABLE: ICD-10-CM

## 2023-05-26 DIAGNOSIS — R69 DIAGNOSIS DEFERRED: Primary | ICD-10-CM

## 2023-05-26 PROCEDURE — 99207 PR SATISFY VISIT NUMBER: CPT | Mod: VID | Performed by: NURSE PRACTITIONER

## 2023-05-26 ASSESSMENT — HEADACHE IMPACT TEST (HIT 6)
HOW OFTEN DID HEADACHS LIMIT CONCENTRATION ON WORK OR DAILY ACTIVITY: VERY OFTEN
HOW OFTEN HAVE YOU FELT FED UP OR IRRITATED BECAUSE OF YOUR HEADACHES: NEVER
HOW OFTEN HAVE YOU FELT TOO TIRED TO WORK BECAUSE OF YOUR HEADACHES: SOMETIMES
WHEN YOU HAVE A HEADACHE HOW OFTEN DO YOU WISH YOU COULD LIE DOWN: SOMETIMES
HOW OFTEN DO HEADACHES LIMIT YOUR DAILY ACTIVITIES: SOMETIMES
HOW OFTEN DO HEADACHES LIMIT YOUR DAILY ACTIVITIES: SOMETIMES
HOW OFTEN DID HEADACHS LIMIT CONCENTRATION ON WORK OR DAILY ACTIVITY: VERY OFTEN
HIT6 TOTAL SCORE: 57
HOW OFTEN HAVE YOU FELT TOO TIRED TO WORK BECAUSE OF YOUR HEADACHES: SOMETIMES
HOW OFTEN HAVE YOU FELT FED UP OR IRRITATED BECAUSE OF YOUR HEADACHES: NEVER
WHEN YOU HAVE A HEADACHE HOW OFTEN DO YOU WISH YOU COULD LIE DOWN: SOMETIMES
WHEN YOU HAVE HEADACHES HOW OFTEN IS THE PAIN SEVERE: SOMETIMES
WHEN YOU HAVE HEADACHES HOW OFTEN IS THE PAIN SEVERE: SOMETIMES
HIT6 TOTAL SCORE: 57

## 2023-05-26 ASSESSMENT — MIGRAINE DISABILITY ASSESSMENT (MIDAS)
HOW MANY DAYS IN THE PAST 3 MONTHS HAVE YOU HAD A HEADACHE: 75
HOW MANY DAYS WAS YOUR PRODUCTIVITY CUT IN HALF BECAUSE OF HEADACHES: 0
HOW MANY DAYS DID YOU MISS WORK OR SCHOOL BECAUSE OF HEADACHES: 0
ON A SCALE FROM 0-10 ON AVERAGE HOW PAINFUL WERE HEADACHES: 7
HOW MANY DAYS DID YOU NOT DO HOUSEWORK BECAUSE OF HEADACHES: 15
HOW OFTEN WERE SOCIAL ACTIVITIES MISSED DUE TO HEADACHES: 5
TOTAL SCORE: 35
HOW MANY DAYS WAS HOUSEWORK PRODUCTIVITY CUT IN HALF DUE TO HEADACHES: 15

## 2023-05-26 NOTE — LETTER
5/26/2023       RE: China Mckenzie  [address] [facility] [place] [address]       Dear Colleague,    Thank you for referring your patient, China Mckenzie, to the [facility]. Please see a copy of my visit note below.    Unable to complete visit -technical issues. Telephone visit is not appropriate for new patient that I cannot even see. Patient needs in person new 60 minutes visit.   Patient is in agreement.       Again, thank you for allowing me to participate in the care of your patient.      Sincerely,    LARISA Maxwell CNP

## 2023-05-26 NOTE — PROGRESS NOTES
Unable to complete visit -technical issues. Telephone visit is not appropriate for new patient that I cannot even see. Patient needs in person new 60 minutes visit.   Patient is in agreement.

## 2023-05-30 NOTE — TELEPHONE ENCOUNTER
RECORDS RECEIVED FROM: Internal   REASON FOR VISIT: Migraine without aura and without status migrainosus, not intractable   Date of Appt: 7/3/23 12:30pm    NOTES (FOR ALL VISITS) STATUS DETAILS   OFFICE NOTE from referring provider Internal 1/6/23, 9/16/22, 5/20/22, 2/10/22, 1/26/22, 8/5/21 Heather Mckenzie MD @ [facility]     MEDICATION LIST Internal    IMAGING  (FOR ALL VISITS)     MRI (HEAD, NECK, SPINE) N/A No Imaging   CT (HEAD, NECK, SPINE) N/A No Imaging

## 2023-05-31 DIAGNOSIS — G43.009 MIGRAINE WITHOUT AURA AND WITHOUT STATUS MIGRAINOSUS, NOT INTRACTABLE: ICD-10-CM

## 2023-05-31 RX ORDER — PROPRANOLOL HCL 60 MG
60 CAPSULE, EXTENDED RELEASE 24HR ORAL DAILY
Qty: 90 CAPSULE | Refills: 2 | Status: SHIPPED | OUTPATIENT
Start: 2023-05-31 | End: 2024-04-16

## 2023-05-31 NOTE — TELEPHONE ENCOUNTER
"Prescription approved per [facility] Refill Protocol.    Pending Prescriptions:                       Disp   Refills    propranolol ER (INDERAL LA) 60 MG 24 hr c*90 cap*3            Sig: Take 1 capsule (60 mg) by mouth daily      Requested Prescriptions   Pending Prescriptions Disp Refills     propranolol ER (INDERAL LA) 60 MG 24 hr capsule 90 capsule 3     Sig: Take 1 capsule (60 mg) by mouth daily       Beta-Blockers Protocol Passed - 5/31/2023  8:13 AM        Passed - Blood pressure under 140/90 in past 12 months     BP Readings from Last 3 Encounters:   01/06/23 110/60   09/16/22 121/71   07/08/22 110/60                 Passed - Patient is age 6 or older        Passed - Recent (12 mo) or future (30 days) visit within the authorizing provider's specialty     Patient has had an office visit with the authorizing provider or a provider within the authorizing providers department within the previous 12 mos or has a future within next 30 days. See \"Patient Info\" tab in inbasket, or \"Choose Columns\" in Meds & Orders section of the refill encounter.              Passed - Medication is active on med list           Catie العلي RN on 5/31/2023 at 2:20 PM    "

## 2023-06-20 DIAGNOSIS — G89.4 CHRONIC PAIN SYNDROME: ICD-10-CM

## 2023-06-20 DIAGNOSIS — M12.9 ARTHROPATHY: ICD-10-CM

## 2023-06-21 RX ORDER — HYDROCODONE BITARTRATE AND ACETAMINOPHEN 5; 325 MG/1; MG/1
1 TABLET ORAL 2 TIMES DAILY PRN
Qty: 60 TABLET | Refills: 0 | Status: SHIPPED | OUTPATIENT
Start: 2023-06-21 | End: 2023-08-11

## 2023-07-03 ENCOUNTER — PRE VISIT (OUTPATIENT)
Dept: NEUROLOGY | Facility: CLINIC | Age: 63
End: 2023-07-03

## 2023-07-25 ENCOUNTER — TELEPHONE (OUTPATIENT)
Dept: FAMILY MEDICINE | Facility: CLINIC | Age: 63
End: 2023-07-25
Payer: COMMERCIAL

## 2023-07-25 DIAGNOSIS — G89.4 CHRONIC PAIN SYNDROME: ICD-10-CM

## 2023-07-25 DIAGNOSIS — M12.9 ARTHROPATHY: ICD-10-CM

## 2023-07-25 RX ORDER — HYDROCODONE BITARTRATE AND ACETAMINOPHEN 5; 325 MG/1; MG/1
1 TABLET ORAL 2 TIMES DAILY PRN
Qty: 60 TABLET | Refills: 0 | OUTPATIENT
Start: 2023-07-25

## 2023-07-25 NOTE — LETTER
[facility]  [address]  [facility] [address]  [phone]  July 26, 2023    China Mckenzie  [address] [place] [address]    Dear China,    We care about your health and have reviewed your health plan including your medical conditions, medication list, and lab results.  Based on this review, it is recommended that you follow up regarding the following health topic(s):     -Wellness (Physical) Visit  - Appointment needed for medication refills     Please call us at [phone] (or use [facility]) to address the above recommendations.     Thank you for trusting [facility] and we appreciate the opportunity to serve you.  We look forward to supporting your healthcare needs in the future.    Healthy Regards,      Your Health Care Team  [facility]

## 2023-08-11 DIAGNOSIS — G89.4 CHRONIC PAIN SYNDROME: ICD-10-CM

## 2023-08-11 DIAGNOSIS — M12.9 ARTHROPATHY: ICD-10-CM

## 2023-08-11 RX ORDER — HYDROCODONE BITARTRATE AND ACETAMINOPHEN 5; 325 MG/1; MG/1
1 TABLET ORAL 2 TIMES DAILY PRN
Qty: 60 TABLET | Refills: 0 | Status: SHIPPED | OUTPATIENT
Start: 2023-08-11 | End: 2023-09-07

## 2023-08-11 NOTE — TELEPHONE ENCOUNTER
Pending Prescriptions:                       Disp   Refills    HYDROcodone-acetaminophen (NORCO) 5-325 M*60 tab*0            Sig: Take 1 tablet by mouth 2 times daily as needed           for severe pain ** VISIT DUE/LAST FILL **    Next 5 appointments (look out 90 days)      Sep 13, 2023  1:20 PM  (Arrive by 1:00 PM)  Adult Preventative Visit with Heather Mckenzie MD  [facility] [address] [facility] [address] [phone]

## 2023-08-31 ENCOUNTER — TELEPHONE (OUTPATIENT)
Dept: FAMILY MEDICINE | Facility: CLINIC | Age: 63
End: 2023-08-31
Payer: COMMERCIAL

## 2023-08-31 NOTE — TELEPHONE ENCOUNTER
Symptoms    Describe your symptoms: Pt has been dizzy X 2-3 days.  [place] and [facility] to help with the dizziness.  Pt declines to use an OTC med and wants an Rx and she wants it by noon.   Please call patient and advise.      Any pain: No    How long have you been having symptoms: 2-3  days    Have you been seen for this:  No    Appointment offered?: No    Triage offered?: Yes:     Home remedies tried: Drinking lots of water     Preferred Pharmacy:   [facility] [address] [address] [address]  Phone: [phone] Fax: [phone]    Okay to leave a detailed message?: Yes at Cell number on file:    Telephone Information:   Mobile [phone]

## 2023-08-31 NOTE — TELEPHONE ENCOUNTER
"Pt was called, she was advised that the provider will not prescribe medication without a visit. Pt says she has been prescribed medication in the past, she said she gets dizzy, when she \"goes around spinning things.\" Pt ended call. Lisbet Cantu RN     "

## 2023-09-07 DIAGNOSIS — M12.9 ARTHROPATHY: ICD-10-CM

## 2023-09-07 DIAGNOSIS — G89.4 CHRONIC PAIN SYNDROME: ICD-10-CM

## 2023-09-07 RX ORDER — HYDROCODONE BITARTRATE AND ACETAMINOPHEN 5; 325 MG/1; MG/1
1 TABLET ORAL 2 TIMES DAILY PRN
Qty: 14 TABLET | Refills: 0 | Status: SHIPPED | OUTPATIENT
Start: 2023-09-07 | End: 2023-09-13

## 2023-09-11 ENCOUNTER — ANCILLARY PROCEDURE (OUTPATIENT)
Dept: MAMMOGRAPHY | Facility: CLINIC | Age: 63
End: 2023-09-11
Payer: COMMERCIAL

## 2023-09-11 DIAGNOSIS — Z12.31 VISIT FOR SCREENING MAMMOGRAM: ICD-10-CM

## 2023-09-11 PROCEDURE — 77067 SCR MAMMO BI INCL CAD: CPT | Mod: TC | Performed by: RADIOLOGY

## 2023-09-13 ENCOUNTER — OFFICE VISIT (OUTPATIENT)
Dept: FAMILY MEDICINE | Facility: CLINIC | Age: 63
End: 2023-09-13
Payer: COMMERCIAL

## 2023-09-13 ENCOUNTER — LAB (OUTPATIENT)
Dept: LAB | Facility: CLINIC | Age: 63
End: 2023-09-13
Payer: COMMERCIAL

## 2023-09-13 VITALS
OXYGEN SATURATION: 98 % | HEART RATE: 58 BPM | SYSTOLIC BLOOD PRESSURE: 124 MMHG | TEMPERATURE: 98.1 F | HEIGHT: 55 IN | BODY MASS INDEX: 24.13 KG/M2 | DIASTOLIC BLOOD PRESSURE: 70 MMHG | WEIGHT: 104.25 LBS | RESPIRATION RATE: 20 BRPM

## 2023-09-13 DIAGNOSIS — N18.31 STAGE 3A CHRONIC KIDNEY DISEASE (H): ICD-10-CM

## 2023-09-13 DIAGNOSIS — F41.1 GENERALIZED ANXIETY DISORDER: ICD-10-CM

## 2023-09-13 DIAGNOSIS — H90.11 CONDUCTIVE HEARING LOSS OF RIGHT EAR, UNSPECIFIED HEARING STATUS ON CONTRALATERAL SIDE: ICD-10-CM

## 2023-09-13 DIAGNOSIS — M12.9 ARTHROPATHY: ICD-10-CM

## 2023-09-13 DIAGNOSIS — N95.2 ATROPHIC VAGINITIS: ICD-10-CM

## 2023-09-13 DIAGNOSIS — G43.009 MIGRAINE WITHOUT AURA AND WITHOUT STATUS MIGRAINOSUS, NOT INTRACTABLE: ICD-10-CM

## 2023-09-13 DIAGNOSIS — F11.90 CHRONIC, CONTINUOUS USE OF OPIOIDS: ICD-10-CM

## 2023-09-13 DIAGNOSIS — G89.4 CHRONIC PAIN SYNDROME: ICD-10-CM

## 2023-09-13 DIAGNOSIS — Z00.00 ROUTINE GENERAL MEDICAL EXAMINATION AT A HEALTH CARE FACILITY: Primary | ICD-10-CM

## 2023-09-13 LAB
ANION GAP SERPL CALCULATED.3IONS-SCNC: 9 MMOL/L (ref 7–15)
BUN SERPL-MCNC: 39.8 MG/DL (ref 8–23)
CALCIUM SERPL-MCNC: 9.7 MG/DL (ref 8.8–10.2)
CHLORIDE SERPL-SCNC: 104 MMOL/L (ref 98–107)
CHOLEST SERPL-MCNC: 220 MG/DL
CREAT SERPL-MCNC: 1 MG/DL (ref 0.51–0.95)
CREAT UR-MCNC: 82.8 MG/DL
CREAT UR-MCNC: 83 MG/DL
DEPRECATED HCO3 PLAS-SCNC: 29 MMOL/L (ref 22–29)
EGFRCR SERPLBLD CKD-EPI 2021: 64 ML/MIN/1.73M2
GLUCOSE SERPL-MCNC: 165 MG/DL (ref 70–99)
HDLC SERPL-MCNC: 82 MG/DL
HGB BLD-MCNC: 13.8 G/DL (ref 11.7–15.7)
LDLC SERPL CALC-MCNC: 121 MG/DL
MICROALBUMIN UR-MCNC: <12 MG/L
MICROALBUMIN/CREAT UR: NORMAL MG/G{CREAT}
NONHDLC SERPL-MCNC: 138 MG/DL
POTASSIUM SERPL-SCNC: 4.5 MMOL/L (ref 3.4–5.3)
SODIUM SERPL-SCNC: 142 MMOL/L (ref 136–145)
TRIGL SERPL-MCNC: 84 MG/DL

## 2023-09-13 PROCEDURE — 82043 UR ALBUMIN QUANTITATIVE: CPT

## 2023-09-13 PROCEDURE — 85018 HEMOGLOBIN: CPT

## 2023-09-13 PROCEDURE — 99213 OFFICE O/P EST LOW 20 MIN: CPT | Mod: 25 | Performed by: FAMILY MEDICINE

## 2023-09-13 PROCEDURE — 80048 BASIC METABOLIC PNL TOTAL CA: CPT

## 2023-09-13 PROCEDURE — 80061 LIPID PANEL: CPT

## 2023-09-13 PROCEDURE — 36415 COLL VENOUS BLD VENIPUNCTURE: CPT

## 2023-09-13 PROCEDURE — G0480 DRUG TEST DEF 1-7 CLASSES: HCPCS

## 2023-09-13 PROCEDURE — 99396 PREV VISIT EST AGE 40-64: CPT | Performed by: FAMILY MEDICINE

## 2023-09-13 PROCEDURE — 82570 ASSAY OF URINE CREATININE: CPT

## 2023-09-13 RX ORDER — HYDROCODONE BITARTRATE AND ACETAMINOPHEN 5; 325 MG/1; MG/1
1 TABLET ORAL 2 TIMES DAILY PRN
Qty: 60 TABLET | Refills: 0 | Status: SHIPPED | OUTPATIENT
Start: 2023-11-06 | End: 2023-12-06

## 2023-09-13 RX ORDER — FLUOXETINE 40 MG/1
40 CAPSULE ORAL DAILY
Qty: 90 CAPSULE | Refills: 1 | Status: CANCELLED | OUTPATIENT
Start: 2023-09-13

## 2023-09-13 RX ORDER — HYDROCODONE BITARTRATE AND ACETAMINOPHEN 5; 325 MG/1; MG/1
1 TABLET ORAL 2 TIMES DAILY PRN
Qty: 60 TABLET | Refills: 0 | Status: SHIPPED | OUTPATIENT
Start: 2023-12-06 | End: 2024-01-11

## 2023-09-13 RX ORDER — HYDROCODONE BITARTRATE AND ACETAMINOPHEN 5; 325 MG/1; MG/1
1 TABLET ORAL 2 TIMES DAILY PRN
Qty: 60 TABLET | Refills: 0 | Status: SHIPPED | OUTPATIENT
Start: 2023-10-06 | End: 2023-11-05

## 2023-09-13 RX ORDER — TOPIRAMATE 100 MG/1
200 TABLET, FILM COATED ORAL DAILY
Qty: 180 TABLET | Refills: 3 | Status: SHIPPED | OUTPATIENT
Start: 2023-09-13 | End: 2024-08-05

## 2023-09-13 ASSESSMENT — ENCOUNTER SYMPTOMS
DIZZINESS: 1
FREQUENCY: 1
ARTHRALGIAS: 1
HEADACHES: 1

## 2023-09-13 ASSESSMENT — ANXIETY QUESTIONNAIRES
2. NOT BEING ABLE TO STOP OR CONTROL WORRYING: NOT AT ALL
4. TROUBLE RELAXING: SEVERAL DAYS
6. BECOMING EASILY ANNOYED OR IRRITABLE: NOT AT ALL
5. BEING SO RESTLESS THAT IT IS HARD TO SIT STILL: SEVERAL DAYS
IF YOU CHECKED OFF ANY PROBLEMS ON THIS QUESTIONNAIRE, HOW DIFFICULT HAVE THESE PROBLEMS MADE IT FOR YOU TO DO YOUR WORK, TAKE CARE OF THINGS AT HOME, OR GET ALONG WITH OTHER PEOPLE: NOT DIFFICULT AT ALL
3. WORRYING TOO MUCH ABOUT DIFFERENT THINGS: NOT AT ALL
GAD7 TOTAL SCORE: 3
1. FEELING NERVOUS, ANXIOUS, OR ON EDGE: SEVERAL DAYS
7. FEELING AFRAID AS IF SOMETHING AWFUL MIGHT HAPPEN: NOT AT ALL
GAD7 TOTAL SCORE: 3

## 2023-09-13 ASSESSMENT — PAIN SCALES - GENERAL: PAINLEVEL: NO PAIN (0)

## 2023-09-15 ENCOUNTER — TRANSFERRED RECORDS (OUTPATIENT)
Dept: HEALTH INFORMATION MANAGEMENT | Facility: CLINIC | Age: 63
End: 2023-09-15
Payer: COMMERCIAL

## 2023-09-15 LAB
DHC UR CFM-MCNC: 104 NG/ML
DHC/CREAT UR: 125 NG/MG {CREAT}
HYDROCODONE UR CFM-MCNC: 53 NG/ML
HYDROCODONE/CREAT UR: 64 NG/MG {CREAT}

## 2023-09-28 ENCOUNTER — ALLIED HEALTH/NURSE VISIT (OUTPATIENT)
Dept: FAMILY MEDICINE | Facility: CLINIC | Age: 63
End: 2023-09-28
Payer: COMMERCIAL

## 2023-09-28 DIAGNOSIS — Z11.1 SCREENING EXAMINATION FOR PULMONARY TUBERCULOSIS: Primary | ICD-10-CM

## 2023-09-28 PROCEDURE — 99207 PR NO CHARGE NURSE ONLY: CPT

## 2023-09-28 PROCEDURE — 86580 TB INTRADERMAL TEST: CPT

## 2023-09-28 NOTE — PROGRESS NOTES
Patient is here today for a Mantoux (TST) test placement.    Is there a current order in the chart? Yes    Reason for Mantoux (TST) in patient's own words: work    Patient needs form signed? Yes- completed per clinic's forms process.    Instructed patient to wait for 15 minutes post injection and to report any reactions immediately to staff.    Told patient to return to clinic in 48-72 hours to have Mantoux (TST) read.     Elsa Joshua, CMA

## 2023-10-02 ENCOUNTER — ALLIED HEALTH/NURSE VISIT (OUTPATIENT)
Dept: FAMILY MEDICINE | Facility: CLINIC | Age: 63
End: 2023-10-02
Payer: COMMERCIAL

## 2023-10-02 DIAGNOSIS — Z11.1 SCREENING EXAMINATION FOR PULMONARY TUBERCULOSIS: Primary | ICD-10-CM

## 2023-10-02 PROCEDURE — 86580 TB INTRADERMAL TEST: CPT

## 2023-10-02 PROCEDURE — 99207 PR NO CHARGE LOS: CPT

## 2023-10-02 NOTE — NURSING NOTE
"Patient is here today for a Mantoux (TST) test placement.    Is there a current order in the chart? No. Placed order according to standing order (reference the \"Skin Test- Tuberculosis Screening- Ambulatory Care\" standing order in [employer]). Review the Inclusion and Exclusion Criteria.        Inclusion Criteria  Pre-employment screening for healthcare workers and correctional facility staff - Administer two-step TST. Patient to return for second test in 1-3 weeks after first test is read.     Exclusion Criteria  None - Place order for Mantoux (TST) test per standing order.    Reason for Mantoux (TST) in patient's own words: needed for work    Patient needs form signed? No - form not needed per patient.    Instructed patient to wait for 15 minutes post injection and to report any reactions immediately to staff.    Told patient to return to clinic in 48-72 hours to have Mantoux (TST) read.           "

## 2023-10-04 ENCOUNTER — ALLIED HEALTH/NURSE VISIT (OUTPATIENT)
Dept: FAMILY MEDICINE | Facility: CLINIC | Age: 63
End: 2023-10-04
Payer: COMMERCIAL

## 2023-10-04 DIAGNOSIS — Z11.1 SCREENING FOR TUBERCULOSIS: Primary | ICD-10-CM

## 2023-10-04 PROCEDURE — 99207 PR NO CHARGE NURSE ONLY: CPT

## 2023-10-04 NOTE — LETTER
[facility]  [address] [facility] [address]  [phone]          10/4/2023          To Whom it May Concern:     China Mckenzie, female, 1960 has had a mantoux on 10/2/23.      Mantoux result is NEGATIVE: 0 mm induration      Please contact me for questions or concerns.        Sincerely,    Catie العلي RN on 10/4/2023 at 3:59 PM      [place] NORMA RN

## 2023-10-04 NOTE — PROGRESS NOTES
Patient is here today for a Mantoux (TST) test results.    Did patient return to clinic 48-72 hours from Mantoux (TST) placement: Yes -     No results found for: PPDINDURATIO  No results found for: PPDREDNESS        Induration Size? Induration <5mm - Enter results in Enter/Edit Activity. Route results to ordering provider.     Patient needs form signed? Yes. Follow clinic form process.     Patient reports having previously had the BCG Vaccine: No    Does patient need a two step? No this is second step    Catie العلي RN on 10/4/2023 at 4:04 PM

## 2023-10-17 ENCOUNTER — TELEPHONE (OUTPATIENT)
Dept: AUDIOLOGY | Facility: CLINIC | Age: 63
End: 2023-10-17
Payer: COMMERCIAL

## 2023-10-17 NOTE — TELEPHONE ENCOUNTER
LVM for patient that after review of hearing test, an updated hearing test is recommended so audiologists can have new scores and make a determination/suggestions for hearing loss. Gave direct # to schedule.    Anabell DUFF 10/17/23

## 2023-10-17 NOTE — TELEPHONE ENCOUNTER
Health Call Center    Phone Message    May a detailed message be left on voicemail: yes     Reason for Call: Other: The pt needs to get either cochlear implant or BAHA, she isn't sure. The referral mentions Cochlear, Please call her to discuss. She is not wearing HA now, she has them from  [facility] but they never worked properly for her. She has the most hearing in her L ear, and some in her R ear. Please discuss the next steps for her. Thanks.      Action Taken: Message routed to:  [facility] audio    Travel Screening: Not Applicable

## 2023-10-18 DIAGNOSIS — Z01.10 ENCOUNTER FOR HEARING TEST: Primary | ICD-10-CM

## 2023-10-19 ENCOUNTER — TELEPHONE (OUTPATIENT)
Dept: AUDIOLOGY | Facility: CLINIC | Age: 63
End: 2023-10-19

## 2023-10-19 NOTE — TELEPHONE ENCOUNTER
China called saying she double booked herself and cannot make appointment for today. Rescheduled, per pt.    Per audiologist Susan Hargrove, may be BAHA candidate, will review with audiologist performing new hearing test.    -Anabell DUFF 10/19/23

## 2023-10-23 ENCOUNTER — OFFICE VISIT (OUTPATIENT)
Dept: AUDIOLOGY | Facility: CLINIC | Age: 63
End: 2023-10-23
Payer: COMMERCIAL

## 2023-10-23 DIAGNOSIS — H90.11 CONDUCTIVE HEARING LOSS OF RIGHT EAR WITH UNRESTRICTED HEARING OF LEFT EAR: Primary | ICD-10-CM

## 2023-10-23 PROCEDURE — 92565 STENGER TEST PURE TONE: CPT | Performed by: AUDIOLOGIST-HEARING AID FITTER

## 2023-10-23 PROCEDURE — 92550 TYMPANOMETRY & REFLEX THRESH: CPT | Performed by: AUDIOLOGIST-HEARING AID FITTER

## 2023-10-23 PROCEDURE — 92557 COMPREHENSIVE HEARING TEST: CPT | Performed by: AUDIOLOGIST-HEARING AID FITTER

## 2023-10-23 NOTE — PROGRESS NOTES
AUDIOLOGY REPORT    SUMMARY: Audiology visit completed. See audiogram for results.      RECOMMENDATIONS: ENT consultation to discuss if there are any possible treatment options for right conductive hearing loss.    The patient expressed understanding and agreement with this plan.      Moises Becker, CCC-A, [facility]  Licensed Audiologist  [place] #[id number]

## 2023-10-25 NOTE — TELEPHONE ENCOUNTER
FUTURE VISIT INFORMATION      FUTURE VISIT INFORMATION:  Date: 1/22/2024  Time: 10 AM  [place] [facility]  REFERRAL INFORMATION:  Referring provider:    Referring providers clinic:    Reason for visit/diagnosis  discuss if there are any surgical options for right conductive hearing loss.  Audio completed 10/23/23     RECORDS REQUESTED FROM:       Clinic name Comments Records Status Imaging Status   [facility] 10/23/24 audiogram with Rosalba Arriaza AuD Epic    Outside facility 9/15/23 audiogram Scanned in [facility]

## 2023-10-26 ENCOUNTER — TELEPHONE (OUTPATIENT)
Dept: FAMILY MEDICINE | Facility: CLINIC | Age: 63
End: 2023-10-26
Payer: COMMERCIAL

## 2023-10-26 DIAGNOSIS — M62.830 BACK MUSCLE SPASM: ICD-10-CM

## 2023-10-26 RX ORDER — CYCLOBENZAPRINE HCL 10 MG
10 TABLET ORAL 3 TIMES DAILY PRN
Qty: 30 TABLET | Refills: 5 | Status: SHIPPED | OUTPATIENT
Start: 2023-10-26 | End: 2024-08-05

## 2023-10-26 NOTE — TELEPHONE ENCOUNTER
Reason for Call:  Appointment Request Back pain    Patient requesting this type of appt:  Back Pain - Pt has been going to Chiropractor but pain continues.     Requested provider:  Heather Mckenzie MD     Okay to leave a detailed message?: Yes at Home number on file [phone] (home)    Call taken on 10/26/2023 at 11:17 AM by Antoinette Arenas

## 2023-10-26 NOTE — TELEPHONE ENCOUNTER
Pt called regarding chronic back pain, pt reports seeing a chiropractor for many years regarding back pain and would like to look into other treatment options. Pt has an appt 11/14.     Sona Barrios RN     Statement Selected

## 2023-10-31 ENCOUNTER — PATIENT OUTREACH (OUTPATIENT)
Dept: GASTROENTEROLOGY | Facility: CLINIC | Age: 63
End: 2023-10-31
Payer: COMMERCIAL

## 2023-11-01 ENCOUNTER — TELEPHONE (OUTPATIENT)
Dept: FAMILY MEDICINE | Facility: CLINIC | Age: 63
End: 2023-11-01
Payer: COMMERCIAL

## 2023-11-01 NOTE — TELEPHONE ENCOUNTER
Yes, recommend she do the botox as often headache management requires more than one modality.    Heather Mckenzie M.D.

## 2023-11-01 NOTE — TELEPHONE ENCOUNTER
Symptoms    Describe your symptoms: Pt states that the increased dose of Topamax is helping her headaches, so she wants to know if she should keep the botox injection appt?  Please call patient and advise.      Any pain: No    How long have you been having symptoms: Ongoing      Have you been seen for this:  Yes:     Appointment offered?: No    Triage offered?: Yes:     Home remedies tried:     Preferred Pharmacy:   [facility] [place] [facility] [address] [facility] [address]  Phone: [phone] Fax: [phone]    Okay to leave a detailed message?: Yes at Cell number on file:    Telephone Information:   Mobile [phone]

## 2023-11-10 ENCOUNTER — OFFICE VISIT (OUTPATIENT)
Dept: FAMILY MEDICINE | Facility: CLINIC | Age: 63
End: 2023-11-10
Payer: COMMERCIAL

## 2023-11-10 VITALS
HEART RATE: 60 BPM | WEIGHT: 101 LBS | OXYGEN SATURATION: 99 % | SYSTOLIC BLOOD PRESSURE: 130 MMHG | HEIGHT: 55 IN | BODY MASS INDEX: 23.37 KG/M2 | DIASTOLIC BLOOD PRESSURE: 70 MMHG | TEMPERATURE: 97.4 F | RESPIRATION RATE: 20 BRPM

## 2023-11-10 DIAGNOSIS — M12.9 ARTHROPATHY: ICD-10-CM

## 2023-11-10 DIAGNOSIS — G89.4 CHRONIC PAIN SYNDROME: Primary | ICD-10-CM

## 2023-11-10 DIAGNOSIS — M54.50 CHRONIC BILATERAL LOW BACK PAIN WITHOUT SCIATICA: ICD-10-CM

## 2023-11-10 DIAGNOSIS — R69 TRAVEL-RELATED ILLNESS: ICD-10-CM

## 2023-11-10 DIAGNOSIS — G89.29 CHRONIC BILATERAL LOW BACK PAIN WITHOUT SCIATICA: ICD-10-CM

## 2023-11-10 DIAGNOSIS — R20.9 DISTURBANCE OF SKIN SENSATION: ICD-10-CM

## 2023-11-10 PROCEDURE — 99214 OFFICE O/P EST MOD 30 MIN: CPT | Performed by: NURSE PRACTITIONER

## 2023-11-10 RX ORDER — MECLIZINE HCL 12.5 MG 12.5 MG/1
12.5 TABLET ORAL 3 TIMES DAILY PRN
Qty: 20 TABLET | Refills: 0 | Status: SHIPPED | OUTPATIENT
Start: 2023-11-10

## 2023-11-10 ASSESSMENT — ANXIETY QUESTIONNAIRES
6. BECOMING EASILY ANNOYED OR IRRITABLE: NOT AT ALL
1. FEELING NERVOUS, ANXIOUS, OR ON EDGE: SEVERAL DAYS
7. FEELING AFRAID AS IF SOMETHING AWFUL MIGHT HAPPEN: NOT AT ALL
2. NOT BEING ABLE TO STOP OR CONTROL WORRYING: NOT AT ALL
GAD7 TOTAL SCORE: 1
3. WORRYING TOO MUCH ABOUT DIFFERENT THINGS: NOT AT ALL
GAD7 TOTAL SCORE: 1
5. BEING SO RESTLESS THAT IT IS HARD TO SIT STILL: NOT AT ALL

## 2023-11-10 ASSESSMENT — PATIENT HEALTH QUESTIONNAIRE - PHQ9
5. POOR APPETITE OR OVEREATING: NOT AT ALL
SUM OF ALL RESPONSES TO PHQ QUESTIONS 1-9: 1

## 2023-11-10 NOTE — PROGRESS NOTES
Assessment & Plan       ICD-10-CM    1. Chronic pain syndrome  G89.4 Physical Therapy Referral      2. Disturbance of skin sensation  R20.9 Physical Therapy Referral      3. Arthropathy  M12.9 Physical Therapy Referral      4. Chronic bilateral low back pain without sciatica  M54.50 Physical Therapy Referral    G89.29       5. Travel-related illness  R69 meclizine (ANTIVERT) 12.5 MG tablet        Ongoing chronic pain. Reviewed pdmp. Scheduled follow up for pain management with her pcp. Recommended physical therapy to learn exercises and stretching to replace chiropractic care.     Okay to try meclizine low dose when flying to help reduce potential symptoms of vertigo.     LARISA Jain CNP  [facility]   China is a 63 year old, presenting for the following health issues:  Back Pain        11/10/2023    11:33 AM   Additional Questions   Roomed by Sugar CHOW CMA   Accompanied by Self       HPI   Patient initially thought she was seeing Dr. Mckenzie, she is surprised that I was not her.     Pain History:  When did you first notice your pain? chronic   Have you seen this provider for your pain in the past? Yes   Where in your body do you have pain? Whole body and middle back  Are you seeing anyone else for your pain? No        2/10/2022     3:21 PM 1/6/2023    11:30 AM 11/10/2023    11:39 AM   PHQ-9 SCORE   PHQ-9 Total Score 0 5 1           1/6/2023    11:06 AM 9/13/2023     1:15 PM 11/10/2023    11:39 AM   RON-7 SCORE   Total Score  3 (minimal anxiety)    Total Score 0 3 1           1/6/2023    11:06 AM 11/10/2023    11:39 AM   PEG Score   PEG Total Score 9.33 8.33     Presenting today to discuss numerous things but also unable to identify one thing. She has a flight of thoughts/ ideas.   She is getting ready to retire due to the pain. She is having back and knee pain. 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 C/O Right knee locked up, occurs when in bed. When she lays on her left side it worsens. It will hurt so bad she can't touch it. She feels that in general her pain in her back is more often and this is worse because she is not going to the chiropractor.     She is wanting to be seen for additional back symptoms. She mentions seeing a spine doctor. She is not ready for this referral yet as the doctor she might want to see is located in [place].     She is on [place] and sees a chiropractor for pain management. Because she is retiring she will not be vale to see her chiropractor. This is causing significant concern. She is willing to start physical therapy.  Currently her back feels worse across where her bra sits. She doesn't wear a bra because of this.      She follows with Dr. Mckenzie for management of her Chronic Pain. She has a pain contract in place.     Last UDS: 9/15/2023      Finally, when leaving the room, she mentions dizziness. She is concerned about risk of falling with upcoming travel to [place] and the transition of the airplane and the escalator. She has fallen previously and would like to take something to reduce this risk. She denies any ongoing vertigo/ dizziness on a daily basis.     Review of Systems   Constitutional, HEENT, cardiovascular, pulmonary, gi and gu systems are negative, except as otherwise noted.      Objective    /70   Pulse 60   Temp 97.4  F (36.3  C) (Tympanic)   Resp 20   Ht 1.397 m (4' 7\")   Wt 45.8 kg (101 lb)   SpO2 99%   BMI 23.47 kg/m    Body mass index is 23.47 kg/m .  Physical Exam   GENERAL: healthy, alert and no distress  EYES: Eyes grossly normal to inspection, PERRL and conjunctivae and sclerae normal  RESP: lungs clear to auscultation - no rales, rhonchi or wheezes  CV: regular rate and rhythm, normal S1 S2, no S3 or S4, no murmur, click or rub, no peripheral edema and peripheral pulses strong  MS: Mid back muscle spasm. No reduce range of motion. " no gross musculoskeletal defects noted, no edema  PSYCH: mentation appears normal, affect normal/bright, thought process scattered.

## 2023-11-29 ENCOUNTER — THERAPY VISIT (OUTPATIENT)
Dept: PHYSICAL THERAPY | Facility: CLINIC | Age: 63
End: 2023-11-29
Attending: NURSE PRACTITIONER
Payer: COMMERCIAL

## 2023-11-29 DIAGNOSIS — M54.50 CHRONIC BILATERAL LOW BACK PAIN WITHOUT SCIATICA: ICD-10-CM

## 2023-11-29 DIAGNOSIS — G89.4 CHRONIC PAIN SYNDROME: Primary | ICD-10-CM

## 2023-11-29 DIAGNOSIS — G89.29 CHRONIC BILATERAL LOW BACK PAIN WITHOUT SCIATICA: ICD-10-CM

## 2023-11-29 PROCEDURE — 97110 THERAPEUTIC EXERCISES: CPT | Mod: GP | Performed by: PHYSICAL THERAPIST

## 2023-11-29 PROCEDURE — 97161 PT EVAL LOW COMPLEX 20 MIN: CPT | Mod: GP | Performed by: PHYSICAL THERAPIST

## 2023-11-29 PROCEDURE — 97140 MANUAL THERAPY 1/> REGIONS: CPT | Mod: GP | Performed by: PHYSICAL THERAPIST

## 2023-11-29 NOTE — PROGRESS NOTES
PHYSICAL THERAPY EVALUATION  Type of Visit: Evaluation    See electronic medical record for Abuse and Falls Screening details.    Subjective       Presenting condition or subjective complaint:  Patient states she has had upper back pain for many many years. Some days are better than others. 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 Some days its painful to wear a bra. Notes her whole left arm and left side of her trunk has been numb since she was a kid.   Date of onset: 11/10/23    Relevant medical history:   migraines, chronic pain syndrome, arthropathy, CKD stage 3, chronic continuous use of opioids, dyspareunia  Dates & types of surgery:      Prior diagnostic imaging/testing results:       Prior therapy history for the same diagnosis, illness or injury:        Living Environment  Social support:     Type of home:     Stairs to enter the home:         Ramp:     Stairs inside the home:         Help at home:    Equipment owned:       Employment:      Hobbies/Interests:      Patient goals for therapy:      Pain assessment: Pain present  Location: mid back pain/Ratin/10 currently     Objective   CERVICAL SPINE EVALUATION  PAIN: Pain Level at Rest: 0/10  Pain Level with Use: 10/10  Pain Location: thoracic spine  Pain Quality: Aching and Miserable  Pain Frequency: intermittent  Pain is Worst: daytime  Pain is Exacerbated By: work tasks  INTEGUMENTARY (edema, incisions): WNL  POSTURE: WNL  ROM:  some guarding, overall WNL , no pain with thoracic rotation/extension/SB, some CT junction discomfort with cervical flexion/extension  FLEXIBILITY: WNL   SPECIAL TESTS:    Left Right   Alar Ligament Negative              Compression Positive Positive                                                  PALPATION:  TTP medial border of scap bilaterally, notes her pain is not too bad today  SPINAL SEGMENTAL CONCLUSIONS: WNL  Decreased thoracic mobility with  CPA    Assessment & Plan   CLINICAL IMPRESSIONS  Medical Diagnosis: Chronic pain syndrome  Disturbance of skin sensation  Arthropathy  Chronic bilateral low back pain without sciatica    Treatment Diagnosis: thoracic pain   Impression/Assessment: Patient is a 63 year old female with thoracic pain complaints.  The following significant findings have been identified: Pain, Decreased ROM/flexibility, Decreased joint mobility, Decreased strength, Impaired muscle performance, and Decreased activity tolerance. These impairments interfere with their ability to perform self care tasks, work tasks, and recreational activities as compared to previous level of function.     Clinical Decision Making (Complexity):  Clinical Presentation: Stable/Uncomplicated  Clinical Presentation Rationale: based on medical and personal factors listed in PT evaluation  Clinical Decision Making (Complexity): Low complexity    PLAN OF CARE  Treatment Interventions:  Modalities: E-stim  Interventions: Manual Therapy, Neuromuscular Re-education, Therapeutic Activity, Therapeutic Exercise, Self-Care/Home Management    Long Term Goals     PT Goal 1  Goal Identifier: 1.  Goal Description: Patient will tolerate ADLs without pain.  Rationale: to maximize safety and independence with performance of ADLs and functional tasks  Target Date: 12/27/23  PT Goal 2  Goal Identifier: 2.  Goal Description: Patient will tolerate household and work tasks without pain in order to complete her duties.  Target Date: 01/24/24  PT Goal 3  Goal Identifier: 3.  Goal Description: Paitent will be ind with HEP in order to manage symptoms.  Target Date: 02/07/24      Frequency of Treatment: 1x/week  Duration of Treatment: 10 weeks    Education Assessment:        Risks and benefits of evaluation/treatment have been explained.   Patient/Family/caregiver agrees with Plan of Care.     Evaluation Time:     PT Eval, Low Complexity Minutes (55583): 20     Signing Clinician: Marie  Madhu, PT      [facility]                                                                                   OUTPATIENT PHYSICAL THERAPY      PLAN OF TREATMENT FOR OUTPATIENT REHABILITATION   Patient's Last Name, First Name, China Quick    YOB: 1960   Provider's Name   [facility]   Medical Record No.  [id number]     Onset Date: 11/10/23  Start of Care Date: 11/29/23     Medical Diagnosis:  Chronic pain syndrome  Disturbance of skin sensation  Arthropathy  Chronic bilateral low back pain without sciatica      PT Treatment Diagnosis:  thoracic pain Plan of Treatment  Frequency/Duration: 1x/week/ 10 weeks    Certification date from 11/29/23 to 02/07/24         See note for plan of treatment details and functional goals     Marie Leung, PT                         I CERTIFY THE NEED FOR THESE SERVICES FURNISHED UNDER        THIS PLAN OF TREATMENT AND WHILE UNDER MY CARE     (Physician attestation of this document indicates review and certification of the therapy plan).              Referring Provider:  Bernadine Gonzalez    Initial Assessment  See Epic Evaluation- Start of Care Date: 11/29/23

## 2023-12-19 ENCOUNTER — THERAPY VISIT (OUTPATIENT)
Dept: PHYSICAL THERAPY | Facility: CLINIC | Age: 63
End: 2023-12-19
Attending: NURSE PRACTITIONER
Payer: COMMERCIAL

## 2023-12-19 DIAGNOSIS — G89.4 CHRONIC PAIN SYNDROME: Primary | ICD-10-CM

## 2023-12-19 DIAGNOSIS — M54.50 CHRONIC BILATERAL LOW BACK PAIN WITHOUT SCIATICA: ICD-10-CM

## 2023-12-19 DIAGNOSIS — G89.29 CHRONIC BILATERAL LOW BACK PAIN WITHOUT SCIATICA: ICD-10-CM

## 2023-12-19 PROCEDURE — 97110 THERAPEUTIC EXERCISES: CPT | Mod: GP | Performed by: PHYSICAL THERAPIST

## 2023-12-20 ENCOUNTER — TELEPHONE (OUTPATIENT)
Dept: OTOLARYNGOLOGY | Facility: CLINIC | Age: 63
End: 2023-12-20
Payer: COMMERCIAL

## 2023-12-20 DIAGNOSIS — H90.11 CONDUCTIVE HEARING LOSS IN RIGHT EAR: Primary | ICD-10-CM

## 2024-01-11 DIAGNOSIS — M12.9 ARTHROPATHY: ICD-10-CM

## 2024-01-11 DIAGNOSIS — G89.4 CHRONIC PAIN SYNDROME: ICD-10-CM

## 2024-01-11 RX ORDER — HYDROCODONE BITARTRATE AND ACETAMINOPHEN 5; 325 MG/1; MG/1
1 TABLET ORAL 2 TIMES DAILY PRN
Qty: 30 TABLET | Refills: 0 | Status: SHIPPED | OUTPATIENT
Start: 2024-01-11 | End: 2024-02-22

## 2024-01-21 NOTE — PROGRESS NOTES
Chinadannie Mckenzie is seen in consultation from Dr. Mckenzie.  She is a 63 year old female being seen for right conductive hearing loss. She has had right hearing loss, with a history of recurrent ear infections, since she was a child. She says that both her parents said her loss was from recurrent ear infections although she reports she spent much of her childhood at her grandmother's. She tried hearing aids once or twice about 20 years ago, but says that it was too loud and she could hear every small noise around her and returned it after a few days. This was at [facility]. She has never had evaluation for the hearing loss. She has had no ear surgery including PE tubes. She is quite sure she has had hearing loss since childhood. She does not have her prior hearing test from [facility]. She is concerned that her ear canals are small as she has difficulty wearing ear buds.      Past Surgical History:   Procedure Laterality Date    BUNIONECTOMY Bilateral     x 4    COLONOSCOPY N/A 4/24/2015    Procedure: COMBINED COLONOSCOPY, SINGLE OR MULTIPLE BIOPSY/POLYPECTOMY BY BIOPSY;  Surgeon: Farheen Bright MD;  Location: [facility]    COLONOSCOPY N/A 6/24/2022    Procedure: COLONOSCOPY, FLEXIBLE, WITH LESION REMOVAL USING SNARE;  Surgeon: Vanessa Foster MD;  Location: [facility]    COLONOSCOPY N/A 6/24/2022    Procedure: COLONOSCOPY, WITH POLYPECTOMY AND BIOPSY;  Surgeon: Vanessa Foster MD;  Location: [facility]    RELEASE CARPAL TUNNEL Left 10/23/2015    Procedure: RELEASE CARPAL TUNNEL;  Surgeon: Nayan Brown MD;  Location: [facility]       Family History   Problem Relation Age of Onset    Cardiovascular Mother         rhematic fever       Social History     Tobacco Use    Smoking status: Never    Smokeless tobacco: Never   Vaping Use    Vaping Use: Never used   Substance Use Topics    Alcohol use: Yes     Comment: rare    Drug use: No       Physical examination:  Constitutional:  In no acute distress, appears stated  Refilled per protocol.     age  Eyes:  Extraocular movements intact, no spontaneous nystagmus  Ears:  Both ears examined under the microscope.  Both ear canals clear with no cerumen. Right: slight scarring seen in the TM, no large plaques of myringosclerosis, middle ear aerated. Left: middle ear well aerated with insufflation of TM.  Cazares lateralized to the right, Rinne test showed air greater than bone on the right, bone greater than air on the left.  Respiratory:  No increased work of breathing, wheezing or stridor  Musculoskeletal:  Good upper extremity strength  Skin:  No rashes on the head and neck  Neurologic:  House Brackman 1/6 bilaterally, ambulating normally  Psychiatric:  Alert, normal affect, answering questions appropriately    Audiogram:  Audiogram on 10/23/23 was independently reviewed. Right moderate upsloping to mild/moderate conductive hearing loss, left normal hearing. 92% speech discrimination right, 96% left. Normal tympanograms bilaterally. Absent reflexes except a present left ipsilateral reflex.        Assessment and plan:  China Jonah is seen in consultation from Dr. Mckenzie with a right conductive hearing loss. She is quite certain she has had the hearing loss since childhood. If this is the case, we discussed that the hearing loss was congenital and that surgery would not be recommended as there may be a higher chance of profound sensorineural hearing loss with surgical intervention. Recommendation was made for another right hearing aid trial. I had a lengthy discussion with her regarding expectations for hearing aid usage and that, since she has had hearing loss since childhood, her brain has become accustomed to the hearing loss and she is going to think a hearing aid is too loud. However, she should wear the hearing aid daily during all waking hours other than bathing in order to allow accomodation for the new level of hearing. I reassured her that Audiology would be able to fit a hearing aid even if her  ear canal was somewhat small and that her ear canal was not excessively small on examination today. She expressed understanding and said that now that she understood she had to allow her brain to get used to the hearing aid, she would be diligent about wearing it regularly during the trial period.    We will also obtain a CT temporal bones to see if a possible etiology of her hearing loss can be identified as it is unlikely it is secondary to her childhood ear infections.    Scribe Disclosure:   I, Trisha Bay, am serving as a scribe; to document services personally performed by Jael Aiken MD -based on data collection and the provider's statements to me.     Provider Disclosure:  I agree with above History, Review of Systems, Physical exam and Plan.  I have reviewed the content of the documentation and have edited it as needed. I have personally performed the services documented here and the documentation accurately represents those services and the decisions I have made.

## 2024-01-22 ENCOUNTER — PRE VISIT (OUTPATIENT)
Dept: OTOLARYNGOLOGY | Facility: CLINIC | Age: 64
End: 2024-01-22

## 2024-01-22 ENCOUNTER — OFFICE VISIT (OUTPATIENT)
Dept: OTOLARYNGOLOGY | Facility: CLINIC | Age: 64
End: 2024-01-22
Payer: COMMERCIAL

## 2024-01-22 VITALS
HEART RATE: 81 BPM | OXYGEN SATURATION: 96 % | TEMPERATURE: 97.6 F | WEIGHT: 102 LBS | BODY MASS INDEX: 23.61 KG/M2 | DIASTOLIC BLOOD PRESSURE: 67 MMHG | SYSTOLIC BLOOD PRESSURE: 112 MMHG | HEIGHT: 55 IN

## 2024-01-22 DIAGNOSIS — H90.11 CONDUCTIVE HEARING LOSS OF RIGHT EAR WITH UNRESTRICTED HEARING OF LEFT EAR: Primary | ICD-10-CM

## 2024-01-22 PROCEDURE — 92504 EAR MICROSCOPY EXAMINATION: CPT | Performed by: OTOLARYNGOLOGY

## 2024-01-22 PROCEDURE — 99203 OFFICE O/P NEW LOW 30 MIN: CPT | Mod: 25 | Performed by: OTOLARYNGOLOGY

## 2024-01-22 ASSESSMENT — PAIN SCALES - GENERAL: PAINLEVEL: NO PAIN (0)

## 2024-01-22 NOTE — PATIENT INSTRUCTIONS
You were seen in the ENT Clinic today by Dr. Aiken. If you have any questions or concerns after your appointment, please contact us (see below)      2.   Please obtain a CT scan.   3.   A referral to audiology will be placed for hearing aid consult.         How to Contact Us:  Send a [employer] message to your provider. Our team will respond to you via [employer]. Occasionally, we will need to call you to get further information.  For urgent matters (Monday-Friday), call the ENT Clinic: [phone] and speak with a call center team member - they will route your call appropriately.   If you'd like to speak directly with a nurse, please find our contact information below. We do our best to check voicemail frequently throughout the day, and will work to call you back within 1-2 days. For urgent matters, please use the general clinic phone numbers listed above.      Ginny BRAN RN  ENT RN Care Coordinator  Direct: [phone]    Irena BRAUN LPN  Direct: [phone]

## 2024-01-22 NOTE — NURSING NOTE
"Chief Complaint   Patient presents with    Consult     Possible surgical consult      Blood pressure 112/67, pulse 81, temperature 97.6  F (36.4  C), height 1.397 m (4' 7\"), weight 46.3 kg (102 lb), SpO2 96%, not currently breastfeeding.  Slade Howell LPN    "

## 2024-01-22 NOTE — LETTER
1/22/2024       RE: China Mckenzie  [id number] [address] [facility] [address]     Dear Colleague,    Thank you for referring your patient, China Mckenzie, to the [facility] [place] [facility]. Please see a copy of my visit note below.    China Mckenzie is seen in consultation from Dr. Mckenzie.  She is a 63 year old female being seen for right conductive hearing loss. She has had right hearing loss, with a history of recurrent ear infections, since she was a child. She says that both her parents said her loss was from recurrent ear infections although she reports she spent much of her childhood at her grandmother's. She tried hearing aids once or twice about 20 years ago, but says that it was too loud and she could hear every small noise around her and returned it after a few days. This was at [facility]. She has never had evaluation for the hearing loss. She has had no ear surgery including PE tubes. She is quite sure she has had hearing loss since childhood. She does not have her prior hearing test from [facility]. She is concerned that her ear canals are small as she has difficulty wearing ear buds.      Past Surgical History:   Procedure Laterality Date     BUNIONECTOMY Bilateral     x 4     COLONOSCOPY N/A 4/24/2015    Procedure: COMBINED COLONOSCOPY, SINGLE OR MULTIPLE BIOPSY/POLYPECTOMY BY BIOPSY;  Surgeon: Farheen Bright MD;  Location: [facility]     COLONOSCOPY N/A 6/24/2022    Procedure: COLONOSCOPY, FLEXIBLE, WITH LESION REMOVAL USING SNARE;  Surgeon: Vanessa Foster MD;  Location: [facility]     COLONOSCOPY N/A 6/24/2022    Procedure: COLONOSCOPY, WITH POLYPECTOMY AND BIOPSY;  Surgeon: Vanessa Foster MD;  Location: [facility]     RELEASE CARPAL TUNNEL Left 10/23/2015    Procedure: RELEASE CARPAL TUNNEL;  Surgeon: Nayan Brown MD;  Location: [facility]       Family History   Problem Relation Age of Onset      Cardiovascular Mother         rhematic fever       Social History     Tobacco Use     Smoking status: Never     Smokeless tobacco: Never   Vaping Use     Vaping Use: Never used   Substance Use Topics     Alcohol use: Yes     Comment: rare     Drug use: No       Physical examination:  Constitutional:  In no acute distress, appears stated age  Eyes:  Extraocular movements intact, no spontaneous nystagmus  Ears:  Both ears examined under the microscope.  Both ear canals clear with no cerumen. Right: slight scarring seen in the TM, no large plaques of myringosclerosis, middle ear aerated. Left: middle ear well aerated with insufflation of TM.  Cazares lateralized to the right, Rinne test showed air greater than bone on the right, bone greater than air on the left.  Respiratory:  No increased work of breathing, wheezing or stridor  Musculoskeletal:  Good upper extremity strength  Skin:  No rashes on the head and neck  Neurologic:  House Brackman 1/6 bilaterally, ambulating normally  Psychiatric:  Alert, normal affect, answering questions appropriately    Audiogram:  Audiogram on 10/23/23 was independently reviewed. Right moderate upsloping to mild/moderate conductive hearing loss, left normal hearing. 92% speech discrimination right, 96% left. Normal tympanograms bilaterally. Absent reflexes except a present left ipsilateral reflex.        Assessment and plan:  China Jonah is seen in consultation from Dr. Mckenzie with a right conductive hearing loss. She is quite certain she has had the hearing loss since childhood. If this is the case, we discussed that the hearing loss was congenital and that surgery would not be recommended as there may be a higher chance of profound sensorineural hearing loss with surgical intervention. Recommendation was made for another right hearing aid trial. I had a lengthy discussion with her regarding expectations for hearing aid usage and that, since she has had hearing loss since  childhood, her brain has become accustomed to the hearing loss and she is going to think a hearing aid is too loud. However, she should wear the hearing aid daily during all waking hours other than bathing in order to allow accomodation for the new level of hearing. I reassured her that Audiology would be able to fit a hearing aid even if her ear canal was somewhat small and that her ear canal was not excessively small on examination today. She expressed understanding and said that now that she understood she had to allow her brain to get used to the hearing aid, she would be diligent about wearing it regularly during the trial period.    We will also obtain a CT temporal bones to see if a possible etiology of her hearing loss can be identified as it is unlikely it is secondary to her childhood ear infections.    Scribe Disclosure:   I, Trisha Bay, am serving as a scribe; to document services personally performed by Jael Aiken MD -based on data collection and the provider's statements to me.     Provider Disclosure:  I agree with above History, Review of Systems, Physical exam and Plan.  I have reviewed the content of the documentation and have edited it as needed. I have personally performed the services documented here and the documentation accurately represents those services and the decisions I have made.      Again, thank you for allowing me to participate in the care of your patient.      Sincerely,    Jael Aiken MD

## 2024-02-20 ENCOUNTER — TELEPHONE (OUTPATIENT)
Dept: FAMILY MEDICINE | Facility: CLINIC | Age: 64
End: 2024-02-20
Payer: COMMERCIAL

## 2024-02-20 DIAGNOSIS — G43.009 MIGRAINE WITHOUT AURA AND WITHOUT STATUS MIGRAINOSUS, NOT INTRACTABLE: ICD-10-CM

## 2024-02-20 DIAGNOSIS — M12.9 ARTHROPATHY: ICD-10-CM

## 2024-02-20 DIAGNOSIS — G89.4 CHRONIC PAIN SYNDROME: ICD-10-CM

## 2024-02-21 RX ORDER — HYDROCODONE BITARTRATE AND ACETAMINOPHEN 5; 325 MG/1; MG/1
1 TABLET ORAL 2 TIMES DAILY PRN
Qty: 30 TABLET | Refills: 0 | OUTPATIENT
Start: 2024-02-21 | End: 2024-03-22

## 2024-02-21 RX ORDER — SUMATRIPTAN 100 MG/1
TABLET, FILM COATED ORAL
Qty: 18 TABLET | Refills: 11 | Status: SHIPPED | OUTPATIENT
Start: 2024-02-21 | End: 2024-08-05

## 2024-02-21 RX ORDER — HYDROCODONE BITARTRATE AND ACETAMINOPHEN 5; 325 MG/1; MG/1
1 TABLET ORAL 2 TIMES DAILY PRN
Qty: 30 TABLET | Refills: 0 | OUTPATIENT
Start: 2024-02-21

## 2024-02-21 NOTE — TELEPHONE ENCOUNTER
Called patient and rescheduled her for tomorrow for follow up with Dr. Mckenzie and informed her of the message from PCP.    RN encouraged patient to put reminders in her phone to help her remember scheduled appointments.    Marilyn Collazo RN on 2/21/2024 at 5:35 PM

## 2024-02-21 NOTE — TELEPHONE ENCOUNTER
RN called patient and informed her of Dr. Mckenzie's message.    RN scheduled patient for Office visit for medication follow up on 2/28/24.    Patient reports she sometimes misses appointments when she feels overwhelmed and forgets she has an appointment and it is unintentional.     She reports she has ongoing issues with pain with overall body aches. Patient reports the Norco is not fully addressing her pain and wonders if here is an additional therapeutic intervention to help the pain.  She tries heat therapy with baths and heating pads at home.     Patient would like a call back if Dr. Mckenzie is able to to fill the medication.     Marilyn Collazo RN on 2/21/2024 at 1:39 PM

## 2024-02-21 NOTE — TELEPHONE ENCOUNTER
No refill.  Agree with follow up in clinic.  Sounds like the Norco isn't helping the pain and we can discuss alternatives when I see her.    Heather Mckenzie M.D.

## 2024-02-21 NOTE — TELEPHONE ENCOUNTER
Hydrocodone/acetaminophen denied.  Patient no showed three appointments 11/21/2023, 1/2/24 and 1/29/2024.  Should be seen every 3 months per our controlled substance agreement.  Has now broken that agreement.      Heather Mckenzie M.D.

## 2024-02-22 ENCOUNTER — OFFICE VISIT (OUTPATIENT)
Dept: FAMILY MEDICINE | Facility: CLINIC | Age: 64
End: 2024-02-22
Payer: COMMERCIAL

## 2024-02-22 VITALS
BODY MASS INDEX: 24.99 KG/M2 | HEIGHT: 55 IN | OXYGEN SATURATION: 100 % | TEMPERATURE: 97.8 F | DIASTOLIC BLOOD PRESSURE: 70 MMHG | SYSTOLIC BLOOD PRESSURE: 122 MMHG | RESPIRATION RATE: 16 BRPM | HEART RATE: 57 BPM | WEIGHT: 108 LBS

## 2024-02-22 DIAGNOSIS — M12.9 ARTHROPATHY: ICD-10-CM

## 2024-02-22 DIAGNOSIS — G89.4 CHRONIC PAIN SYNDROME: Primary | ICD-10-CM

## 2024-02-22 DIAGNOSIS — N18.31 STAGE 3A CHRONIC KIDNEY DISEASE (H): ICD-10-CM

## 2024-02-22 PROCEDURE — 99214 OFFICE O/P EST MOD 30 MIN: CPT | Performed by: FAMILY MEDICINE

## 2024-02-22 RX ORDER — HYDROCODONE BITARTRATE AND ACETAMINOPHEN 5; 325 MG/1; MG/1
1 TABLET ORAL 2 TIMES DAILY PRN
Qty: 60 TABLET | Refills: 0 | Status: SHIPPED | OUTPATIENT
Start: 2024-04-22 | End: 2024-06-05

## 2024-02-22 RX ORDER — HYDROCODONE BITARTRATE AND ACETAMINOPHEN 5; 325 MG/1; MG/1
1 TABLET ORAL 2 TIMES DAILY PRN
Qty: 60 TABLET | Refills: 0 | Status: SHIPPED | OUTPATIENT
Start: 2024-03-23 | End: 2024-04-22

## 2024-02-22 RX ORDER — HYDROCODONE BITARTRATE AND ACETAMINOPHEN 5; 325 MG/1; MG/1
1 TABLET ORAL 2 TIMES DAILY PRN
Qty: 60 TABLET | Refills: 0 | Status: SHIPPED | OUTPATIENT
Start: 2024-02-22 | End: 2024-03-23

## 2024-02-22 ASSESSMENT — PAIN SCALES - GENERAL: PAINLEVEL: NO PAIN (0)

## 2024-02-22 NOTE — PROGRESS NOTES
"  Assessment & Plan     Chronic pain syndrome   CSA updated today  Will need Utox in September  Visit in 3 months needed  PDMP reviewed  - HYDROcodone-acetaminophen (NORCO) 5-325 MG tablet; Take 1 tablet by mouth 2 times daily as needed for severe pain ** VISIT DUE/LAST FILL **  - HYDROcodone-acetaminophen (NORCO) 5-325 MG tablet; Take 1 tablet by mouth 2 times daily as needed for severe pain  - HYDROcodone-acetaminophen (NORCO) 5-325 MG tablet; Take 1 tablet by mouth 2 times daily as needed for severe pain    Arthropathy   Patient asks if she can get a jacuzzi/hot tub for her pain.  She read somewhere that this could be covered by insurance, I have asked her to reach out to her insurance company and see if this is feasible.  We discussed that it is more likely that she might be able to use FSA/HSA monies and have medical support of this but it's unlikely that insurance would cover it.     - HYDROcodone-acetaminophen (NORCO) 5-325 MG tablet; Take 1 tablet by mouth 2 times daily as needed for severe pain ** VISIT DUE/LAST FILL **  - HYDROcodone-acetaminophen (NORCO) 5-325 MG tablet; Take 1 tablet by mouth 2 times daily as needed for severe pain  - HYDROcodone-acetaminophen (NORCO) 5-325 MG tablet; Take 1 tablet by mouth 2 times daily as needed for severe pain    Stage 3a chronic kidney disease (H)   Continue to monitor            BMI  Estimated body mass index is 25.1 kg/m  as calculated from the following:    Height as of this encounter: 1.397 m (4' 7\").    Weight as of this encounter: 49 kg (108 lb).             Devon Atkinson is a 63 year old, presenting for the following health issues:  Back Pain        2/22/2024     3:24 PM   Additional Questions   Roomed by Sugar CHOW CMA   Accompanied by Self     HPI       Pain History:  When did you first notice your pain? Chronic   Have you seen this provider for your pain in the past? Yes   Where in your body do you have pain? Low back and entire body  Are you seeing " "anyone else for your pain? No        2/10/2022     3:21 PM 1/6/2023    11:30 AM 11/10/2023    11:39 AM   PHQ-9 SCORE   PHQ-9 Total Score 0 5 1           1/6/2023    11:06 AM 9/13/2023     1:15 PM 11/10/2023    11:39 AM   RON-7 SCORE   Total Score  3 (minimal anxiety)    Total Score 0 3 1               Chronic Pain Follow Up:    Location of pain: whole body  Analgesia/pain control:    - Recent changes:  Pain seems worse if she is anxious. Brother recently dx with pancreatic cancer and dad with health issues   - Overall control: Comfortably manageable    - Current treatments: Opioids and muscle relaxer   Adherence:     - Do you ever take more pain medicine than prescribed? No    - When did you take your last dose of pain medicine?  On the way here   Adverse effects: No   PDMP Review         Value Time User    State PDMP site checked  Yes 11/10/2023 11:51 AM Bernadine Gonzalez APRN CNP          Last CSA Agreement:   CSA -- Patient Level:     [Media Unavailable] Controlled Substance Agreement - Opioid - Scan on 1/6/2023 12:13 PM   [Media Unavailable] Controlled Substance Agreement - Opioid - Scan on 2/14/2022 10:07 AM   [Media Unavailable] Controlled Substance Agreement - Opioid - Scan on 2/11/2022  9:26 AM   [Media Unavailable] Controlled Substance Agreement - Opioid - Scan on 5/27/2021  2:04 PM: opioid consent       Last UDS: 9/15/2023        Review of Systems  Constitutional, HEENT, cardiovascular, pulmonary, gi and gu systems are negative, except as otherwise noted.      Objective    /70   Pulse 57   Temp 97.8  F (36.6  C) (Tympanic)   Resp 16   Ht 1.397 m (4' 7\")   Wt 49 kg (108 lb)   SpO2 100%   BMI 25.10 kg/m    Body mass index is 25.1 kg/m .  Physical Exam   GENERAL: alert and no distress  PSYCH: mentation appears normal, affect normal/bright          Signed Electronically by: Heather Mckenzie MD    "

## 2024-02-22 NOTE — LETTER

## 2024-03-04 ENCOUNTER — TELEPHONE (OUTPATIENT)
Dept: FAMILY MEDICINE | Facility: CLINIC | Age: 64
End: 2024-03-04

## 2024-03-04 DIAGNOSIS — B00.1 COLD SORE: Primary | ICD-10-CM

## 2024-03-04 RX ORDER — VALACYCLOVIR HYDROCHLORIDE 1 G/1
2000 TABLET, FILM COATED ORAL 2 TIMES DAILY
Qty: 4 TABLET | Refills: 3 | Status: SHIPPED | OUTPATIENT
Start: 2024-03-04 | End: 2024-03-05

## 2024-03-04 NOTE — TELEPHONE ENCOUNTER
S-(situation): Patient calling asking for a prescription for cold sores. She has a sore on her lip. She has no insurance right now.     B-(background): China reports being under a lot of stress. She is being forced into longterm because of the pain she is in. 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     A-(assessment): patient used some of her brothers Denavir 1 % cream which helped.     R-(recommendations): China is asking for any Rx you think would help, oral or topical. Please send to [facility]. Care Team to notify patient if PCP does not fill.  Thank you, Catie LU RN

## 2024-03-04 NOTE — TELEPHONE ENCOUNTER
Orders Placed This Encounter    valACYclovir (VALTREX) 1000 mg tablet       Valtrex sent to pharmacy.   Take 2 pills twice a day for 1 day.    This has refills if there are further outbreaks.    Heather Mckenzie M.D.

## 2024-04-15 DIAGNOSIS — G43.009 MIGRAINE WITHOUT AURA AND WITHOUT STATUS MIGRAINOSUS, NOT INTRACTABLE: ICD-10-CM

## 2024-04-16 RX ORDER — PROPRANOLOL HCL 60 MG
60 CAPSULE, EXTENDED RELEASE 24HR ORAL DAILY
Qty: 90 CAPSULE | Refills: 1 | Status: SHIPPED | OUTPATIENT
Start: 2024-04-16 | End: 2024-05-23

## 2024-05-13 NOTE — PROGRESS NOTES
[facility] - Outpatient Rehabilitation and Therapy [address], [place] [address] office: [phone] fax: [phone]     Physical Therapy: TREATMENT/PROGRESS NOTE   Patient: Brodie Avalos (60 y.o. male)   Examination Date: 2024   :  1964 MRN: [id number]   Visit #: 7   Insurance Allowable Auth Needed   AUTH [x]Yes    []No    Insurance: Payor: BCBS / Plan: [facility] - [place] PPO / Product Type: *No Product type* /   Insurance ID: [id number] - ([facility])  Secondary Insurance (if applicable):    Treatment Diagnosis:     ICD-10-CM    1. Chronic right-sided low back pain without sciatica  M54.50     G89.29       2. Postural instability of trunk  R68.89          Medical Diagnosis:  Right hip pain [M25.551]  DDD (degenerative disc disease), lumbar [M51.36]   Referring Physician: Willis Bledsoe MD  PCP: Willis Bledsoe MD     Plan of care signed (Y/N): Y    Date of Patient follow up with Physician: PRN     Progress Report/POC: NO  POC update due: (10 visits /OR AUTH LIMITS, whichever is less)  2024                                             Precautions/ Contra-indications:           Latex allergy:  NO  Pacemaker:    NO  Contraindications for Manipulation: None  Date of Surgery: NA  Other:    Red Flags:  None    C-SSRS Triggered by Intake questionnaire:   [x] No, Questionnaire did not trigger screening.   [] Yes, Patient intake triggered further evaluation      [] C-SSRS Screening completed  [] PCP notified via Plan of Care  [] Emergency services notified     Preferred Language for Healthcare:   [x] English       [] other:    SUBJECTIVE EXAMINATION     Patient stated complaint: \"As long as I am not running my back is fine\"     Test used Initial score  2024   Pain Summary VAS 0-7/10 0/10   Functional questionnaire Modified Oswestry and IHOT Hip outcome tool VANI: 3-5/10  iHOT: 120/130    Other:              OBJECTIVE EXAMINATION     SUBJECTIVE:   CC: [place] is an 62 year old who presents for preventive health visit.       9/13/2023     1:06 PM   Additional Questions   Roomed by Sugar CHOW CMA   Accompanied by self       HPI      Anxiety Follow-Up  Prozac 40mg qd  How are you doing with your anxiety since your last visit? No change  Are you having other symptoms that might be associated with anxiety? No  Have you had a significant life event? He dad just went to the hospital due to liver failure   Are you feeling depressed? No  Do you have any concerns with your use of alcohol or other drugs? No    Social History     Tobacco Use    Smoking status: Never    Smokeless tobacco: Never   Vaping Use    Vaping Use: Never used   Substance Use Topics    Alcohol use: Yes     Comment: rare    Drug use: No         9/16/2022    10:57 AM 1/6/2023    11:06 AM 9/13/2023     1:15 PM   RON-7 SCORE   Total Score 3 (minimal anxiety)  3 (minimal anxiety)   Total Score 3 0 3         5/10/2021     1:36 PM 2/10/2022     3:21 PM 1/6/2023    11:30 AM   PHQ   PHQ-9 Total Score 0 0 5   Q9: Thoughts of better off dead/self-harm past 2 weeks Not at all Not at all Not at all         Migraine   Since your last clinic visit, how have your headaches changed?  No change  How often are you getting headaches or migraines? daily   Are you able to do normal daily activities when you have a migraine? No  Are you taking rescue/relief medications? (Select all that apply) sumatriptan (Imitrex)  How helpful is your rescue/relief medication?  I get some relief  Are you taking any medications to prevent migraines? (Select all that apply)  Inderal and Topamax  In the past 4 weeks, how often have you gone to urgent care or the emergency room because of your headaches?  2      Social History     Tobacco Use    Smoking status: Never    Smokeless tobacco: Never   Substance Use Topics    Alcohol use: Yes     Comment: rare             9/13/2023     1:23 PM   Alcohol Use   Prescreen: >3  drinks/day or >7 drinks/week? No     Reviewed orders with patient.  Reviewed health maintenance and updated orders accordingly - Yes  Lab work is in process  Labs reviewed in EPIC    Breast Cancer Screening:  Any new diagnosis of family breast, ovarian, or bowel cancer? No    FHS-7:       5/20/2022     8:51 AM 6/20/2022     3:27 PM 9/11/2023     4:13 PM   Breast CA Risk Assessment (FHS-7)   Did any of your first-degree relatives have breast or ovarian cancer? Yes No No   Did any of your relatives have bilateral breast cancer? No No No   Did any man in your family have breast cancer? No No No   Did any woman in your family have breast and ovarian cancer? Yes No No   Did any woman in your family have breast cancer before age 50 y? Yes No No   Do you have 2 or more relatives with breast and/or ovarian cancer? No No No   Do you have 2 or more relatives with breast and/or bowel cancer? Unknown No No       Mammogram Screening: Recommended annual mammography  Pertinent mammograms are reviewed under the imaging tab.    History of abnormal Pap smear: NO - age 30-65 PAP every 5 years with negative HPV co-testing recommended      Latest Ref Rng & Units 5/20/2022     9:21 AM 5/3/2017     4:00 PM 5/3/2017     3:53 PM   PAP / HPV   PAP  Negative for Intraepithelial Lesion or Malignancy (NILM)      PAP (Historical)    NIL    HPV 16 DNA Negative Negative  Negative     HPV 18 DNA Negative Negative  Negative     Other HR HPV Negative Negative  Negative       Reviewed and updated as needed this visit by clinical staff   Tobacco  Allergies  Meds              Reviewed and updated as needed this visit by Provider                     Review of Systems  CONSTITUTIONAL: NEGATIVE for fever, chills, change in weight  INTEGUMENTARY/SKIN: NEGATIVE for worrisome rashes, moles or lesions  EYES: NEGATIVE for vision changes or irritation  ENT: NEGATIVE for ear, mouth and throat problems  RESP: NEGATIVE for significant cough or SOB  BREAST:  "NEGATIVE for masses, tenderness or discharge  CV: NEGATIVE for chest pain, palpitations or peripheral edema  GI: NEGATIVE for nausea, abdominal pain, heartburn, or change in bowel habits  : NEGATIVE for unusual urinary or vaginal symptoms. No vaginal bleeding.  MUSCULOSKELETAL: NEGATIVE for significant arthralgias or myalgia  NEURO: ongoing headaches, using imitrex almost daily  PSYCHIATRIC: moods are \"okay\". Only taking fluoxetine every other day - did take a break for a few weeks. Notices a difference when she is not on it.        OBJECTIVE:   /70   Pulse 58   Temp 98.1  F (36.7  C) (Tympanic)   Resp 20   Ht 1.397 m (4' 7\")   Wt 47.3 kg (104 lb 4 oz)   SpO2 98%   BMI 24.23 kg/m    Physical Exam  GENERAL: healthy, alert and no distress  NECK: no adenopathy, no asymmetry, masses, or scars and thyroid normal to palpation  RESP: lungs clear to auscultation - no rales, rhonchi or wheezes  CV: regular rate and rhythm, normal S1 S2, no S3 or S4, no murmur, click or rub, no peripheral edema and peripheral pulses strong  ABDOMEN: soft, nontender, no hepatosplenomegaly, no masses and bowel sounds normal  MS: no gross musculoskeletal defects noted, no edema    Diagnostic Test Results:  Labs reviewed in Epic    ASSESSMENT/PLAN:   (Z00.00) Routine general medical examination at a health care facility  (primary encounter diagnosis)  Comment:    Plan:      (N18.31) Stage 3a chronic kidney disease (H)  Comment:  continue monitoring, BMP troday  Plan: BASIC METABOLIC PANEL, Lipid panel reflex to         direct LDL Non-fasting, Albumin Random Urine         Quantitative with Creat Ratio, Hemoglobin             (F11.90) Chronic, continuous use of opioids  Comment:    Plan: [id number] - Urine Drug Confirmation Panel         (Comprehensive)             (G89.4) Chronic pain syndrome  Comment:  UDS done  CSA up to date, due again after the first of the year  Plan: [id number] - Urine Drug Confirmation Panel         " (Comprehensive), HYDROcodone-acetaminophen         (NORCO) 5-325 MG tablet,         HYDROcodone-acetaminophen (NORCO) 5-325 MG         tablet, HYDROcodone-acetaminophen (NORCO) 5-325        MG tablet             (M12.9) Arthropathy  Comment:    Plan: HYDROcodone-acetaminophen (NORCO) 5-325 MG         tablet, HYDROcodone-acetaminophen (NORCO) 5-325        MG tablet, HYDROcodone-acetaminophen (NORCO)         5-325 MG tablet             (G43.009) Migraine without aura and without status migrainosus, not intractable  Comment: increase Topamax to 200 mg daily  Plan: topiramate (TOPAMAX) 100 MG tablet             (F41.1) Generalized anxiety disorder  Comment: stable, would recommend daily use of medication but will lower the dose a bit to 20 mg daily  Plan: FLUoxetine (PROZAC) 20 MG capsule             (N95.2) Atrophic vaginitis  Comment:    Plan: stopped using vaginal estrogen - didn't feel it's necessary    (H90.11) Conductive hearing loss of right ear, unspecified hearing status on contralateral side  Comment:    Plan: Adult Audiology  Referral, Adult ENT          Referral             Patient has been advised of split billing requirements and indicates understanding: Yes      COUNSELING:  Reviewed preventive health counseling, as reflected in patient instructions       Regular exercise       Healthy diet/nutrition        She reports that she has never smoked. She has never used smokeless tobacco.          Heather Mckenzie MD  [facility] HospitalAnswers submitted by the patient for this visit:  RON-7 (Submitted on 9/13/2023)  RON 7 TOTAL SCORE: 3

## 2024-05-23 DIAGNOSIS — G43.009 MIGRAINE WITHOUT AURA AND WITHOUT STATUS MIGRAINOSUS, NOT INTRACTABLE: ICD-10-CM

## 2024-05-23 RX ORDER — PROPRANOLOL HYDROCHLORIDE 60 MG/1
60 CAPSULE, EXTENDED RELEASE ORAL DAILY
Qty: 90 CAPSULE | Refills: 0 | Status: SHIPPED | OUTPATIENT
Start: 2024-05-23 | End: 2024-08-05

## 2024-05-23 NOTE — TELEPHONE ENCOUNTER
Excedrin migraine may be helpful.     Is she congested?  If so, Afrin nasal spray may be useful.     Take 600 mg ibuprofen for the headache.    Heather Mckenzie M.D.

## 2024-05-23 NOTE — TELEPHONE ENCOUNTER
"Contacted patient   States \"my head is busting open\".  Requesting med for migraine temporarily until she returns from [place].   Currently boarding the airplane. Will return in a week.   Advised to call and schedule appointment upon return.   Advised to avoid alcohol, caffeine and chocolate. Stay hydrated and eat frequently.  Refilled Propranolol as requested. Has taken this at 530 am Imitrex and Topamax. With no relief. Advised she may repeat the Imitrex.   Reports daily migraines for the past few weeks.     Requesting to message Dr. Mckenzie for any other medications she can try in the interim.   _____________________________________________________________  Prescription approved per [facility] Refill Protocol.  Pending Prescriptions:                       Disp   Refills    propranolol ER (INDERAL LA) 60 MG 24 hr c*90 cap*1            Sig: Take 1 capsule (60 mg) by mouth daily    Requested Prescriptions   Pending Prescriptions Disp Refills    propranolol ER (INDERAL LA) 60 MG 24 hr capsule 90 capsule 1     Sig: Take 1 capsule (60 mg) by mouth daily       Beta-Blockers Protocol Passed - 5/23/2024  8:31 AM        Passed - Blood pressure under 140/90 in past 12 months     BP Readings from Last 3 Encounters:   02/22/24 122/70   01/22/24 112/67   11/10/23 130/70       No data recorded            Passed - Patient is age 6 or older        Passed - Medication is active on med list        Passed - Medication indicated for associated diagnosis     Medication is associated with one or more of the following diagnoses:     Hypertension (HTN)   Atrial fibrillation/flutter   Angina   ASCVD   Migraine   Heart Failure   Tremor   Anxiety   Ocular hypertension   Glaucoma          Passed - Recent (12 mo) or future (90 days) visit within the authorizing provider's specialty     The patient must have completed an in-person or virtual visit within the past 12 months or has a future visit scheduled within the next 90 days with the " authorizing provider s specialty.  Urgent care and e-visits do not quality as an office visit for this protocol.             Catie العلي RN on 5/23/2024 at 8:33 AM

## 2024-05-23 NOTE — TELEPHONE ENCOUNTER
General Call      Reason for Call:  Patient calling stating she is leaving for vacation today and is experiencing terrible migraines and is wondering what to do. She is currently taking Propranolol, Topamax and Imitrex. She was never able to get botox due to insurance issues but now has active insurance again and plans on scheduling when she gets back.   Patient will be flying around 1030 ok to      Could we send this information to you in [facility] or would you prefer to receive a phone call?:   Patient would prefer a phone call   Okay to leave a detailed message?: Yes at Home number on file [phone] (home)    Ruiz Almaraz [facility] Maria Dolores

## 2024-06-04 DIAGNOSIS — G89.4 CHRONIC PAIN SYNDROME: ICD-10-CM

## 2024-06-04 DIAGNOSIS — M12.9 ARTHROPATHY: ICD-10-CM

## 2024-06-05 RX ORDER — HYDROCODONE BITARTRATE AND ACETAMINOPHEN 5; 325 MG/1; MG/1
1 TABLET ORAL 2 TIMES DAILY PRN
Qty: 60 TABLET | Refills: 0 | Status: SHIPPED | OUTPATIENT
Start: 2024-06-05 | End: 2024-08-05

## 2024-08-05 ENCOUNTER — ANCILLARY PROCEDURE (OUTPATIENT)
Dept: GENERAL RADIOLOGY | Facility: CLINIC | Age: 64
End: 2024-08-05
Attending: FAMILY MEDICINE
Payer: COMMERCIAL

## 2024-08-05 ENCOUNTER — OFFICE VISIT (OUTPATIENT)
Dept: FAMILY MEDICINE | Facility: CLINIC | Age: 64
End: 2024-08-05
Payer: COMMERCIAL

## 2024-08-05 VITALS
WEIGHT: 109.38 LBS | HEIGHT: 55 IN | BODY MASS INDEX: 25.31 KG/M2 | RESPIRATION RATE: 16 BRPM | SYSTOLIC BLOOD PRESSURE: 110 MMHG | DIASTOLIC BLOOD PRESSURE: 74 MMHG | TEMPERATURE: 97.6 F | OXYGEN SATURATION: 98 % | HEART RATE: 54 BPM

## 2024-08-05 DIAGNOSIS — M54.89 OTHER CHRONIC BACK PAIN: Primary | ICD-10-CM

## 2024-08-05 DIAGNOSIS — M54.89 OTHER CHRONIC BACK PAIN: ICD-10-CM

## 2024-08-05 DIAGNOSIS — M62.830 BACK MUSCLE SPASM: ICD-10-CM

## 2024-08-05 DIAGNOSIS — G89.29 OTHER CHRONIC BACK PAIN: ICD-10-CM

## 2024-08-05 DIAGNOSIS — G89.4 CHRONIC PAIN SYNDROME: ICD-10-CM

## 2024-08-05 DIAGNOSIS — M12.9 ARTHROPATHY: ICD-10-CM

## 2024-08-05 DIAGNOSIS — G43.009 MIGRAINE WITHOUT AURA AND WITHOUT STATUS MIGRAINOSUS, NOT INTRACTABLE: ICD-10-CM

## 2024-08-05 DIAGNOSIS — N18.31 STAGE 3A CHRONIC KIDNEY DISEASE (H): ICD-10-CM

## 2024-08-05 DIAGNOSIS — G89.29 OTHER CHRONIC BACK PAIN: Primary | ICD-10-CM

## 2024-08-05 PROCEDURE — 72070 X-RAY EXAM THORAC SPINE 2VWS: CPT | Mod: TC | Performed by: INTERNAL MEDICINE

## 2024-08-05 PROCEDURE — 72040 X-RAY EXAM NECK SPINE 2-3 VW: CPT | Mod: TC | Performed by: INTERNAL MEDICINE

## 2024-08-05 PROCEDURE — G2211 COMPLEX E/M VISIT ADD ON: HCPCS | Performed by: FAMILY MEDICINE

## 2024-08-05 PROCEDURE — 99214 OFFICE O/P EST MOD 30 MIN: CPT | Performed by: FAMILY MEDICINE

## 2024-08-05 RX ORDER — HYDROCODONE BITARTRATE AND ACETAMINOPHEN 5; 325 MG/1; MG/1
1 TABLET ORAL 2 TIMES DAILY PRN
Qty: 60 TABLET | Refills: 0 | Status: SHIPPED | OUTPATIENT
Start: 2024-09-04 | End: 2024-10-04

## 2024-08-05 RX ORDER — HYDROCODONE BITARTRATE AND ACETAMINOPHEN 5; 325 MG/1; MG/1
1 TABLET ORAL 2 TIMES DAILY PRN
Qty: 60 TABLET | Refills: 0 | Status: SHIPPED | OUTPATIENT
Start: 2024-10-04 | End: 2024-11-03

## 2024-08-05 RX ORDER — SUMATRIPTAN 100 MG/1
TABLET, FILM COATED ORAL
Qty: 18 TABLET | Refills: 11 | Status: SHIPPED | OUTPATIENT
Start: 2024-08-05

## 2024-08-05 RX ORDER — HYDROCODONE BITARTRATE AND ACETAMINOPHEN 5; 325 MG/1; MG/1
1 TABLET ORAL 2 TIMES DAILY PRN
Qty: 60 TABLET | Refills: 0 | Status: SHIPPED | OUTPATIENT
Start: 2024-08-05 | End: 2024-09-04

## 2024-08-05 RX ORDER — PROPRANOLOL HCL 60 MG
60 CAPSULE, EXTENDED RELEASE 24HR ORAL DAILY
Qty: 90 CAPSULE | Refills: 3 | Status: SHIPPED | OUTPATIENT
Start: 2024-08-05

## 2024-08-05 RX ORDER — CYCLOBENZAPRINE HCL 10 MG
10 TABLET ORAL 3 TIMES DAILY PRN
Qty: 30 TABLET | Refills: 5 | Status: SHIPPED | OUTPATIENT
Start: 2024-08-05

## 2024-08-05 RX ORDER — TOPIRAMATE 100 MG/1
200 TABLET, FILM COATED ORAL DAILY
Qty: 180 TABLET | Refills: 3 | Status: SHIPPED | OUTPATIENT
Start: 2024-08-05

## 2024-08-05 ASSESSMENT — ANXIETY QUESTIONNAIRES
2. NOT BEING ABLE TO STOP OR CONTROL WORRYING: NOT AT ALL
1. FEELING NERVOUS, ANXIOUS, OR ON EDGE: NOT AT ALL
6. BECOMING EASILY ANNOYED OR IRRITABLE: NOT AT ALL
5. BEING SO RESTLESS THAT IT IS HARD TO SIT STILL: NEARLY EVERY DAY
GAD7 TOTAL SCORE: 6
7. FEELING AFRAID AS IF SOMETHING AWFUL MIGHT HAPPEN: NOT AT ALL
GAD7 TOTAL SCORE: 6
3. WORRYING TOO MUCH ABOUT DIFFERENT THINGS: NOT AT ALL

## 2024-08-05 ASSESSMENT — PATIENT HEALTH QUESTIONNAIRE - PHQ9
SUM OF ALL RESPONSES TO PHQ QUESTIONS 1-9: 8
5. POOR APPETITE OR OVEREATING: NEARLY EVERY DAY

## 2024-08-05 ASSESSMENT — PAIN SCALES - GENERAL: PAINLEVEL: NO PAIN (0)

## 2024-08-05 NOTE — PROGRESS NOTES
Assessment & Plan     Other chronic back pain   Loss of vertebral disc height  Patient has been seeing a chiropractor regularly and getting no real improvement from this, would like to see spine - will start with medical spine to begin with.  No red flags that would indicate need for advanced imaging at this time.   Consider PT  - XR Cervical Spine 2/3 Views; Future  - XR Thoracic Spine 2 Views; Future  - Spine  Referral; Future    Arthropathy   Has been out of narcotics x 1 month or so, finds that she's better able to function while taking twice daily.  She cancelled four appointments between the last visit and now.      - HYDROcodone-acetaminophen (NORCO) 5-325 MG tablet; Take 1 tablet by mouth 2 times daily as needed for severe pain  - HYDROcodone-acetaminophen (NORCO) 5-325 MG tablet; Take 1 tablet by mouth 2 times daily as needed for severe pain  - HYDROcodone-acetaminophen (NORCO) 5-325 MG tablet; Take 1 tablet by mouth 2 times daily as needed for severe pain    Stage 3a chronic kidney disease (H)  Will continue monitoring  - Hemoglobin; Future    Migraine without aura and without status migrainosus, not intractable  At max dose of topiramate and blood pressure/HR a bit low on the propranolol  Headaches a bit better recently on magnesium supplement over the counter, will see how that continues to go.      - SUMAtriptan (IMITREX) 100 MG tablet; TAKE 1 TAB AT ONSET OF MIGRAINE MAY REPEAT AFTER 2 HOURS (MAX 200MG PER 24 HOURS)  - propranolol ER (INDERAL LA) 60 MG 24 hr capsule; Take 1 capsule (60 mg) by mouth daily  - topiramate (TOPAMAX) 100 MG tablet; Take 2 tablets (200 mg) by mouth daily    Back muscle spasm     - cyclobenzaprine (FLEXERIL) 10 MG tablet; Take 1 tablet (10 mg) by mouth 3 times daily as needed for muscle spasms    Chronic pain syndrome  As above, due for UDS today  - HYDROcodone-acetaminophen (NORCO) 5-325 MG tablet; Take 1 tablet by mouth 2 times daily as needed for severe pain  -  "HYDROcodone-acetaminophen (NORCO) 5-325 MG tablet; Take 1 tablet by mouth 2 times daily as needed for severe pain  - HYDROcodone-acetaminophen (NORCO) 5-325 MG tablet; Take 1 tablet by mouth 2 times daily as needed for severe pain  - Urine Drug Screen Clinic; Future  - XR Cervical Spine 2/3 Views; Future  - XR Thoracic Spine 2 Views; Future          BMI  Estimated body mass index is 25.42 kg/m  as calculated from the following:    Height as of this encounter: 1.397 m (4' 7\").    Weight as of this encounter: 49.6 kg (109 lb 6 oz).             Devon Atkinson is a 63 year old, presenting for the following health issues:  No chief complaint on file.        8/5/2024    11:25 AM   Additional Questions   Roomed by Sugar CHOW CMA   Accompanied by Self     HPI     - She is also requesting refill of Norco    Migraine   Since your last clinic visit, how have your headaches changed?  Worsened  How often are you getting headaches or migraines? She notes that she wakes in the morning feeling like she is going to get migraine, she takes a Imitrex and pain resolves   Are you able to do normal daily activities when you have a migraine? Yes  Are you taking rescue/relief medications? (Select all that apply) sumatriptan (Imitrex)  How helpful is your rescue/relief medication?  I get total relief  Are you taking any medications to prevent migraines? (Select all that apply)  Topamax  In the past 4 weeks, how often have you gone to urgent care or the emergency room because of your headaches?  0      Pain History:  When did you first notice your pain? Chronic   Have you seen this provider for your pain in the past? Yes   Where in your body do you have pain? Middle back  Are you seeing anyone else for your pain? No        1/6/2023    11:30 AM 11/10/2023    11:39 AM 8/5/2024    11:34 AM   PHQ-9 SCORE   PHQ-9 Total Score 5 1 8           9/13/2023     1:15 PM 11/10/2023    11:39 AM 8/5/2024    11:34 AM   RON-7 SCORE   Total Score 3 " "(minimal anxiety)     Total Score 3 1 6               1/6/2023    11:30 AM 11/10/2023    11:39 AM 8/5/2024    11:34 AM   PHQ-9 SCORE   PHQ-9 Total Score 5 1 8           9/13/2023     1:15 PM 11/10/2023    11:39 AM 8/5/2024    11:34 AM   RON-7 SCORE   Total Score 3 (minimal anxiety)     Total Score 3 1 6           1/6/2023    11:06 AM 11/10/2023    11:39 AM 8/5/2024    11:34 AM   PEG Score   PEG Total Score 9.33 8.33 8       Chronic Pain Follow Up:    Location of pain: Middle back, near bra line  Analgesia/pain control:    - Recent changes:  Worsening right hip pain and neck pain    - Overall control: Tolerable with discomfort    - Current treatments: Opioids and muscle relaxer's, Muscle Mist, she notes that she has been drinking a glass of wine to help her sleep  Adherence:     - Do you ever take more pain medicine than prescribed? No    - When did you take your last dose of pain medicine?  yesterday   Adverse effects: No   PDMP Review         Value Time User    State PDMP site checked  Yes 7/18/2024  1:52 PM Heather Mckenzie MD          Last CSA Agreement:   CSA -- Patient Level:     [Media Unavailable] Controlled Substance Agreement - Opioid - Scan on 2/23/2024 10:10 AM   [Media Unavailable] Controlled Substance Agreement - Opioid - Scan on 1/6/2023 12:13 PM   [Media Unavailable] Controlled Substance Agreement - Opioid - Scan on 2/14/2022 10:07 AM   [Media Unavailable] Controlled Substance Agreement - Opioid - Scan on 2/11/2022  9:26 AM   [Media Unavailable] Controlled Substance Agreement - Opioid - Scan on 5/27/2021  2:04 PM: opioid consent       Last UDS: 9/15/2023                  Review of Systems  Constitutional, HEENT, cardiovascular, pulmonary, gi and gu systems are negative, except as otherwise noted.      Objective    /74   Pulse 54   Temp 97.6  F (36.4  C) (Tympanic)   Resp 16   Ht 1.397 m (4' 7\")   Wt 49.6 kg (109 lb 6 oz)   SpO2 98%   BMI 25.42 kg/m    Body mass index is 25.42 " kg/m .  Physical Exam   GENERAL: alert and no distress  NECK: no adenopathy, no asymmetry, masses, or scars  RESP: lungs clear to auscultation - no rales, rhonchi or wheezes  CV: regular rate and rhythm, normal S1 S2, no S3 or S4, no murmur, click or rub, no peripheral edema  ABDOMEN: soft, nontender, no hepatosplenomegaly, no masses and bowel sounds normal  MS: no gross musculoskeletal defects noted, no edema  ORTHO:   Cervical spine: very limited range of motion in flexion/extension  Tender to palpation along thoracic spinous processes            Signed Electronically by: Heather Mckenzie MD

## 2024-08-09 ENCOUNTER — LAB (OUTPATIENT)
Dept: LAB | Facility: CLINIC | Age: 64
End: 2024-08-09
Payer: COMMERCIAL

## 2024-08-09 DIAGNOSIS — G89.4 CHRONIC PAIN SYNDROME: ICD-10-CM

## 2024-08-09 LAB
AMPHETAMINES UR QL: NOT DETECTED
BARBITURATES UR QL SCN: NOT DETECTED
BENZODIAZ UR QL SCN: NOT DETECTED
BUPRENORPHINE UR QL: NOT DETECTED
CANNABINOIDS UR QL: NOT DETECTED
COCAINE UR QL SCN: NOT DETECTED
D-METHAMPHET UR QL: NOT DETECTED
METHADONE UR QL SCN: NOT DETECTED
OPIATES UR QL SCN: DETECTED
OXYCODONE UR QL SCN: NOT DETECTED
PCP UR QL SCN: NOT DETECTED
TRICYCLICS UR QL SCN: NOT DETECTED

## 2024-08-09 PROCEDURE — 80306 DRUG TEST PRSMV INSTRMNT: CPT

## 2024-08-12 ENCOUNTER — PATIENT OUTREACH (OUTPATIENT)
Dept: CARE COORDINATION | Facility: CLINIC | Age: 64
End: 2024-08-12
Payer: COMMERCIAL

## 2024-08-14 ENCOUNTER — PATIENT OUTREACH (OUTPATIENT)
Dept: CARE COORDINATION | Facility: CLINIC | Age: 64
End: 2024-08-14

## 2024-08-15 ENCOUNTER — LAB (OUTPATIENT)
Dept: LAB | Facility: CLINIC | Age: 64
End: 2024-08-15
Payer: COMMERCIAL

## 2024-08-15 DIAGNOSIS — N18.31 STAGE 3A CHRONIC KIDNEY DISEASE (H): Primary | ICD-10-CM

## 2024-08-15 LAB
ANION GAP SERPL CALCULATED.3IONS-SCNC: 11 MMOL/L (ref 7–15)
BUN SERPL-MCNC: 20.9 MG/DL (ref 8–23)
CALCIUM SERPL-MCNC: 9.2 MG/DL (ref 8.8–10.4)
CHLORIDE SERPL-SCNC: 107 MMOL/L (ref 98–107)
CHOLEST SERPL-MCNC: 247 MG/DL
CREAT SERPL-MCNC: 1.02 MG/DL (ref 0.51–0.95)
CREAT UR-MCNC: 66.2 MG/DL
EGFRCR SERPLBLD CKD-EPI 2021: 62 ML/MIN/1.73M2
FASTING STATUS PATIENT QL REPORTED: NO
FASTING STATUS PATIENT QL REPORTED: NO
GLUCOSE SERPL-MCNC: 118 MG/DL (ref 70–99)
HCO3 SERPL-SCNC: 26 MMOL/L (ref 22–29)
HDLC SERPL-MCNC: 69 MG/DL
HGB BLD-MCNC: 13 G/DL (ref 11.7–15.7)
LDLC SERPL CALC-MCNC: 160 MG/DL
MICROALBUMIN UR-MCNC: <12 MG/L
MICROALBUMIN/CREAT UR: NORMAL MG/G{CREAT}
NONHDLC SERPL-MCNC: 178 MG/DL
POTASSIUM SERPL-SCNC: 4.3 MMOL/L (ref 3.4–5.3)
SODIUM SERPL-SCNC: 144 MMOL/L (ref 135–145)
TRIGL SERPL-MCNC: 90 MG/DL

## 2024-08-15 PROCEDURE — 80061 LIPID PANEL: CPT

## 2024-08-15 PROCEDURE — 36415 COLL VENOUS BLD VENIPUNCTURE: CPT

## 2024-08-15 PROCEDURE — 82570 ASSAY OF URINE CREATININE: CPT

## 2024-08-15 PROCEDURE — 80048 BASIC METABOLIC PNL TOTAL CA: CPT

## 2024-08-15 PROCEDURE — 82043 UR ALBUMIN QUANTITATIVE: CPT

## 2024-08-15 PROCEDURE — 85018 HEMOGLOBIN: CPT

## 2024-08-15 NOTE — RESULT ENCOUNTER NOTE
[place],    These labs are normal or acceptable.    Please contact my office if you have questions.    Heather Mckenzie M.D.

## 2024-08-15 NOTE — LETTER
August 21, 2024      [place] Mckenzie  [address] [place] [address]        Dear [place],    We are writing to inform you of your test results.    The American Heart Association and American College of Cardiology recommends statins for anyone who's 10 year risk exceeds 7.5%, your risk is 2.8%, therefore a statin (cholesterol lowering drug) is not recommended at this time.  Kidney function stable.    Blood sugar is in the pre-diabetic range.  Work on lower sugar, lower carbohydrate options.   Next time you are in we can get an HgbA1c which measures average blood sugar over 3 months.    Resulted Orders   Hemoglobin   Result Value Ref Range    Hemoglobin 13.0 11.7 - 15.7 g/dL   BASIC METABOLIC PANEL   Result Value Ref Range    Sodium 144 135 - 145 mmol/L    Potassium 4.3 3.4 - 5.3 mmol/L    Chloride 107 98 - 107 mmol/L    Carbon Dioxide (CO2) 26 22 - 29 mmol/L    Anion Gap 11 7 - 15 mmol/L    Urea Nitrogen 20.9 8.0 - 23.0 mg/dL    Creatinine 1.02 (H) 0.51 - 0.95 mg/dL    GFR Estimate 62 >60 mL/min/1.73m2      Comment:      eGFR calculated using 2021 CKD-EPI equation.    Calcium 9.2 8.8 - 10.4 mg/dL      Comment:      Reference intervals for this test were updated on 7/16/2024 to reflect our healthy population more accurately. There may be differences in the flagging of prior results with similar values performed with this method. Those prior results can be interpreted in the context of the updated reference intervals.    Glucose 118 (H) 70 - 99 mg/dL    Patient Fasting > 8hrs? No    Lipid panel reflex to direct LDL Non-fasting   Result Value Ref Range    Cholesterol 247 (H) <200 mg/dL    Triglycerides 90 <150 mg/dL    Direct Measure HDL 69 >=50 mg/dL    LDL Cholesterol Calculated 160 (H) <=100 mg/dL    Non HDL Cholesterol 178 (H) <130 mg/dL    Patient Fasting > 8hrs? No     Narrative    Cholesterol  Desirable:  <200 mg/dL    Triglycerides  Normal:  Less than 150 mg/dL  Borderline High:  150-199 mg/dL  High:   200-499 mg/dL  Very High:  Greater than or equal to 500 mg/dL    Direct Measure HDL  Female:  Greater than or equal to 50 mg/dL   Male:  Greater than or equal to 40 mg/dL    LDL Cholesterol  Desirable:  <100mg/dL  Above Desirable:  100-129 mg/dL   Borderline High:  130-159 mg/dL   High:  160-189 mg/dL   Very High:  >= 190 mg/dL    Non HDL Cholesterol  Desirable:  130 mg/dL  Above Desirable:  130-159 mg/dL  Borderline High:  160-189 mg/dL  High:  190-219 mg/dL  Very High:  Greater than or equal to 220 mg/dL   Albumin Random Urine Quantitative with Creat Ratio   Result Value Ref Range    Creatinine Urine mg/dL 66.2 mg/dL      Comment:      The reference ranges have not been established in urine creatinine. The results should be integrated into the clinical context for interpretation.    Albumin Urine mg/L <12.0 mg/L      Comment:      The reference ranges have not been established in urine albumin. The results should be integrated into the clinical context for interpretation.    Albumin Urine mg/g Cr        Comment:      Unable to calculate, urine albumin and/or urine creatinine is outside detectable limits.  Microalbuminuria is defined as an albumin:creatinine ratio of 17 to 299 for males and 25 to 299 for females. A ratio of albumin:creatinine of 300 or higher is indicative of overt proteinuria.  Due to biologic variability, positive results should be confirmed by a second, first-morning random or 24-hour timed urine specimen. If there is discrepancy, a third specimen is recommended. When 2 out of 3 results are in the microalbuminuria range, this is evidence for incipient nephropathy and warrants increased efforts at glucose control, blood pressure control, and institution of therapy with an angiotensin-converting-enzyme (ACE) inhibitor (if the patient can tolerate it).         If you have any questions or concerns, please call the clinic at the number listed above.       Sincerely,      Heather Mckenzie,  MD

## 2024-08-16 ENCOUNTER — TELEPHONE (OUTPATIENT)
Dept: FAMILY MEDICINE | Facility: CLINIC | Age: 64
End: 2024-08-16
Payer: COMMERCIAL

## 2024-08-16 NOTE — TELEPHONE ENCOUNTER
Dr. Mckenzie,    Patient calling in regards to X-Ray results from 08/06. Read result note. Patient wondering what next step is as she states her pain is not relieved.    Thank you  Cirilo DUFF RN  [facility]

## 2024-08-19 NOTE — TELEPHONE ENCOUNTER
She was given a referral to spine at her last visit with me, the next step is seeing them.    Heather Mckenzie M.D.

## 2024-08-20 NOTE — TELEPHONE ENCOUNTER
SPINE  REFERRAL   Priority: Routine: Next available opening    - Other chronic back pain please call [phone].     Patient asked RN to review X-ray result notes and lab result notes.    RN informed patient of the result notes.    Patient reports having leg issues and wants to be evaluated by Dr. Mckenzie. RN scheduled patient for same day office visit for 9/3/24 and will have A1C done at that time.      Marilyn Collazo RN on 8/20/2024 at 11:25 AM

## 2024-08-28 ENCOUNTER — PATIENT OUTREACH (OUTPATIENT)
Dept: CARE COORDINATION | Facility: CLINIC | Age: 64
End: 2024-08-28
Payer: COMMERCIAL

## 2024-08-29 ENCOUNTER — TRANSFERRED RECORDS (OUTPATIENT)
Dept: HEALTH INFORMATION MANAGEMENT | Facility: CLINIC | Age: 64
End: 2024-08-29

## 2024-09-03 ENCOUNTER — ANCILLARY PROCEDURE (OUTPATIENT)
Dept: GENERAL RADIOLOGY | Facility: CLINIC | Age: 64
End: 2024-09-03
Attending: FAMILY MEDICINE
Payer: COMMERCIAL

## 2024-09-03 ENCOUNTER — OFFICE VISIT (OUTPATIENT)
Dept: FAMILY MEDICINE | Facility: CLINIC | Age: 64
End: 2024-09-03
Payer: COMMERCIAL

## 2024-09-03 VITALS
OXYGEN SATURATION: 98 % | HEART RATE: 57 BPM | SYSTOLIC BLOOD PRESSURE: 124 MMHG | HEIGHT: 55 IN | DIASTOLIC BLOOD PRESSURE: 70 MMHG | WEIGHT: 107 LBS | RESPIRATION RATE: 20 BRPM | BODY MASS INDEX: 24.76 KG/M2 | TEMPERATURE: 97.3 F

## 2024-09-03 DIAGNOSIS — M25.551 CHRONIC RIGHT HIP PAIN: Primary | ICD-10-CM

## 2024-09-03 DIAGNOSIS — G89.29 CHRONIC RIGHT HIP PAIN: Primary | ICD-10-CM

## 2024-09-03 DIAGNOSIS — M25.551 CHRONIC RIGHT HIP PAIN: ICD-10-CM

## 2024-09-03 DIAGNOSIS — R73.01 ELEVATED FASTING GLUCOSE: ICD-10-CM

## 2024-09-03 DIAGNOSIS — M16.11 PRIMARY OSTEOARTHRITIS OF RIGHT HIP: ICD-10-CM

## 2024-09-03 DIAGNOSIS — G89.29 CHRONIC RIGHT HIP PAIN: ICD-10-CM

## 2024-09-03 LAB — HBA1C MFR BLD: 5.9 % (ref 0–5.6)

## 2024-09-03 PROCEDURE — 36415 COLL VENOUS BLD VENIPUNCTURE: CPT | Performed by: FAMILY MEDICINE

## 2024-09-03 PROCEDURE — 73502 X-RAY EXAM HIP UNI 2-3 VIEWS: CPT | Performed by: STUDENT IN AN ORGANIZED HEALTH CARE EDUCATION/TRAINING PROGRAM

## 2024-09-03 PROCEDURE — 83036 HEMOGLOBIN GLYCOSYLATED A1C: CPT | Performed by: FAMILY MEDICINE

## 2024-09-03 PROCEDURE — 99214 OFFICE O/P EST MOD 30 MIN: CPT | Performed by: FAMILY MEDICINE

## 2024-09-03 ASSESSMENT — PAIN SCALES - GENERAL: PAINLEVEL: NO PAIN (0)

## 2024-09-03 NOTE — PROGRESS NOTES
"  Assessment & Plan     Chronic right hip pain  Moderate OA of the right hip  Will have her see ortho to discuss treatment options  - XR Hip Right 2-3 Views; Future  - Orthopedic  Referral; Future    Primary osteoarthritis of right hip   As above  - Orthopedic  Referral; Future    Elevated fasting glucose  Blood sugars high the last few times it's been taken, will get an HgbA1c for more evaluation  - Hemoglobin A1c; Future                Subjective   China is a 63 year old, presenting for the following health issues:  Musculoskeletal Problem        9/3/2024    11:45 AM   Additional Questions   Roomed by Sugar CHOW CMA   Accompanied by Self     HPI     - Right hip/outer thigh pain x years, she notes pain is intermittent and is worse when staying seated for longer periods and then going to stand up. Pain does radiate to knee. She notes that this is somewhat better today after dancing.    Seemed to start in the groin and has \"worked it way out\".  This pain has been a problem for a few years, getting worse more recently. Leg feels like it wants to give out at times.  That is the most concerning part to her.      Worse after sitting for longer periods of time.        - Balance issues. No LOC/falls.          Review of Systems  Constitutional, HEENT, cardiovascular, pulmonary, gi and gu systems are negative, except as otherwise noted.      Objective    /70   Pulse 57   Temp 97.3  F (36.3  C) (Tympanic)   Resp 20   Ht 1.397 m (4' 7\")   Wt 48.5 kg (107 lb)   SpO2 98%   BMI 24.87 kg/m    Body mass index is 24.87 kg/m .  Physical Exam   GENERAL: alert and no distress  NECK: no adenopathy, no asymmetry, masses, or scars  RESP: lungs clear to auscultation - no rales, rhonchi or wheezes  CV: regular rate and rhythm, normal S1 S2, no S3 or S4, no murmur, click or rub, no peripheral edema  ABDOMEN: soft, nontender, no hepatosplenomegaly, no masses and bowel sounds normal  MS: no gross musculoskeletal " defects noted, no edema    Xray hip:  moderate oa          Signed Electronically by: Heather Mckenzie MD

## 2024-09-04 ENCOUNTER — PATIENT OUTREACH (OUTPATIENT)
Dept: CARE COORDINATION | Facility: CLINIC | Age: 64
End: 2024-09-04
Payer: COMMERCIAL

## 2024-09-06 ENCOUNTER — OFFICE VISIT (OUTPATIENT)
Dept: PHYSICAL MEDICINE AND REHAB | Facility: CLINIC | Age: 64
End: 2024-09-06
Attending: FAMILY MEDICINE
Payer: COMMERCIAL

## 2024-09-06 VITALS
WEIGHT: 107 LBS | HEIGHT: 55 IN | SYSTOLIC BLOOD PRESSURE: 136 MMHG | HEART RATE: 55 BPM | DIASTOLIC BLOOD PRESSURE: 68 MMHG | BODY MASS INDEX: 24.76 KG/M2

## 2024-09-06 DIAGNOSIS — G89.29 CHRONIC RIGHT-SIDED THORACIC BACK PAIN: Primary | ICD-10-CM

## 2024-09-06 DIAGNOSIS — G89.29 OTHER CHRONIC BACK PAIN: ICD-10-CM

## 2024-09-06 DIAGNOSIS — M54.89 OTHER CHRONIC BACK PAIN: ICD-10-CM

## 2024-09-06 DIAGNOSIS — M54.6 CHRONIC RIGHT-SIDED THORACIC BACK PAIN: Primary | ICD-10-CM

## 2024-09-06 PROCEDURE — 99203 OFFICE O/P NEW LOW 30 MIN: CPT | Performed by: NURSE PRACTITIONER

## 2024-09-06 ASSESSMENT — PAIN SCALES - GENERAL: PAINLEVEL: MODERATE PAIN (4)
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
## 2024-09-06 NOTE — PATIENT INSTRUCTIONS
Imaging (Xray, CT, or MRI) has been ordered today.   Radiology will call you to schedule. Please call below if you do not hear from them in the next couple of days.     [facility] Radiology Scheduling:  Please call [phone] to schedule your image(s) (select option #1).    There are 3 different locations:    [facility]  [address] [address]    [facility] [place]  [address], [address]   [place] [address]    [facility]  [address] [address]         ~You have been referred for Physical Therapy to [facility]. They will call you to schedule an appointment.      Scheduling phone number is [phone] for [facility] [place], [place], or [place] location.  If you have not heard from the scheduling office within 2 business days, please call [phone] for ALL other locations.    Discussed the importance of core strengthening, ROM, stretching exercises and how each of these entities is important in decreasing pain and improving long term spine health.  The purpose of physical therapy is to teach you an individualized home exercise program.  These exercises need to be performed every day in order to decrease pain and prevent future occurrences of pain.             ~Please call our [facility] Nurse Navigation line [phone] with any questions or concerns about your treatment plan, if symptoms worsen and you would like to be seen urgently, or if you have any new or worsening numbness, weakness, or problems controlling bladder and bowel function.  ~You are also welcome to contact Annette Wiley via [employer], but please be aware that responses to [employer] message may take 2-3 days due to the high volume of patients seen in clinic.
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
## 2024-09-16 ENCOUNTER — ANCILLARY PROCEDURE (OUTPATIENT)
Dept: MAMMOGRAPHY | Facility: CLINIC | Age: 64
End: 2024-09-16
Attending: FAMILY MEDICINE
Payer: COMMERCIAL

## 2024-09-16 DIAGNOSIS — Z12.31 VISIT FOR SCREENING MAMMOGRAM: ICD-10-CM

## 2024-09-16 PROCEDURE — 77063 BREAST TOMOSYNTHESIS BI: CPT | Mod: TC | Performed by: RADIOLOGY

## 2024-09-16 PROCEDURE — 77067 SCR MAMMO BI INCL CAD: CPT | Mod: TC | Performed by: RADIOLOGY

## 2024-09-23 NOTE — PROGRESS NOTES
ASSESSMENT & PLAN    China was seen today for pain.    Diagnoses and all orders for this visit:    Chronic pain of right knee  -     XR Knee Standing AP Bilat [place] Bilat Lat Right; Future  -     MR Knee Right w/o Contrast; Future    Chronic right hip pain  -     Orthopedic  Referral    Primary osteoarthritis of right hip  -     Orthopedic  Referral  -     Physical Therapy  Referral; Future      This issue is chronic and Unchanged.    # Right hip pain : China Mckenzie  was seen today for right knee and hip pain. Symptoms had been going on for 10+ years. On examination there are positive findings of pain to palpation of the anterior and lateral hip/ greater troch. Imaging findings showed moderate OA of the hip and knee. Likely cause of patient's condition due to osteoarthritis of the hip as well as greater troch bursitis/ glute med/I band tendinopathy. Counseled patient on nature of condition and treatment options.  -Physical therapy referral  -May take Tylenol 1000mg 3 times per day as needed  -Ask PCP about diclofenac cream  -Can consider ultrasound-guided steroid injection into right hip, if not improving with physical therapy     #Right knee pain:   Symptoms had been going on for 10+ years. On examination there are positive findings of pain to palpation of the medial joint line of the knee. Imaging findings showed moderate OA of the knee. Likely cause of patient's condition osteoarthritis, but there is concern for a meniscal tear with mechanical symptoms based on patient's description of locking episodes  -MRI to evaluate meniscus  -May take Tylenol 1000mg 3 times per day as needed  -Ask PCP about diclofenac cream    Follow-up: 3-7 days after knee MRI      Maurice Villarreal MD  [facility]    -----  Chief Complaint   Patient presents with    Right Hip - Pain       SUBJECTIVE  China Mckenzie is a/an 64 year old female who is seen in  "consultation at the request of  Heather Mckenzie M.D. for evaluation of right hip and right knee.     The patient is seen by themselves.    Right Knee:  Onset: 10 years(s) ago. Reports insidious onset without acute precipitating event. She reports cracking in the knee that feels like relief. She reports multiple episodes of her knee locking in flexion and she has forced it into extension.  Location of Pain: right medial knee, joint line  Worsened by: walking  Treatments tried: no treatments tried  Associated symptoms: locking in flexion    Right hip:  Onset: 10 year(s) ago. Reports insidious onset without acute precipitating event.  Location of Pain: right groin, lateral hip  Worsened by: walking, switching pedals while driving, stairs  Better with: stretching  Treatments tried: chiropractic care, stretching  Associated symptoms: no distal numbness or tingling; denies swelling or warmth; cracking, stiffness, tightness    Orthopedic/Surgical history: NO  Social History/Occupation: caregiver for handicapped clients      REVIEW OF SYSTEMS:  Review of Systems    OBJECTIVE:  Ht 1.397 m (4' 7\")   Wt 48.5 kg (107 lb)   BMI 24.87 kg/m     General: healthy, alert and in no distress  Skin: no suspicious lesions or rash.  CV: distal perfusion intact   Resp: normal respiratory effort without conversational dyspnea   Psych: normal mood and affect  Gait: NORMAL  Neuro: Normal light sensory exam of right lower extremity     RIGHT HIP  Inspection:    No swelling, bruising, discoloration, or obvious deformity or asymmetry  Palpation:    Tender about the anterior groin/joint line, greater trochanteric region, and gluteus medius insertion. Otherwise all other landmarks are nontender.  Active Range of Motion:     Flexion full, extension full / IR full with pain / ER  full  Strength:  Grossly normal  Special Tests:    Positive: anterior impingement (FADIR)    Negative: ASHLEY Walker    RIGHT KNEE  Inspection:    Normal alignment; no " edema, erythema, or ecchymosis present  Palpation:    Tender about the medial joint line. Remainder of bony and ligamentous landmarks are nontender.    Trace effusion is present    Patellofemoral crepitus is Present  Range of Motion:     00 extension to 1200 flexion  Strength:  Grossly normal    Extensor mechanism intact  Special Tests:    Positive: Sofia's    Negative: MCL/valgus stress (0 & 30 deg), LCL/varus stress (0 & 30 deg), Lachman's, anterior drawer, posterior drawer       RADIOLOGY:  Final results and radiologist's interpretation, available in the [facility] health record.  Images were reviewed with the patient in the office today.  My personal interpretation of the performed imaging: moderate OA of the knee and hip    XR HIP RIGHT 2-3 VIEWS 9/3/2024 12:08 PM      HISTORY: right hip pain x years, no injury; Chronic right hip pain;  Chronic right hip pain     COMPARISON: None.                                                                       IMPRESSION:  Moderate right hip osteoarthritis with joint space narrowing and  formation of marginal osteophytes. Normal alignment. Degenerative  changes of the lower lumbar spine.      Mingo Chairez MD

## 2024-09-24 ENCOUNTER — ANCILLARY PROCEDURE (OUTPATIENT)
Dept: GENERAL RADIOLOGY | Facility: CLINIC | Age: 64
End: 2024-09-24
Attending: STUDENT IN AN ORGANIZED HEALTH CARE EDUCATION/TRAINING PROGRAM
Payer: COMMERCIAL

## 2024-09-24 ENCOUNTER — OFFICE VISIT (OUTPATIENT)
Dept: ORTHOPEDICS | Facility: CLINIC | Age: 64
End: 2024-09-24
Attending: FAMILY MEDICINE
Payer: COMMERCIAL

## 2024-09-24 VITALS — WEIGHT: 107 LBS | BODY MASS INDEX: 24.76 KG/M2 | HEIGHT: 55 IN

## 2024-09-24 DIAGNOSIS — M25.551 CHRONIC RIGHT HIP PAIN: ICD-10-CM

## 2024-09-24 DIAGNOSIS — G89.29 CHRONIC PAIN OF RIGHT KNEE: ICD-10-CM

## 2024-09-24 DIAGNOSIS — M16.11 PRIMARY OSTEOARTHRITIS OF RIGHT HIP: ICD-10-CM

## 2024-09-24 DIAGNOSIS — G89.29 CHRONIC PAIN OF RIGHT KNEE: Primary | ICD-10-CM

## 2024-09-24 DIAGNOSIS — M25.561 CHRONIC PAIN OF RIGHT KNEE: ICD-10-CM

## 2024-09-24 DIAGNOSIS — G89.29 CHRONIC RIGHT HIP PAIN: ICD-10-CM

## 2024-09-24 DIAGNOSIS — M25.561 CHRONIC PAIN OF RIGHT KNEE: Primary | ICD-10-CM

## 2024-09-24 PROCEDURE — 99204 OFFICE O/P NEW MOD 45 MIN: CPT | Performed by: STUDENT IN AN ORGANIZED HEALTH CARE EDUCATION/TRAINING PROGRAM

## 2024-09-24 PROCEDURE — 73562 X-RAY EXAM OF KNEE 3: CPT | Mod: TC | Performed by: INTERNAL MEDICINE

## 2024-09-24 NOTE — LETTER
9/24/2024      China Mckenzie  [id number] [place] [address]      Dear Colleague,    Thank you for referring your patient, China Mckenzie, to the [facility] [place]. Please see a copy of my visit note below.    ASSESSMENT & PLAN    China was seen today for pain.    Diagnoses and all orders for this visit:    Chronic pain of right knee  -     XR Knee Standing AP Bilat [place] Bilat Lat Right; Future  -     MR Knee Right w/o Contrast; Future    Chronic right hip pain  -     Orthopedic  Referral    Primary osteoarthritis of right hip  -     Orthopedic  Referral  -     Physical Therapy  Referral; Future      This issue is chronic and Unchanged.    # Right hip pain : China Mckenzie  was seen today for right knee and hip pain. Symptoms had been going on for 10+ years. On examination there are positive findings of pain to palpation of the anterior and lateral hip/ greater troch. Imaging findings showed moderate OA of the hip and knee. Likely cause of patient's condition due to osteoarthritis of the hip as well as greater troch bursitis/ glute med/I band tendinopathy. Counseled patient on nature of condition and treatment options.  -Physical therapy referral  -May take Tylenol 1000mg 3 times per day as needed  -Ask PCP about diclofenac cream  -Can consider ultrasound-guided steroid injection into right hip, if not improving with physical therapy     #Right knee pain:   Symptoms had been going on for 10+ years. On examination there are positive findings of pain to palpation of the medial joint line of the knee. Imaging findings showed moderate OA of the knee. Likely cause of patient's condition osteoarthritis, but there is concern for a meniscal tear with mechanical symptoms based on patient's description of locking episodes  -MRI to evaluate meniscus  -May take Tylenol 1000mg 3 times per day as needed  -Ask PCP about diclofenac cream    Follow-up:  "3-7 days after knee MRI      Maurice Villarreal MD  [facility] [place]    -----  Chief Complaint   Patient presents with     Right Hip - Pain       SUBJECTIVE  China Mckenzie is a/an 64 year old female who is seen in consultation at the request of  Heather Mckenzie M.D. for evaluation of right hip and right knee.     The patient is seen by themselves.    Right Knee:  Onset: 10 years(s) ago. Reports insidious onset without acute precipitating event. She reports cracking in the knee that feels like relief. She reports multiple episodes of her knee locking in flexion and she has forced it into extension.  Location of Pain: right medial knee, joint line  Worsened by: walking  Treatments tried: no treatments tried  Associated symptoms: locking in flexion    Right hip:  Onset: 10 year(s) ago. Reports insidious onset without acute precipitating event.  Location of Pain: right groin, lateral hip  Worsened by: walking, switching pedals while driving, stairs  Better with: stretching  Treatments tried: chiropractic care, stretching  Associated symptoms: no distal numbness or tingling; denies swelling or warmth; cracking, stiffness, tightness    Orthopedic/Surgical history: NO  Social History/Occupation: caregiver for handicapped clients      REVIEW OF SYSTEMS:  Review of Systems    OBJECTIVE:  Ht 1.397 m (4' 7\")   Wt 48.5 kg (107 lb)   BMI 24.87 kg/m     General: healthy, alert and in no distress  Skin: no suspicious lesions or rash.  CV: distal perfusion intact   Resp: normal respiratory effort without conversational dyspnea   Psych: normal mood and affect  Gait: NORMAL  Neuro: Normal light sensory exam of right lower extremity     RIGHT HIP  Inspection:    No swelling, bruising, discoloration, or obvious deformity or asymmetry  Palpation:    Tender about the anterior groin/joint line, greater trochanteric region, and gluteus medius insertion. Otherwise all other landmarks are " nontender.  Active Range of Motion:     Flexion full, extension full / IR full with pain / ER  full  Strength:  Grossly normal  Special Tests:    Positive: anterior impingement (FADIR)    Negative: ASHLEY Walker    RIGHT KNEE  Inspection:    Normal alignment; no edema, erythema, or ecchymosis present  Palpation:    Tender about the medial joint line. Remainder of bony and ligamentous landmarks are nontender.    Trace effusion is present    Patellofemoral crepitus is Present  Range of Motion:     00 extension to 1200 flexion  Strength:  Grossly normal    Extensor mechanism intact  Special Tests:    Positive: Sofia's    Negative: MCL/valgus stress (0 & 30 deg), LCL/varus stress (0 & 30 deg), Lachman's, anterior drawer, posterior drawer       RADIOLOGY:  Final results and radiologist's interpretation, available in the [facility] health record.  Images were reviewed with the patient in the office today.  My personal interpretation of the performed imaging: moderate OA of the knee and hip    XR HIP RIGHT 2-3 VIEWS 9/3/2024 12:08 PM      HISTORY: right hip pain x years, no injury; Chronic right hip pain;  Chronic right hip pain     COMPARISON: None.                                                                       IMPRESSION:  Moderate right hip osteoarthritis with joint space narrowing and  formation of marginal osteophytes. Normal alignment. Degenerative  changes of the lower lumbar spine.      Mingo Chairez MD                  Again, thank you for allowing me to participate in the care of your patient.        Sincerely,        Maurice Villarreal MD

## 2024-09-24 NOTE — PATIENT INSTRUCTIONS
Knee pain:  -MRI to evaluate meniscus  -May take Tylenol 1000mg 3 times per day as needed  -Ask PCP about diclofenac cream    Hip pain:  -Physical therapy referral  -May take Tylenol 1000mg 3 times per day as needed  -Ask PCP about diclofenac cream  -Can consider ultrasound-guided steroid injection into right hip, if not improving with physical therapy       Follow-up: 3-7 days after right knee MRI      Please call [phone] and ask for my team if you have any questions or concerns.

## 2024-10-09 ENCOUNTER — HOSPITAL ENCOUNTER (OUTPATIENT)
Dept: MRI IMAGING | Facility: CLINIC | Age: 64
Discharge: HOME | End: 2024-10-09
Attending: STUDENT IN AN ORGANIZED HEALTH CARE EDUCATION/TRAINING PROGRAM | Admitting: STUDENT IN AN ORGANIZED HEALTH CARE EDUCATION/TRAINING PROGRAM
Payer: COMMERCIAL

## 2024-10-09 ENCOUNTER — THERAPY VISIT (OUTPATIENT)
Dept: PHYSICAL THERAPY | Facility: CLINIC | Age: 64
End: 2024-10-09
Attending: NURSE PRACTITIONER
Payer: COMMERCIAL

## 2024-10-09 DIAGNOSIS — M54.89 OTHER CHRONIC BACK PAIN: ICD-10-CM

## 2024-10-09 DIAGNOSIS — M54.6 CHRONIC RIGHT-SIDED THORACIC BACK PAIN: ICD-10-CM

## 2024-10-09 DIAGNOSIS — M25.561 CHRONIC PAIN OF RIGHT KNEE: ICD-10-CM

## 2024-10-09 DIAGNOSIS — G89.29 CHRONIC RIGHT-SIDED THORACIC BACK PAIN: ICD-10-CM

## 2024-10-09 DIAGNOSIS — G89.29 CHRONIC PAIN OF RIGHT KNEE: ICD-10-CM

## 2024-10-09 DIAGNOSIS — G89.29 OTHER CHRONIC BACK PAIN: ICD-10-CM

## 2024-10-09 PROCEDURE — 97161 PT EVAL LOW COMPLEX 20 MIN: CPT | Mod: GP | Performed by: PHYSICAL THERAPIST

## 2024-10-09 PROCEDURE — 73721 MRI JNT OF LWR EXTRE W/O DYE: CPT | Mod: 26 | Performed by: RADIOLOGY

## 2024-10-09 PROCEDURE — 73721 MRI JNT OF LWR EXTRE W/O DYE: CPT | Mod: RT

## 2024-10-09 PROCEDURE — 97110 THERAPEUTIC EXERCISES: CPT | Mod: GP | Performed by: PHYSICAL THERAPIST

## 2024-10-09 NOTE — PROGRESS NOTES
"PHYSICAL THERAPY EVALUATION  Type of Visit: Evaluation        Fall Risk Screen:  Fall screen completed by: PT  Have you fallen 2 or more times in the past year?: No  Have you fallen and had an injury in the past year?: No  Is patient a fall risk?: No    Subjective       Presenting condition or subjective complaint: spine  Date of onset: 09/06/24 (referred to PT)    Relevant medical history: Arthritis; Fibromyalgia; Hearing problems; Kidney disease; Migraines or headaches   Dates & types of surgery: 4 foot surgeries, 2001    \"I just hurt.\"  Worse sit to stand: \"I can't do that the way I used to.\"  \"I've overdone it.\"  Pt indicates she would like to return to [employer] exercise program.  \"When I was in college, I could do 96 situps ld minute.  I want to do that again.\"  \"I want to have energy back.\"  \"Losing weight.\"  Better in the evenings.  Pain is intermittent.  Pain is R side of neck, \"I know my back is bad.\"  Worse turning her head.  Worst pain is central thoracic \"where the bra strap snaps.\"  Also sore around B SI joints.  \"I'm gonna say jeremiah sharp.\"  Has been doing some chiropractic, which lasts for about a day of relief.  Overall not changing.    Prior diagnostic imaging/testing results: X-ray     Prior therapy history for the same diagnosis, illness or injury:        Prior Level of Function  Transfers: Independent  Ambulation: Independent  ADL: Independent    Living Environment  Social support: With a significant other or spouse   Type of home: House   Stairs to enter the home: Yes 2 Is there a railing: No     Ramp:     Stairs inside the home: Yes 12 Is there a railing: Yes     Help at home:    Equipment owned:       Employment: Not Applicable    Hobbies/Interests: photography, dog training    Patient goals for therapy: enjoy life, exercise, run, have more energy       Objective   CERVICAL SPINE EVALUATION  PAIN: see above  INTEGUMENTARY (edema, incisions): WNL  POSTURE: forward head, rounded shoulders, " "hypolordosis  GAIT: pt ambulates without device without gross asymmetry  ROM: cervical AROM all directions with \"pulling\" on L SB but only restriction on extension.  Restricted thoracic extension and positive for symptoms B rotation.  Negative lumbar AROM all directions.  Negative hip PROM all directions.  MYOTOMES: no discomfort resisted UE MMT or LE MMT, diffusely 4/5.  PALPATION: pt reported palpation cervicothoracic \"felt good\" without precision      Assessment & Plan   CLINICAL IMPRESSIONS  Medical Diagnosis: Other chronic back pain  Chronic right-sided thoracic back pain    Treatment Diagnosis: Back, hip pain   Impression/Assessment: Patient is a 64 year old female with back complaints.  The following significant findings have been identified: Pain, Decreased ROM/flexibility, Decreased strength, Decreased activity tolerance, and Impaired posture. These impairments interfere with their ability to perform self care tasks as compared to previous level of function.     Clinical Decision Making (Complexity):  Clinical Presentation: Stable/Uncomplicated  Clinical Presentation Rationale: based on medical and personal factors listed in PT evaluation  Clinical Decision Making (Complexity): Low complexity    PLAN OF CARE  Treatment Interventions:  Interventions: Manual Therapy, Neuromuscular Re-education, Therapeutic Activity, Therapeutic Exercise    Long Term Goals     PT Goal 1  Goal Description: Minutes pt will be able to walk: 45  Rationale: to maximize safety and independence within the community  Goal Progress: Minutes pt walking: <10  Date Met: 11/20/24      Frequency of Treatment: once per week  Duration of Treatment: six weeks    Education Assessment:   Learner/Method: Patient  Education Comments: Performance in clinic    Risks and benefits of evaluation/treatment have been explained.   Patient/Family/caregiver agrees with Plan of Care.     Evaluation Time:     PT Eval, Low Complexity Minutes (00415): " 20    Signing Clinician: Gerald Marquez, PT        [facility]                                                                                   OUTPATIENT PHYSICAL THERAPY      PLAN OF TREATMENT FOR OUTPATIENT REHABILITATION   Patient's Last Name, First Name, China Quick    YOB: 1960   Provider's Name   [facility]   Medical Record No.  [id number]     Onset Date: 09/06/24 (referred to PT)  Start of Care Date: 10/09/24     Medical Diagnosis:  Other chronic back pain  Chronic right-sided thoracic back pain      PT Treatment Diagnosis:  Back, hip pain Plan of Treatment  Frequency/Duration: once per week/ six weeks    Certification date from 10/09/24 to 11/20/24         See note for plan of treatment details and functional goals     Gerald Marquez, PT                         I CERTIFY THE NEED FOR THESE SERVICES FURNISHED UNDER        THIS PLAN OF TREATMENT AND WHILE UNDER MY CARE     (Physician attestation of this document indicates review and certification of the therapy plan).              Referring Provider:  Annette Wiley    Initial Assessment  See Epic Evaluation- Start of Care Date: 10/09/24

## 2024-10-27 ENCOUNTER — HEALTH MAINTENANCE LETTER (OUTPATIENT)
Age: 64
End: 2024-10-27

## 2024-10-28 ENCOUNTER — TELEPHONE (OUTPATIENT)
Dept: FAMILY MEDICINE | Facility: CLINIC | Age: 64
End: 2024-10-28
Payer: COMMERCIAL

## 2024-10-28 DIAGNOSIS — M12.9 ARTHROPATHY: ICD-10-CM

## 2024-10-28 DIAGNOSIS — G89.4 CHRONIC PAIN SYNDROME: ICD-10-CM

## 2024-10-28 NOTE — TELEPHONE ENCOUNTER
Order/Referral Request    Who is requesting: Pt and pt's     Orders being requested: Hydro-massage bathtub    Reason service is needed/diagnosis: Arthritis, chronic pain, etc    When are orders needed by: asap    Has this been discussed with Provider: Yes    Does patient have a preference on a Group/Provider/Facility? N/A    Does patient have an appointment scheduled?: No    Where to send orders:  Pt not sure where to send orders, will figure that out and call back. Was at work at time of call    Could we send this information to you in [facility] [place] or would you prefer to receive a phone call?:   Patient would prefer a phone call   Okay to leave a detailed message?: Yes at Cell number on file:    Telephone Information:   Mobile [phone]

## 2024-10-28 NOTE — TELEPHONE ENCOUNTER
Spoke with pt, she is going to contact her insurance company and call back next week.    Per provider if pt has a flex spending acct she could write a letter stating it would be medically beneficial.  Germania Barajas RN on 10/28/2024 at 1:55 PM

## 2024-10-29 RX ORDER — HYDROCODONE BITARTRATE AND ACETAMINOPHEN 5; 325 MG/1; MG/1
1 TABLET ORAL EVERY 6 HOURS PRN
Qty: 10 TABLET | Refills: 0 | Status: CANCELLED | OUTPATIENT
Start: 2024-12-04 | End: 2025-01-03

## 2024-10-29 RX ORDER — HYDROCODONE BITARTRATE AND ACETAMINOPHEN 5; 325 MG/1; MG/1
1 TABLET ORAL 2 TIMES DAILY PRN
Qty: 60 TABLET | Refills: 0 | Status: SHIPPED | OUTPATIENT
Start: 2024-11-02 | End: 2024-11-18

## 2024-10-29 RX ORDER — HYDROCODONE BITARTRATE AND ACETAMINOPHEN 5; 325 MG/1; MG/1
1 TABLET ORAL EVERY 6 HOURS PRN
Qty: 10 TABLET | Refills: 0 | Status: CANCELLED | OUTPATIENT
Start: 2024-11-03 | End: 2024-12-03

## 2024-10-30 NOTE — TELEPHONE ENCOUNTER
Emi, RN case manager with [facility], calls to inquire about this request. Pt is also on the call with Emi to discuss further with writer. Pt is trying to get a hydro massage tub for chronic pain r/t arthritis and fibromyalgia, and says that she's talked to PCP about this in the past. Emi said that they checked with insurance customer service, and the first step is to have PCP place a DME order. Emi also checked to see which DME provider is in network, and pt would like to have it sent to [facility]. Order pended, routing to PCP for review.    Estrella Ramirez RN  [facility]

## 2024-10-31 NOTE — TELEPHONE ENCOUNTER
Notify patient that we'll discuss this further and order will be done at her upcoming appointment in November so appropriate documentation can be done.    Heather Mckenzie M.D.

## 2024-11-01 DIAGNOSIS — F41.1 GENERALIZED ANXIETY DISORDER: ICD-10-CM

## 2024-11-01 NOTE — TELEPHONE ENCOUNTER
Pt called back. She will see if she can do it on [employer] or wait for someone to call her again.    Rosalie Walsh on 11/1/2024 at 2:02 PM

## 2024-11-01 NOTE — TELEPHONE ENCOUNTER
Patient Contact    Attempt # 1    Was call answered?  No.  Left message on voicemail with information to call back.      RON and PHQ needed for refill below.     Catie العلي RN on 11/1/2024 at 8:14 AM

## 2024-11-01 NOTE — TELEPHONE ENCOUNTER
Routing refill request to provider for review/approval because:  RON and PHQ sent via my chart    PHQ-9 score:        8/5/2024    11:34 AM   PHQ   PHQ-9 Total Score 8   Q9: Thoughts of better off dead/self-harm past 2 weeks Not at all             9/13/2023     1:15 PM 11/10/2023    11:39 AM 8/5/2024    11:34 AM   RON-7 SCORE   Total Score 3 (minimal anxiety)     Total Score 3 1 6       Pending Prescriptions:                       Disp   Refills    FLUoxetine (PROZAC) 20 MG capsule [Pharma*90 cap*3            Sig: Take 1 capsule (20 mg) by mouth daily      Requested Prescriptions   Pending Prescriptions Disp Refills    FLUoxetine (PROZAC) 20 MG capsule [Pharmacy Med Name: fluoxetine 20 mg capsule] 90 capsule 3     Sig: Take 1 capsule (20 mg) by mouth daily       SSRIs Protocol Failed - 11/1/2024  8:05 AM        Failed - RON-7 score of less than 5 in past 6 months.     Please review last RON-7 score.           Passed - Medication is active on med list        Passed - Recent (12 mo) or future (90 days) visit within the authorizing provider's specialty     The patient must have completed an in-person or virtual visit within the past 12 months or has a future visit scheduled within the next 90 days with the authorizing provider s specialty.  Urgent care and e-visits do not quality as an office visit for this protocol.          Passed - Medication indicated for associated diagnosis     Medication is associated with one or more of the following diagnoses:              Anxiety             Bipolar  Depression  Obsessive-compulsive disorder             Panic disorder  Postmenopausal flushing             Premenstrual dysphoric disorder             Social phobia   Adjustment disorder with depressed mood   Mood disorder   Adjustment disorder with anxious mood          Passed - Patient is age 18 or older        Passed - No active pregnancy on record        Passed - No positive pregnancy test in last 12 months           Catie SCHROEDER  MONALISA العلي on 11/1/2024 at 8:05 AM

## 2024-11-18 ENCOUNTER — OFFICE VISIT (OUTPATIENT)
Dept: FAMILY MEDICINE | Facility: CLINIC | Age: 64
End: 2024-11-18
Attending: FAMILY MEDICINE
Payer: COMMERCIAL

## 2024-11-18 VITALS
SYSTOLIC BLOOD PRESSURE: 98 MMHG | BODY MASS INDEX: 23.84 KG/M2 | TEMPERATURE: 98.7 F | DIASTOLIC BLOOD PRESSURE: 62 MMHG | HEIGHT: 55 IN | HEART RATE: 60 BPM | WEIGHT: 103 LBS

## 2024-11-18 DIAGNOSIS — M25.512 ACUTE PAIN OF LEFT SHOULDER: ICD-10-CM

## 2024-11-18 DIAGNOSIS — M12.9 ARTHROPATHY: ICD-10-CM

## 2024-11-18 DIAGNOSIS — G89.4 CHRONIC PAIN SYNDROME: Primary | ICD-10-CM

## 2024-11-18 PROBLEM — F70 MILD INTELLECTUAL DISABILITIES: Status: ACTIVE | Noted: 2024-11-18

## 2024-11-18 PROBLEM — Z86.59: Status: ACTIVE | Noted: 2024-11-18

## 2024-11-18 PROCEDURE — 99214 OFFICE O/P EST MOD 30 MIN: CPT | Performed by: FAMILY MEDICINE

## 2024-11-18 RX ORDER — HYDROCODONE BITARTRATE AND ACETAMINOPHEN 5; 325 MG/1; MG/1
1 TABLET ORAL 2 TIMES DAILY PRN
Qty: 60 TABLET | Refills: 0 | Status: SHIPPED | OUTPATIENT
Start: 2024-12-02 | End: 2025-01-01

## 2024-11-18 RX ORDER — HYDROCODONE BITARTRATE AND ACETAMINOPHEN 5; 325 MG/1; MG/1
1 TABLET ORAL 2 TIMES DAILY PRN
Qty: 60 TABLET | Refills: 0 | Status: SHIPPED | OUTPATIENT
Start: 2025-01-30 | End: 2025-03-01

## 2024-11-18 RX ORDER — HYDROCODONE BITARTRATE AND ACETAMINOPHEN 5; 325 MG/1; MG/1
1 TABLET ORAL 2 TIMES DAILY PRN
Qty: 60 TABLET | Refills: 0 | Status: SHIPPED | OUTPATIENT
Start: 2024-12-31 | End: 2025-01-30

## 2024-11-18 ASSESSMENT — PAIN SCALES - GENERAL: PAINLEVEL_OUTOF10: MODERATE PAIN (5)

## 2024-11-18 NOTE — PROGRESS NOTES
Assessment & Plan     Chronic pain syndrome   Arthropathy      CSA is up to date  PDMP reviewed  UDS up to date    Patient also requesting a hydro jet or hydro massage tub to help her with her pain.  Discussed this with her [facility]  and they said the first step was a DME order, that was placed today.     Patient has previously used a hot tub at her brothers house and felt it was beneficial for her pain/joint pain and would like one for home.    Depending on documentation requirements, may require another visit.   - HYDROcodone-acetaminophen (NORCO) 5-325 MG tablet; Take 1 tablet by mouth 2 times daily as needed for severe pain. ** VISIT DUE/LAST FILL **  - HYDROcodone-acetaminophen (NORCO) 5-325 MG tablet; Take 1 tablet by mouth 2 times daily as needed for severe pain.  - HYDROcodone-acetaminophen (NORCO) 5-325 MG tablet; Take 1 tablet by mouth 2 times daily as needed for severe pain.  - Miscellaneous DME Order                         Devon Atkinson is a 64 year old, presenting for the following health issues:  Back Pain        11/18/2024     2:45 PM   Additional Questions   Roomed by Sugar CHOW CMA   Accompanied by Self     HPI     Migranes well controlled UNLESS she forgets to take her propranolol or topiramate. That happens maybe once a month or so.  So using the imitrex about once a month or so.         Pain History:  When did you first notice your pain? Chronic   Have you seen this provider for your pain in the past? Yes   Where in your body do you have pain? Middle back  Are you seeing anyone else for your pain? No    UDS up to date  CSA up to date for another 3 months.             1/6/2023    11:30 AM 11/10/2023    11:39 AM 8/5/2024    11:34 AM   PHQ-9 SCORE   PHQ-9 Total Score 5 1 8           9/13/2023     1:15 PM 11/10/2023    11:39 AM 8/5/2024    11:34 AM   RON-7 SCORE   Total Score 3 (minimal anxiety)     Total Score 3 1 6           1/6/2023    11:30 AM 11/10/2023    11:39 AM 8/5/2024     "11:34 AM   PHQ-9 SCORE   PHQ-9 Total Score 5 1 8           9/13/2023     1:15 PM 11/10/2023    11:39 AM 8/5/2024    11:34 AM   RON-7 SCORE   Total Score 3 (minimal anxiety)     Total Score 3 1 6           11/10/2023    11:39 AM 8/5/2024    11:34 AM 11/18/2024     2:59 PM   PEG Score   PEG Total Score 8.33 8 7.67       Chronic Pain Follow Up:    Location of pain: Middle back, bilateral hip (worse on right side) and neck  Analgesia/pain control:    - Recent changes:  She notes cramping pain in right thigh when driving, a sharp pain when yawning behind right ear and  pain on inside of right knee   - Overall control: Tolerable with discomfort    - Current treatments: Muscle relaxer and opioids   Adherence:     - Do you ever take more pain medicine than prescribed? No    - When did you take your last dose of pain medicine?  yesterday   Adverse effects: No       Left shoulder has been hurting \"bursitis\".   Hurts when she sleeps with her left arm over her head and then when she lowers her arm it hurts a lot.   Unable to take NSAIDS due to renal function  Only taking acetaminophen infrequently because it only hurts when she's lowering her arm from overhead.       PDMP Review         Value Time User    State PDMP site checked  Yes 11/18/2024  3:04 PM Heather Mckenzie MD          Last CSA Agreement:   CSA -- Patient Level:     [Media Unavailable] Controlled Substance Agreement - Opioid - Scan on 2/23/2024 10:10 AM   [Media Unavailable] Controlled Substance Agreement - Opioid - Scan on 1/6/2023 12:13 PM   [Media Unavailable] Controlled Substance Agreement - Opioid - Scan on 2/14/2022 10:07 AM   [Media Unavailable] Controlled Substance Agreement - Opioid - Scan on 2/11/2022  9:26 AM   [Media Unavailable] Controlled Substance Agreement - Opioid - Scan on 5/27/2021  2:04 PM: opioid consent       Last UDS: 8/9/2024            Review of Systems  Constitutional, HEENT, cardiovascular, pulmonary, gi and gu systems are negative, " "except as otherwise noted.      Objective    BP 98/62   Pulse 60   Temp 98.7  F (37.1  C) (Tympanic)   Ht 1.397 m (4' 7\")   Wt 46.7 kg (103 lb)   BMI 23.94 kg/m    Body mass index is 23.94 kg/m .  Physical Exam   GENERAL: alert and no distress  MS: LUE exam shows normal strength and muscle mass, no deformities, no erythema, induration, or nodules, ROM of all joints is normal, no evidence of joint instability, and strength 5/5 in upper extremities bilaterally  PSYCH: concentration poor and appearance well groomed            Signed Electronically by: Heather Mckenzie MD    DME (Durable Medical Equipment) Orders and Documentation  Orders Placed This Encounter   Procedures    Miscellaneous DME Order        The patient was assessed and it was determined the patient is in need of the following listed DME Supplies/Equipment. Please complete supporting documentation below to demonstrate medical necessity.         "

## 2024-12-04 ENCOUNTER — THERAPY VISIT (OUTPATIENT)
Dept: PHYSICAL THERAPY | Facility: CLINIC | Age: 64
End: 2024-12-04
Payer: COMMERCIAL

## 2024-12-04 DIAGNOSIS — G89.29 CHRONIC RIGHT-SIDED THORACIC BACK PAIN: ICD-10-CM

## 2024-12-04 DIAGNOSIS — M54.6 CHRONIC RIGHT-SIDED THORACIC BACK PAIN: ICD-10-CM

## 2024-12-04 DIAGNOSIS — M54.89 OTHER CHRONIC BACK PAIN: Primary | ICD-10-CM

## 2024-12-04 DIAGNOSIS — G89.29 OTHER CHRONIC BACK PAIN: Primary | ICD-10-CM

## 2024-12-04 NOTE — PROGRESS NOTES
12/04/24 0500   Appointment Info   Signing clinician's name / credentials Gerald Marquez, PT   Total/Authorized Visits 6   Visits Used 2  (pt late; arrived well after no show call but in conversation with pt at time of that call, she was nearly to clinic so we elected to continue with abbreviated appt)   Medical Diagnosis Other chronic back pain  Chronic right-sided thoracic back pain   PT Tx Diagnosis Back, hip pain   Progress Note/Certification   Start of Care Date 10/09/24   Onset of illness/injury or Date of Surgery 09/06/24  (referred to PT)   Therapy Frequency every other week   Predicted Duration four visits   Certification date from 11/21/24   Certification date to 01/29/25   Progress Note Completed Date 10/09/24   PT Goal 1   Goal Description Minutes pt will be able to walk: 45   Rationale to maximize safety and independence within the community   Goal Progress Minutes pt walking: <10   Date Met 01/29/25   Subjective Report   Subjective Report Pt reports HEP feels good.  Overall has been consistent with that but has been quite busy with work.   Objective Measures   Objective Measures Objective Measure 1   Objective Measure 1   Details No pain behaviors throughout.   Therapeutic Procedure/Exercise   Therapeutic Procedures: strength, endurance, ROM, flexibility minutes (38116) 15   PTRx Ther Proc 1 Shoulder Theraband Rows   PTRx Ther Proc 1 - Details 12/04 Pt notes this feels good to do.  OK to continue.   Skilled Intervention See above   Patient Response/Progress See above   PTRx Ther Proc 2 Shoulder Theraband Extension   PTRx Ther Proc 2 - Details 12/04 Added today.  Used blue band in clinic and pt noted was quite challenging.  Can progress by toe touch on one leg progressing to SLS.   PTRx Ther Proc 3 Fire Hydrant   PTRx Ther Proc 3 - Details 12/04 Did this standing instead of quadruped.  Pt reported this felt quite good to do.   Plan   Plan for next session Pleased pt has liked exercise approach so far.   Consistency in attendance has been challenge with late arrivals to both completed appts, a no show, and a short notice cancellation.  Reminded pt of 10:40 scheduled time and pt indicated she should be able to keep that.   Total Session Time   Timed Code Treatment Minutes 15   Total Treatment Time (sum of timed and untimed services) 15         [facility]                                                                                   OUTPATIENT PHYSICAL THERAPY    PLAN OF TREATMENT FOR OUTPATIENT REHABILITATION   Patient's Last Name, First Name, China Quick    YOB: 1960   Provider's Name   [facility]   Medical Record No.  [id number]     Onset Date: 09/06/24 (referred to PT)  Start of Care Date: 10/09/24     Medical Diagnosis:  Other chronic back pain  Chronic right-sided thoracic back pain      PT Treatment Diagnosis:  Back, hip pain Plan of Treatment  Frequency/Duration: every other week/ four visits    Certification date from 11/21/24 to 01/29/25         See note for plan of treatment details and functional goals     Gerald Marquez, PT                         I CERTIFY THE NEED FOR THESE SERVICES FURNISHED UNDER        THIS PLAN OF TREATMENT AND WHILE UNDER MY CARE     (Physician attestation of this document indicates review and certification of the therapy plan).              Referring Provider:  Annette Wiley    Initial Assessment  See Epic Evaluation- Start of Care Date: 10/09/24

## 2024-12-30 ENCOUNTER — OFFICE VISIT (OUTPATIENT)
Dept: URGENT CARE | Facility: URGENT CARE | Age: 64
End: 2024-12-30
Payer: COMMERCIAL

## 2024-12-30 ENCOUNTER — ANCILLARY PROCEDURE (OUTPATIENT)
Dept: GENERAL RADIOLOGY | Facility: CLINIC | Age: 64
End: 2024-12-30
Attending: FAMILY MEDICINE
Payer: COMMERCIAL

## 2024-12-30 VITALS
OXYGEN SATURATION: 99 % | DIASTOLIC BLOOD PRESSURE: 83 MMHG | TEMPERATURE: 97.1 F | SYSTOLIC BLOOD PRESSURE: 143 MMHG | WEIGHT: 98 LBS | HEART RATE: 50 BPM | RESPIRATION RATE: 20 BRPM | BODY MASS INDEX: 22.78 KG/M2

## 2024-12-30 DIAGNOSIS — B00.1 COLD SORE: ICD-10-CM

## 2024-12-30 DIAGNOSIS — J11.1 INFLUENZA-LIKE ILLNESS: Primary | ICD-10-CM

## 2024-12-30 DIAGNOSIS — R05.1 ACUTE COUGH: ICD-10-CM

## 2024-12-30 DIAGNOSIS — R07.0 THROAT PAIN: ICD-10-CM

## 2024-12-30 LAB
DEPRECATED S PYO AG THROAT QL EIA: NEGATIVE
FLUAV AG SPEC QL IA: NEGATIVE
FLUBV AG SPEC QL IA: NEGATIVE
S PYO DNA THROAT QL NAA+PROBE: NOT DETECTED

## 2024-12-30 PROCEDURE — 71046 X-RAY EXAM CHEST 2 VIEWS: CPT | Mod: TC | Performed by: RADIOLOGY

## 2024-12-30 PROCEDURE — 87651 STREP A DNA AMP PROBE: CPT | Performed by: FAMILY MEDICINE

## 2024-12-30 PROCEDURE — 87635 SARS-COV-2 COVID-19 AMP PRB: CPT | Performed by: FAMILY MEDICINE

## 2024-12-30 PROCEDURE — 99214 OFFICE O/P EST MOD 30 MIN: CPT | Performed by: FAMILY MEDICINE

## 2024-12-30 PROCEDURE — 87804 INFLUENZA ASSAY W/OPTIC: CPT | Performed by: FAMILY MEDICINE

## 2024-12-30 RX ORDER — VALACYCLOVIR HYDROCHLORIDE 1 G/1
2000 TABLET, FILM COATED ORAL 2 TIMES DAILY
Qty: 4 TABLET | Refills: 0 | Status: SHIPPED | OUTPATIENT
Start: 2024-12-30 | End: 2024-12-31

## 2024-12-30 NOTE — PROGRESS NOTES
Assessment & Plan       ICD-10-CM    1. Influenza-like illness  J11.1       2. Throat pain  R07.0 Streptococcus A Rapid Screen w/Reflex to PCR - Clinic Collect     Group A Streptococcus PCR Throat Swab      3. Acute cough  R05.1 Influenza A & B Antigen - Clinic Collect     COVID-19 Virus (Coronavirus) by PCR Nose     XR Chest 2 Views      4. Cold sore  B00.1 valACYclovir (VALTREX) 1000 mg tablet         I advised her that her symptoms are likely viral in nature. I don't find evidence of pneumonia on exam or on xray, and her influenza test is negative. She has been sick now for 9 days and the likelihood that treatment would benefit her now if this is viral is very low.     COVID is still pending, as is her strep PCR. Will notify with positives and treat accordingly.     I did advise her that she has a Rx on file for her cold sores, she doesn't recall if she has used it since March so I agreed to order another refill just in case. This is only 2 days old so medication may still be of benefit.     We discussed supportive cares at length, I encourage her to increase fluids, rest, use ibuprofen/tylenol prn and to start her Valtrex asap. Follow up if not improving in the next 5-7 days or sooner with any worsening.     Natacha Ward MD  [facility] URGENT [facility]    Devon Atkinson is a 64 year old female who presents to clinic today for the following health issues:  Chief Complaint   Patient presents with    URI     X 1.5 weeks, cough, headache, back pain/ribcage pain from coughing, diarrhea, throat pain, fever, body aches    Mouth Lesions     X 2 days     HPI    She recently  traveled to [place], and got sick while there.  was sick with similar illness but has recovered and she has not.   She left home on December 19 and was visiting family. 2 days into the trip they both got sick, one of the kids was sick prior to their arrival. She returned home on December 28. No other rash.  The lesions on her mouth started when they got home.     7 pt ROS is otherwise negative except as noted in HPI.      Objective    BP (!) 143/83   Pulse 50   Temp 97.1  F (36.2  C) (Tympanic)   Resp 20   Wt 44.5 kg (98 lb)   SpO2 99%   BMI 22.78 kg/m    Physical Exam   Vitals noted.  Patient alert, oriented, and in no acute distress. Just looks mildly ill.   Ears:  Canals clear, TM's nl bilaterally.  No erythema or fluid.   Eyes:  bright without discharge or injection. PER and EOMI.    Nares patent without inflammation or drainage..   Oral:  Oropharynx nl without erythema, exudate, mass or other lesions.   She does have a scabbed lesion just beneath the lower lip consistent with a herpes simplex lesion. I could not appreciate any similar lesions in the mouth.   Neck:  Supple without lymphadenopathy, JVD or masses.   CV:  RRR without murmur.   Respiratory:  Lungs clear to auscultation bilaterally except very slight left basilar rales that improve on repeated inspiration.   Skin without other rash.   Abdomen soft and nontender to palpation.     I did a CXR, this was independently reviewed by me and shows no acute infiltrate, mass, effusion or other acute pathology.     Results for orders placed or performed in visit on 12/30/24   XR Chest 2 Views     Status: None    Narrative    XR CHEST 2 VIEWS 12/30/2024 2:42 PM    HISTORY: persistent cough and fever, rule out pneumonia, left lower  rales; Acute cough    COMPARISON: None.      Impression    IMPRESSION: No focal airspace opacities, pleural effusion or  pneumothorax. The cardiac and mediastinal silhouettes are normal.    KATHRYN HANSEN MD         SYSTEM ID:  [id number]   Results for orders placed or performed in visit on 12/30/24   Streptococcus A Rapid Screen w/Reflex to PCR - Clinic Collect     Status: Normal    Specimen: Throat; Swab   Result Value Ref Range    Group A Strep antigen Negative Negative   Influenza A & B Antigen - Clinic Collect     Status: Normal     Specimen: Nose; Swab   Result Value Ref Range    Influenza A antigen Negative Negative    Influenza B antigen Negative Negative    Narrative    Test results must be correlated with clinical data. If necessary, results should be confirmed by a molecular assay or viral culture.

## 2024-12-31 LAB — SARS-COV-2 RNA RESP QL NAA+PROBE: NEGATIVE

## 2025-02-18 ENCOUNTER — PATIENT OUTREACH (OUTPATIENT)
Dept: CARE COORDINATION | Facility: CLINIC | Age: 65
End: 2025-02-18
Payer: COMMERCIAL

## 2025-03-12 ENCOUNTER — PATIENT OUTREACH (OUTPATIENT)
Dept: CARE COORDINATION | Facility: CLINIC | Age: 65
End: 2025-03-12
Payer: COMMERCIAL

## 2025-04-03 ENCOUNTER — VIRTUAL VISIT (OUTPATIENT)
Dept: FAMILY MEDICINE | Facility: CLINIC | Age: 65
End: 2025-04-03
Payer: COMMERCIAL

## 2025-04-03 DIAGNOSIS — G89.4 CHRONIC PAIN SYNDROME: ICD-10-CM

## 2025-04-03 DIAGNOSIS — M12.9 ARTHROPATHY: ICD-10-CM

## 2025-04-03 DIAGNOSIS — M17.11 ARTHRITIS OF RIGHT KNEE: ICD-10-CM

## 2025-04-03 DIAGNOSIS — N18.31 STAGE 3A CHRONIC KIDNEY DISEASE (H): ICD-10-CM

## 2025-04-03 DIAGNOSIS — M16.11 PRIMARY OSTEOARTHRITIS OF RIGHT HIP: Primary | ICD-10-CM

## 2025-04-03 RX ORDER — HYDROCODONE BITARTRATE AND ACETAMINOPHEN 5; 325 MG/1; MG/1
1 TABLET ORAL 2 TIMES DAILY PRN
Qty: 60 TABLET | Refills: 0 | Status: SHIPPED | OUTPATIENT
Start: 2025-06-02 | End: 2025-07-02

## 2025-04-03 RX ORDER — HYDROCODONE BITARTRATE AND ACETAMINOPHEN 5; 325 MG/1; MG/1
1 TABLET ORAL 2 TIMES DAILY PRN
Qty: 60 TABLET | Refills: 0 | Status: SHIPPED | OUTPATIENT
Start: 2025-04-03 | End: 2025-05-03

## 2025-04-03 RX ORDER — HYDROCODONE BITARTRATE AND ACETAMINOPHEN 5; 325 MG/1; MG/1
1 TABLET ORAL 2 TIMES DAILY PRN
Qty: 60 TABLET | Refills: 0 | Status: SHIPPED | OUTPATIENT
Start: 2025-05-03 | End: 2025-06-02

## 2025-04-03 ASSESSMENT — ANXIETY QUESTIONNAIRES
GAD7 TOTAL SCORE: 3
2. NOT BEING ABLE TO STOP OR CONTROL WORRYING: NOT AT ALL
6. BECOMING EASILY ANNOYED OR IRRITABLE: NOT AT ALL
1. FEELING NERVOUS, ANXIOUS, OR ON EDGE: NEARLY EVERY DAY
7. FEELING AFRAID AS IF SOMETHING AWFUL MIGHT HAPPEN: NOT AT ALL
GAD7 TOTAL SCORE: 3
3. WORRYING TOO MUCH ABOUT DIFFERENT THINGS: NOT AT ALL
5. BEING SO RESTLESS THAT IT IS HARD TO SIT STILL: NOT AT ALL

## 2025-04-03 ASSESSMENT — PATIENT HEALTH QUESTIONNAIRE - PHQ9
5. POOR APPETITE OR OVEREATING: NOT AT ALL
SUM OF ALL RESPONSES TO PHQ QUESTIONS 1-9: 4

## 2025-04-03 NOTE — LETTER

## 2025-04-03 NOTE — PATIENT INSTRUCTIONS
"See me in 3 months for follow up   Next visit needs to be IN PERSON (not virtual) for refills    Come to the [facility]  to sign the controlled substance agreement    When you are out of refills or the refills say \"zero\", it is time to schedule your next appointment in clinic!    Labs are released to you almost immediately and sometimes before I have had a chance to review them.  I review labs regularly and once they are all in, you will either be sent a letter with your results or if you are signed up for on-line services, you will be notified that results are available to you on [email or web address]. If there are serious findings, you typically will be called.    If you have any questions about your visit, your symptoms, your medication, your test results or it is not clear what your diagnosis or treatment plan is please contact me (via Spor) or call the care team at [phone] and say \"Care Team\"          "

## 2025-04-03 NOTE — PROGRESS NOTES
China is a 64 year old who is being evaluated via a billable video visit.    How would you like to obtain your AVS? MyChart  If the video visit is dropped, the invitation should be resent by: Text to cell phone: [phone]  Will anyone else be joining your video visit? No      Assessment & Plan       Chronic pain syndrome   PDMP reviewed  CSA printed and signed- left at  for patient to sign  Follow up in 3 months (in person)    - HYDROcodone-acetaminophen (NORCO) 5-325 MG tablet; Take 1 tablet by mouth 2 times daily as needed for severe pain. ** VISIT DUE/LAST FILL **  - HYDROcodone-acetaminophen (NORCO) 5-325 MG tablet; Take 1 tablet by mouth 2 times daily as needed for severe pain.  - HYDROcodone-acetaminophen (NORCO) 5-325 MG tablet; Take 1 tablet by mouth 2 times daily as needed for severe pain.    Arthropathy     - HYDROcodone-acetaminophen (NORCO) 5-325 MG tablet; Take 1 tablet by mouth 2 times daily as needed for severe pain. ** VISIT DUE/LAST FILL **  - HYDROcodone-acetaminophen (NORCO) 5-325 MG tablet; Take 1 tablet by mouth 2 times daily as needed for severe pain.  - HYDROcodone-acetaminophen (NORCO) 5-325 MG tablet; Take 1 tablet by mouth 2 times daily as needed for severe pain.    Stage 3a chronic kidney disease (H)  Continue monitoring    Primary osteoarthritis of right hip  Has seen Dr. Ford in ortho - told to follow up if not improving for possible injection    Arthritis of right knee  MRI has been done  Chrondromalacia/arthritis- told to follow up with Dr. Villarreal for possible steroid injection        PDMP Review         Value Time User    State PDMP site checked  Yes 4/3/2025  4:38 PM Heather Mckenzie MD              Subjective   China is a 64 year old, presenting for the following health issues:  Video Visit (Back pain)        4/3/2025     4:09 PM   Additional Questions   Roomed by Sugar CHOW CMA   Accompanied by Self     HPI      Pain History:  When did you first notice your  "pain? Chronic   Have you seen this provider for your pain in the past? Yes   Where in your body do you have pain? Middle back  Are you seeing anyone else for your pain? No        11/10/2023    11:39 AM 8/5/2024    11:34 AM 4/3/2025     4:19 PM   PHQ-9 SCORE   PHQ-9 Total Score 1 8 4           11/10/2023    11:39 AM 8/5/2024    11:34 AM 4/3/2025     4:19 PM   RON-7 SCORE   Total Score 1 6 3             11/10/2023    11:39 AM 8/5/2024    11:34 AM 4/3/2025     4:19 PM   PHQ-9 SCORE   PHQ-9 Total Score 1 8 4           11/10/2023    11:39 AM 8/5/2024    11:34 AM 4/3/2025     4:19 PM   RON-7 SCORE   Total Score 1 6 3           8/5/2024    11:34 AM 11/18/2024     2:59 PM 4/3/2025     4:15 PM   PEG Score   PEG Total Score 8 7.67 7.67       Chronic Pain Follow Up:    Location of pain: Middle back, bilateral hips, shoulders and neck  Analgesia/pain control:    - Recent changes:  if she is laying on left side it is hard to raise head when laying down. Right knee locks up and leg feels weak and gives out   - Overall control: Tolerable with discomfort    - Current treatments: opioid and muscle relaxer, muscle mist  Adherence:     - Do you ever take more pain medicine than prescribed? Yes: she sometimes takes another 1/2 tablet if pain is bad about 30 minutes after taking firsat dose   - When did you take your last dose of pain medicine?  \"Awhile\" due to being out of medications   Adverse effects: No   PDMP Review         Value Time User    State PDMP site checked  Yes 11/18/2024  3:04 PM Heather Mckenzie MD          Last CSA Agreement:   CSA -- Patient Level:     [Media Unavailable] Controlled Substance Agreement - Opioid - Scan on 2/23/2024 10:10 AM   [Media Unavailable] Controlled Substance Agreement - Opioid - Scan on 1/6/2023 12:13 PM   [Media Unavailable] Controlled Substance Agreement - Opioid - Scan on 2/14/2022 10:07 AM   [Media Unavailable] Controlled Substance Agreement - Opioid - Scan on 2/11/2022  9:26 AM   [Media " Unavailable] Controlled Substance Agreement - Opioid - Scan on 5/27/2021  2:04 PM: opioid consent       Last UDS: 8/9/2024        Review of Systems  Constitutional, HEENT, cardiovascular, pulmonary, gi and gu systems are negative, except as otherwise noted.      Objective           Vitals:  No vitals were obtained today due to virtual visit.    Physical Exam   GENERAL: alert and no distress  EYES: Eyes grossly normal to inspection.  No discharge or erythema, or obvious scleral/conjunctival abnormalities.  RESP: No audible wheeze, cough, or visible cyanosis.    SKIN: Visible skin clear. No significant rash, abnormal pigmentation or lesions.  NEURO: Cranial nerves grossly intact.  Mentation and speech appropriate for age.  PSYCH: Appropriate affect, tone, and pace of words          Video-Visit Details    Type of service:  Video Visit   Originating Location (pt. Location): Home    Distant Location (provider location):  On-site  Platform used for Video Visit: Jeffry  Signed Electronically by: Heather Mckenzie MD

## 2025-04-07 DIAGNOSIS — M12.9 ARTHROPATHY: ICD-10-CM

## 2025-04-07 DIAGNOSIS — G89.4 CHRONIC PAIN SYNDROME: ICD-10-CM

## 2025-04-07 RX ORDER — HYDROCODONE BITARTRATE AND ACETAMINOPHEN 5; 325 MG/1; MG/1
1 TABLET ORAL 2 TIMES DAILY PRN
Qty: 60 TABLET | Refills: 0 | Status: SHIPPED | OUTPATIENT
Start: 2025-04-07

## 2025-04-07 NOTE — TELEPHONE ENCOUNTER
"New prescription sent.  This is not a \"new\" prescription, but may be due to her new insurance.     Heather Mckenzie M.D.    "

## 2025-04-07 NOTE — TELEPHONE ENCOUNTER
Medication Question or Refill        What medication are you calling about (include dose and sig)?: [place]    Preferred Pharmacy:   [facility] - [place] - [address] [address] [address]  Phone: [phone] Fax: [phone]      Controlled Substance Agreement on file:   CSA -- Patient Level:     [Media Unavailable] Controlled Substance Agreement - Opioid - Scan on 4/4/2025  4:05 PM   [Media Unavailable] Controlled Substance Agreement - Opioid - Scan on 2/23/2024 10:10 AM   [Media Unavailable] Controlled Substance Agreement - Opioid - Scan on 1/6/2023 12:13 PM   [Media Unavailable] Controlled Substance Agreement - Opioid - Scan on 2/14/2022 10:07 AM   [Media Unavailable] Controlled Substance Agreement - Opioid - Scan on 2/11/2022  9:26 AM   [Media Unavailable] Controlled Substance Agreement - Opioid - Scan on 5/27/2021  2:04 PM: opioid consent       Who prescribed the medication?: Heather Mckenzie    Do you need a refill? Yes    Patient offered an appointment? No    Do you have any questions or concerns?  Yes: Pt called. States she has new insurance and pharmacy only gave her a 7 day supply. [facility] with [facility]. Her new insurance dictates she can only get a 7 day supply on the first fill for opioids. The remaining script was voided. We will need to send a new script for the remaining balance and it may need a prior auth to see if any coverage. Per pharmacy she was paying out of pocket with old insurance.       Could we send this information to you in [employer] or would you prefer to receive a phone call?:   Patient would prefer a phone call   Okay to leave a detailed message?: Yes at Home number on file [phone] (home)    Rosalie Walsh on 4/7/2025 at 9:15 AM

## 2025-04-09 ENCOUNTER — TELEPHONE (OUTPATIENT)
Dept: FAMILY MEDICINE | Facility: CLINIC | Age: 65
End: 2025-04-09
Payer: COMMERCIAL

## 2025-04-09 NOTE — TELEPHONE ENCOUNTER
Prior Authorization Approval    Medication: HYDROCODONE-ACETAMINOPHEN 5-325 MG PO TABS  Authorization Effective Date: 3/10/2025  Authorization Expiration Date: 4/9/2026  Insurance Company: [employer] Non-Specialty PA's - Phone [phone] Fax [phone]  Which Pharmacy is filling the prescription: [facility] - [facility] [address] [facility]  Pharmacy Notified:YES  Patient Notified: YES (faxed approval letter to pharmacy and notified patient via [employer] message)

## 2025-04-09 NOTE — TELEPHONE ENCOUNTER
Retail Pharmacy Prior Authorization Team   Phone: [phone]    Prior Auth - Medication (Norco)  (Newest Message First)  View All Conversations on this Encounter  Gloria Johnson  [facility] Med10 minutes ago (10:49 AM)     PRICILA ROSA for pt - Routed to P.A. Team     Heather Mckenzie MD  [facility] Pool33 minutes ago (10:26 AM)       Notify patient.  We can try a prior authorization, but she may need to pay out of pocket for this.     Heather Mckenzie M.D.            Note     Gloria Johnson routed conversation to Heather Mckenzie MD1 hour ago (9:53 AM)     Gloria Johnson1 hour ago (9:53 AM)     EA  Per fax received from [facility]   - HYDROcodone-acetaminophen (NORCO) 5-325 MG tablet is not covered by patient's Insurance Company  Dr. Mckenzie - Please choose:  1.  Change medication that is not covered to a different medication and send new prescription to patient's pharmacy?  2.  Patient will need to pay for the non-covered medication out-of-pocket?   3.  Try for Prior Authorization with Insurance Company to get medication covered?   4.  Patient needs clinic appointment to discuss medication options?       Key#   [id number]

## 2025-04-09 NOTE — TELEPHONE ENCOUNTER
Retail Pharmacy Prior Authorization Team   Phone: [phone]    PA Initiation    Medication: HYDROCODONE-ACETAMINOPHEN 5-325 MG PO TABS  Insurance Company: [employer] Non-Specialty PA's - Phone [phone] Fax [phone]  Pharmacy Filling the Rx: [facility] - [facility] [address] [facility]  Filling Pharmacy Phone: [phone]  Filling Pharmacy Fax:    Start Date: 4/9/2025    TRENTON WAYNE (Key: [id number])

## 2025-04-11 PROBLEM — G89.29 CHRONIC BILATERAL LOW BACK PAIN WITHOUT SCIATICA: Status: RESOLVED | Noted: 2023-11-29 | Resolved: 2025-04-11

## 2025-04-11 PROBLEM — M54.50 CHRONIC BILATERAL LOW BACK PAIN WITHOUT SCIATICA: Status: RESOLVED | Noted: 2023-11-29 | Resolved: 2025-04-11

## 2025-05-15 ENCOUNTER — NURSE TRIAGE (OUTPATIENT)
Dept: FAMILY MEDICINE | Facility: CLINIC | Age: 65
End: 2025-05-15
Payer: COMMERCIAL

## 2025-05-15 NOTE — TELEPHONE ENCOUNTER
"Reason for Disposition    Headache is a chronic symptom (recurrent or ongoing AND present > 4 weeks)    Answer Assessment - Initial Assessment Questions  1. LOCATION: \"Where does it hurt?\"       All over, behind ears  2. ONSET: \"When did the headache start?\" (Minutes, hours or days)       2 weeks ago started having every day  3. PATTERN: \"Does the pain come and go, or has it been constant since it started?\"      intermittent  4. SEVERITY: \"How bad is the pain?\" and \"What does it keep you from doing?\"  (e.g., Scale 1-10; mild, moderate, or severe)    - MILD (1-3): doesn't interfere with normal activities     - MODERATE (4-7): interferes with normal activities or awakens from sleep     - SEVERE (8-10): excruciating pain, unable to do any normal activities         None current  5. RECURRENT SYMPTOM: \"Have you ever had headaches before?\" If Yes, ask: \"When was the last time?\" and \"What happened that time?\"       Yes, not daily  6. CAUSE: \"What do you think is causing the headache?\"      lisght  7. MIGRAINE: \"Have you been diagnosed with migraine headaches?\" If Yes, ask: \"Is this headache similar?\"       yes  8. HEAD INJURY: \"Has there been any recent injury to the head?\"       denies  9. OTHER SYMPTOMS: \"Do you have any other symptoms?\" (fever, stiff neck, eye pain, sore throat, cold symptoms)      Bone above eye  10. PREGNANCY: \"Is there any chance you are pregnant?\" \"When was your last menstrual period?\"        na    Protocols used: Headache-A-AH    "

## 2025-05-15 NOTE — TELEPHONE ENCOUNTER
Patient calling reporting daily migraine X2 weeks  Taking propranolol, Topamax, Imitrex as ordered with relief   Denies facial droop, vision changes, nausea, vomiting, diarrhea, fever or stiff neck   She was dizzy yesterday, has not fallen.   Eating and drinking well  Appointment schedule 5/19, declines virtual today

## 2025-05-19 ENCOUNTER — OFFICE VISIT (OUTPATIENT)
Dept: FAMILY MEDICINE | Facility: CLINIC | Age: 65
End: 2025-05-19
Payer: COMMERCIAL

## 2025-05-19 VITALS
OXYGEN SATURATION: 94 % | SYSTOLIC BLOOD PRESSURE: 111 MMHG | BODY MASS INDEX: 24.76 KG/M2 | DIASTOLIC BLOOD PRESSURE: 78 MMHG | HEIGHT: 55 IN | HEART RATE: 60 BPM | RESPIRATION RATE: 22 BRPM | TEMPERATURE: 98.2 F | WEIGHT: 107 LBS

## 2025-05-19 DIAGNOSIS — E78.5 HYPERLIPIDEMIA LDL GOAL <100: ICD-10-CM

## 2025-05-19 DIAGNOSIS — R55 PRE-SYNCOPE: ICD-10-CM

## 2025-05-19 DIAGNOSIS — H81.11 BENIGN PAROXYSMAL POSITIONAL VERTIGO, RIGHT: ICD-10-CM

## 2025-05-19 DIAGNOSIS — Z00.00 ROUTINE GENERAL MEDICAL EXAMINATION AT A HEALTH CARE FACILITY: Primary | ICD-10-CM

## 2025-05-19 DIAGNOSIS — R53.83 OTHER FATIGUE: ICD-10-CM

## 2025-05-19 DIAGNOSIS — G44.219 EPISODIC TENSION-TYPE HEADACHE, NOT INTRACTABLE: ICD-10-CM

## 2025-05-19 DIAGNOSIS — N18.31 STAGE 3A CHRONIC KIDNEY DISEASE (H): ICD-10-CM

## 2025-05-19 DIAGNOSIS — R73.03 PRE-DIABETES: ICD-10-CM

## 2025-05-19 LAB
ALBUMIN SERPL BCG-MCNC: 4.4 G/DL (ref 3.5–5.2)
ALP SERPL-CCNC: 91 U/L (ref 40–150)
ALT SERPL W P-5'-P-CCNC: 18 U/L (ref 0–50)
ANION GAP SERPL CALCULATED.3IONS-SCNC: 9 MMOL/L (ref 7–15)
AST SERPL W P-5'-P-CCNC: 25 U/L (ref 0–45)
BILIRUB SERPL-MCNC: 0.5 MG/DL
BUN SERPL-MCNC: 22.4 MG/DL (ref 8–23)
CALCIUM SERPL-MCNC: 9.5 MG/DL (ref 8.8–10.4)
CHLORIDE SERPL-SCNC: 104 MMOL/L (ref 98–107)
CHOLEST SERPL-MCNC: 254 MG/DL
CREAT SERPL-MCNC: 1.21 MG/DL (ref 0.51–0.95)
CREAT UR-MCNC: 18.6 MG/DL
EGFRCR SERPLBLD CKD-EPI 2021: 50 ML/MIN/1.73M2
ERYTHROCYTE [DISTWIDTH] IN BLOOD BY AUTOMATED COUNT: 11.9 % (ref 10–15)
EST. AVERAGE GLUCOSE BLD GHB EST-MCNC: 123 MG/DL
FASTING STATUS PATIENT QL REPORTED: NO
FASTING STATUS PATIENT QL REPORTED: NO
GLUCOSE SERPL-MCNC: 76 MG/DL (ref 70–99)
HBA1C MFR BLD: 5.9 % (ref 0–5.6)
HCO3 SERPL-SCNC: 29 MMOL/L (ref 22–29)
HCT VFR BLD AUTO: 40.9 % (ref 35–47)
HDLC SERPL-MCNC: 77 MG/DL
HGB BLD-MCNC: 13.2 G/DL (ref 11.7–15.7)
LDLC SERPL CALC-MCNC: 151 MG/DL
MCH RBC QN AUTO: 30.3 PG (ref 26.5–33)
MCHC RBC AUTO-ENTMCNC: 32.3 G/DL (ref 31.5–36.5)
MCV RBC AUTO: 94 FL (ref 78–100)
MICROALBUMIN UR-MCNC: <12 MG/L
MICROALBUMIN/CREAT UR: NORMAL MG/G{CREAT}
NONHDLC SERPL-MCNC: 177 MG/DL
PLATELET # BLD AUTO: 212 10E3/UL (ref 150–450)
POTASSIUM SERPL-SCNC: 4.1 MMOL/L (ref 3.4–5.3)
PROT SERPL-MCNC: 6.9 G/DL (ref 6.4–8.3)
RBC # BLD AUTO: 4.36 10E6/UL (ref 3.8–5.2)
SODIUM SERPL-SCNC: 142 MMOL/L (ref 135–145)
T4 FREE SERPL-MCNC: 0.9 NG/DL (ref 0.9–1.7)
TRIGL SERPL-MCNC: 131 MG/DL
TSH SERPL DL<=0.005 MIU/L-ACNC: 6.87 UIU/ML (ref 0.3–4.2)
WBC # BLD AUTO: 6.5 10E3/UL (ref 4–11)

## 2025-05-19 PROCEDURE — 84439 ASSAY OF FREE THYROXINE: CPT | Performed by: FAMILY MEDICINE

## 2025-05-19 PROCEDURE — 80053 COMPREHEN METABOLIC PANEL: CPT | Performed by: FAMILY MEDICINE

## 2025-05-19 PROCEDURE — 99396 PREV VISIT EST AGE 40-64: CPT | Mod: 25 | Performed by: FAMILY MEDICINE

## 2025-05-19 PROCEDURE — 3074F SYST BP LT 130 MM HG: CPT | Performed by: FAMILY MEDICINE

## 2025-05-19 PROCEDURE — 99214 OFFICE O/P EST MOD 30 MIN: CPT | Mod: 25 | Performed by: FAMILY MEDICINE

## 2025-05-19 PROCEDURE — 93000 ELECTROCARDIOGRAM COMPLETE: CPT | Performed by: FAMILY MEDICINE

## 2025-05-19 PROCEDURE — 1126F AMNT PAIN NOTED NONE PRSNT: CPT | Performed by: FAMILY MEDICINE

## 2025-05-19 PROCEDURE — 82043 UR ALBUMIN QUANTITATIVE: CPT | Performed by: FAMILY MEDICINE

## 2025-05-19 PROCEDURE — 80061 LIPID PANEL: CPT | Performed by: FAMILY MEDICINE

## 2025-05-19 PROCEDURE — 36415 COLL VENOUS BLD VENIPUNCTURE: CPT | Performed by: FAMILY MEDICINE

## 2025-05-19 PROCEDURE — G2211 COMPLEX E/M VISIT ADD ON: HCPCS | Performed by: FAMILY MEDICINE

## 2025-05-19 PROCEDURE — 84443 ASSAY THYROID STIM HORMONE: CPT | Performed by: FAMILY MEDICINE

## 2025-05-19 PROCEDURE — 83036 HEMOGLOBIN GLYCOSYLATED A1C: CPT | Performed by: FAMILY MEDICINE

## 2025-05-19 PROCEDURE — 82570 ASSAY OF URINE CREATININE: CPT | Performed by: FAMILY MEDICINE

## 2025-05-19 PROCEDURE — 85027 COMPLETE CBC AUTOMATED: CPT | Performed by: FAMILY MEDICINE

## 2025-05-19 PROCEDURE — 3078F DIAST BP <80 MM HG: CPT | Performed by: FAMILY MEDICINE

## 2025-05-19 SDOH — HEALTH STABILITY: PHYSICAL HEALTH: ON AVERAGE, HOW MANY MINUTES DO YOU ENGAGE IN EXERCISE AT THIS LEVEL?: 30 MIN

## 2025-05-19 SDOH — HEALTH STABILITY: PHYSICAL HEALTH: ON AVERAGE, HOW MANY DAYS PER WEEK DO YOU ENGAGE IN MODERATE TO STRENUOUS EXERCISE (LIKE A BRISK WALK)?: 7 DAYS

## 2025-05-19 ASSESSMENT — PATIENT HEALTH QUESTIONNAIRE - PHQ9
SUM OF ALL RESPONSES TO PHQ QUESTIONS 1-9: 10
SUM OF ALL RESPONSES TO PHQ QUESTIONS 1-9: 10
10. IF YOU CHECKED OFF ANY PROBLEMS, HOW DIFFICULT HAVE THESE PROBLEMS MADE IT FOR YOU TO DO YOUR WORK, TAKE CARE OF THINGS AT HOME, OR GET ALONG WITH OTHER PEOPLE: EXTREMELY DIFFICULT

## 2025-05-19 ASSESSMENT — ANXIETY QUESTIONNAIRES
6. BECOMING EASILY ANNOYED OR IRRITABLE: NOT AT ALL
5. BEING SO RESTLESS THAT IT IS HARD TO SIT STILL: NEARLY EVERY DAY
GAD7 TOTAL SCORE: 8
7. FEELING AFRAID AS IF SOMETHING AWFUL MIGHT HAPPEN: NOT AT ALL
4. TROUBLE RELAXING: NEARLY EVERY DAY
2. NOT BEING ABLE TO STOP OR CONTROL WORRYING: NOT AT ALL
7. FEELING AFRAID AS IF SOMETHING AWFUL MIGHT HAPPEN: NOT AT ALL
1. FEELING NERVOUS, ANXIOUS, OR ON EDGE: MORE THAN HALF THE DAYS
GAD7 TOTAL SCORE: 8
GAD7 TOTAL SCORE: 8
3. WORRYING TOO MUCH ABOUT DIFFERENT THINGS: NOT AT ALL

## 2025-05-19 ASSESSMENT — PAIN SCALES - GENERAL: PAINLEVEL_OUTOF10: NO PAIN (0)

## 2025-05-19 ASSESSMENT — PAIN SCALES - PAIN ENJOYMENT GENERAL ACTIVITY SCALE (PEG)
AVG_PAIN_PASTWEEK: 7
INTERFERED_GENERAL_ACTIVITY: 7
AVG_PAIN_PASTWEEK: 7
PEG_TOTALSCORE: INCOMPLETE
INTERFERED_GENERAL_ACTIVITY: 7

## 2025-05-19 ASSESSMENT — SOCIAL DETERMINANTS OF HEALTH (SDOH): HOW OFTEN DO YOU GET TOGETHER WITH FRIENDS OR RELATIVES?: NEVER

## 2025-05-19 NOTE — PATIENT INSTRUCTIONS
Dr. Maurice Basilio - [facility] Representative at: [phone].     Order placed for Physical Medicine & Rehabilitation - I think you might be a good candidate for botox injections for your headaches.             Patient Education   Preventive Care Advice   This is general advice given by our system to help you stay healthy. However, your care team may have specific advice just for you. Please talk to your care team about your preventive care needs.  Nutrition  Eat 5 or more servings of fruits and vegetables each day.  Try wheat bread, brown rice and whole grain pasta (instead of white bread, rice, and pasta).  Get enough calcium and vitamin D. Check the label on foods and aim for 100% of the RDA (recommended daily allowance).  Lifestyle  Exercise at least 150 minutes each week  (30 minutes a day, 5 days a week).  Do muscle strengthening activities 2 days a week. These help control your weight and prevent disease.  No smoking.  Wear sunscreen to prevent skin cancer.  Have a dental exam and cleaning every 6 months.  Yearly exams  See your health care team every year to talk about:  Any changes in your health.  Any medicines your care team has prescribed.  Preventive care, family planning, and ways to prevent chronic diseases.  Shots (vaccines)   HPV shots (up to age 26), if you've never had them before.  Hepatitis B shots (up to age 59), if you've never had them before.  COVID-19 shot: Get this shot when it's due.  Flu shot: Get a flu shot every year.  Tetanus shot: Get a tetanus shot every 10 years.  Pneumococcal, hepatitis A, and RSV shots: Ask your care team if you need these based on your risk.  Shingles shot (for age 50 and up)  General health tests  Diabetes screening:  Starting at age 35, Get screened for diabetes at least every 3 years.  If you are younger than age 35, ask your care team if you should be screened for diabetes.  Cholesterol test: At age 39, start having a cholesterol test  every 5 years, or more often if advised.  Bone density scan (DEXA): At age 50, ask your care team if you should have this scan for osteoporosis (brittle bones).  Hepatitis C: Get tested at least once in your life.  STIs (sexually transmitted infections)  Before age 24: Ask your care team if you should be screened for STIs.  After age 24: Get screened for STIs if you're at risk. You are at risk for STIs (including HIV) if:  You are sexually active with more than one person.  You don't use condoms every time.  You or a partner was diagnosed with a sexually transmitted infection.  If you are at risk for HIV, ask about PrEP medicine to prevent HIV.  Get tested for HIV at least once in your life, whether you are at risk for HIV or not.  Cancer screening tests  Cervical cancer screening: If you have a cervix, begin getting regular cervical cancer screening tests starting at age 21.  Breast cancer scan (mammogram): If you've ever had breasts, begin having regular mammograms starting at age 40. This is a scan to check for breast cancer.  Colon cancer screening: It is important to start screening for colon cancer at age 45.  Have a colonoscopy test every 10 years (or more often if you're at risk) Or, ask your provider about stool tests like a FIT test every year or Cologuard test every 3 years.  To learn more about your testing options, visit:   .  For help making a decision, visit:   https://bit.ly/qx31598.  Prostate cancer screening test: If you have a prostate, ask your care team if a prostate cancer screening test (PSA) at age 55 is right for you.  Lung cancer screening: If you are a current or former smoker ages 50 to 80, ask your care team if ongoing lung cancer screenings are right for you.  For informational purposes only. Not to replace the advice of your health care provider. Copyright   2023 [employer]. All rights reserved. Clinically reviewed by the [facility] Transitions Program. [employer]  241479 - REV 01/24.

## 2025-05-19 NOTE — PROGRESS NOTES
Preventive Care Visit  [facility]  Heather Mckenzie MD, Family Medicine  May 19, 2025      Assessment & Plan     Routine general medical examination at a health care facility       Benign paroxysmal positional vertigo, right  Discussed  Referred to PT for treatment  - Physical Therapy  Referral; Future    Episodic tension-type headache, not intractable  She is on good doses of both topamax and propranolol (not sure her blood pressure would tolerate more).  She is very tight in upper trapezius muscles and notes neck pain associated with her headaches.  I think she would be a good candidate for botox.   - Adult Physical Medicine and Rehab  Referral; Future    Stage 3a chronic kidney disease (H)     - Albumin Random Urine Quantitative with Creat Ratio; Future  - Comprehensive metabolic panel (BMP + Alb, Alk Phos, ALT, AST, Total. Bili, TP); Future    Hyperlipidemia LDL goal <100     - Lipid panel reflex to direct LDL Non-fasting; Future  - Comprehensive metabolic panel (BMP + Alb, Alk Phos, ALT, AST, Total. Bili, TP); Future    Pre-diabetes     - Hemoglobin A1c; Future  - Comprehensive metabolic panel (BMP + Alb, Alk Phos, ALT, AST, Total. Bili, TP); Future    Pre-syncope  Suspect this is the BPPV she's been having  Not orthostatic  - EKG 12-lead complete w/read - Clinics  - CBC with platelets; Future  - Comprehensive metabolic panel (BMP + Alb, Alk Phos, ALT, AST, Total. Bili, TP); Future    Other fatigue   R/o anemia, thyroid problems, etc.   - TSH with free T4 reflex; Future  - Comprehensive metabolic panel (BMP + Alb, Alk Phos, ALT, AST, Total. Bili, TP); Future    Patient has been advised of split billing requirements and indicates understanding: Yes        Depression Screening Follow Up        5/19/2025     2:23 PM   PHQ   PHQ-9 Total Score 10    Q9: Thoughts of better off dead/self-harm past 2 weeks Not at all       Patient-reported           Follow Up Actions Taken        Counseling  Appropriate preventive services were addressed with this patient via screening, questionnaire, or discussion as appropriate for fall prevention, nutrition, physical activity, Tobacco-use cessation, social engagement, weight loss and cognition.  Checklist reviewing preventive services available has been given to the patient.  Reviewed patient's diet, addressing concerns and/or questions.   Patient is at risk for social isolation and has been provided with information about the benefit of social connection.   She is at risk for psychosocial distress and has been provided with information to reduce risk.   The patient's PHQ-9 score is consistent with moderate depression. She was provided with information regarding depression.           Devon Atkinson is a 64 year old, presenting for the following:  Physical        5/19/2025     1:56 PM   Additional Questions   Roomed by Sugar CHOW CMA   Accompanied by Self          HPI     - Recheck CKD. No current urinary sx.    Migraine   Since your last clinic visit, how have your headaches changed?  Worsened she notes these making her feel dizzy  How often are you getting headaches or migraines? daily   Are you able to do normal daily activities when you have a migraine? No  Are you taking rescue/relief medications? (Select all that apply) sumatriptan (Imitrex)  How helpful is your rescue/relief medication?  I get some relief  Are you taking any medications to prevent migraines? (Select all that apply)  Inderal and Topamax  In the past 4 weeks, how often have you gone to urgent care or the emergency room because of your headaches?  0    Daily headache for the past few weeks.  Often will wake up with the headache and have to take imitrex once or twice.     Seems to be more in the back of her neck/occiput.  Mostly on the right side.     Feels groggy/dizzy.  Feels off balance.       Advance Care Planning    Discussed advance care planning with patient; however,  patient declined at this time.        5/19/2025   General Health   How would you rate your overall physical health? Good   Feel stress (tense, anxious, or unable to sleep) Rather much   (!) STRESS CONCERN      5/19/2025   Nutrition   Three or more servings of calcium each day? Yes   Diet: Regular (no restrictions)   How many servings of fruit and vegetables per day? (!) 2-3   How many sweetened beverages each day? 0-1         5/19/2025   Exercise   Days per week of moderate/strenous exercise 7 days   Average minutes spent exercising at this level 30 min         5/19/2025   Social Factors   Frequency of gathering with friends or relatives Never   Worry food won't last until get money to buy more No   Food not last or not have enough money for food? No   Do you have housing? (Housing is defined as stable permanent housing and does not include staying outside in a car, in a tent, in an abandoned building, in an overnight shelter, or couch-surfing.) Yes   Are you worried about losing your housing? No   Lack of transportation? No   Unable to get utilities (heat,electricity)? No   (!) SOCIAL CONNECTIONS CONCERN      5/19/2025   Fall Risk   Fallen 2 or more times in the past year? No    Trouble with walking or balance? No        Proxy-reported          5/19/2025   Dental   Dentist two times every year? Yes       Today's PHQ-9 Score:       5/19/2025     2:23 PM   PHQ-9 SCORE   PHQ-9 Total Score MyChart 10 (Moderate depression)   PHQ-9 Total Score 10        Patient-reported         5/19/2025   Substance Use   Alcohol more than 3/day or more than 7/wk No   Do you use any other substances recreationally? No     Social History     Tobacco Use    Smoking status: Never    Smokeless tobacco: Never   Vaping Use    Vaping status: Never Used   Substance Use Topics    Alcohol use: Yes     Comment: rare    Drug use: No           9/16/2024   LAST FHS-7 RESULTS   1st degree relative breast or ovarian cancer No   Any relative bilateral  "breast cancer No   Any male have breast cancer No   Any ONE woman have BOTH breast AND ovarian cancer No   Any woman with breast cancer before 50yrs No   2 or more relatives with breast AND/OR ovarian cancer No   2 or more relatives with breast AND/OR bowel cancer No        Mammogram Screening - Mammogram every 1-2 years updated in Health Maintenance based on mutual decision making        5/19/2025   STI Screening   New sexual partner(s) since last STI/HIV test? No     History of abnormal Pap smear: No - age 30-64 HPV with reflex Pap every 5 years recommended        Latest Ref Rng & Units 5/20/2022     9:21 AM 5/3/2017     4:00 PM 5/3/2017     3:53 PM   PAP / HPV   PAP  Negative for Intraepithelial Lesion or Malignancy (NILM)      PAP (Historical)    NIL    HPV 16 DNA Negative Negative  Negative     HPV 18 DNA Negative Negative  Negative     Other HR HPV Negative Negative  Negative       ASCVD Risk   The 10-year ASCVD risk score (Rose MONK, et al., 2019) is: 2.6%    Values used to calculate the score:      Age: 64 years      Sex: Female      Is Non- : No      Diabetic: No      Tobacco smoker: No      Systolic Blood Pressure: 100 mmHg      Is BP treated: No      HDL Cholesterol: 69 mg/dL      Total Cholesterol: 247 mg/dL           Reviewed and updated as needed this visit by Provider   Tobacco  Allergies  Meds  Problems  Med Hx  Surg Hx  Fam Hx                  Review of Systems  Constitutional, HEENT, cardiovascular, pulmonary, gi and gu systems are negative, except as otherwise noted.     Objective    Exam  /70   Pulse 65   Temp 98.2  F (36.8  C) (Tympanic)   Resp 22   Ht 1.397 m (4' 7\")   Wt 48.5 kg (107 lb)   SpO2 97%   BMI 24.87 kg/m     Estimated body mass index is 24.87 kg/m  as calculated from the following:    Height as of this encounter: 1.397 m (4' 7\").    Weight as of this encounter: 48.5 kg (107 lb).    Physical Exam  GENERAL: alert and no " distress  HENT: + Eddie hallpike to the right  NECK: no adenopathy, no asymmetry, masses, or scars  RESP: lungs clear to auscultation - no rales, rhonchi or wheezes  CV: regular rate and rhythm, normal S1 S2, no S3 or S4, no murmur, click or rub, no peripheral edema  ABDOMEN: soft, nontender, no hepatosplenomegaly, no masses and bowel sounds normal  MS: no gross musculoskeletal defects noted, no edema  NEURO: Normal strength and tone, sensory exam grossly normal, and mentation intact        Signed Electronically by: Heather Mckenzie MD

## 2025-05-20 ENCOUNTER — PATIENT OUTREACH (OUTPATIENT)
Dept: CARE COORDINATION | Facility: CLINIC | Age: 65
End: 2025-05-20
Payer: COMMERCIAL

## 2025-05-20 ENCOUNTER — RESULTS FOLLOW-UP (OUTPATIENT)
Dept: FAMILY MEDICINE | Facility: CLINIC | Age: 65
End: 2025-05-20

## 2025-05-20 DIAGNOSIS — E03.8 OTHER SPECIFIED HYPOTHYROIDISM: ICD-10-CM

## 2025-05-20 DIAGNOSIS — E03.9 HYPOTHYROIDISM, UNSPECIFIED TYPE: ICD-10-CM

## 2025-05-20 DIAGNOSIS — R94.4 DECREASED GFR: Primary | ICD-10-CM

## 2025-05-20 RX ORDER — LEVOTHYROXINE SODIUM 50 UG/1
50 TABLET ORAL
Qty: 90 TABLET | Refills: 0 | Status: SHIPPED | OUTPATIENT
Start: 2025-05-20

## 2025-05-20 NOTE — RESULT ENCOUNTER NOTE
Call patient AND mail letter:    TSH is elevated and free T4 is low.  This indicates and under active thyroid or HYPOthyroidism.  I would recommend starting levothyroxine 50 mcg daily.   Recheck lab in 2 months to make sure we are adequately replacing thyroid hormone. Prescription was sent to [facility].    Kidney function is down slightly.  Avoid NSAIDS (ibuprofen/aleve) and increase fluid intake. Recheck this in 2-3 months also.    The American Heart Association and American College of Cardiology recommends statins for anyone who's 10 year risk exceeds 7.5%, your risk is 3%, therefore a statin (cholesterol lowering drug) is not recommended at this time, but cholesterol is higher than desired.  Work on diet/exercise.  If you'd like to know more about risk, a CT coronary calcium scan might be useful.  This is a CT scan that evaluates how much (if any) calcified plaques are in the arteries of your heart.  Let me know if you would like me to order that.     The 10-year ASCVD risk score (Rose DK, et al., 2019) is: 3%    Values used to calculate the score:      Age: 64 years      Sex: Female      Is Non- : No      Diabetic: No      Tobacco smoker: No      Systolic Blood Pressure: 111 mmHg      Is BP treated: No      HDL Cholesterol: 77 mg/dL      Total Cholesterol: 254 mg/dL      Heather Mckenzie M.D.

## 2025-05-20 NOTE — LETTER
May 20, 2025      China Mckenzie  [address] [place] [address]        Dear ,    We are writing to inform you of your test results.    TSH is elevated and free T4 is low.  This indicates and under active thyroid or HYPOthyroidism.  I would recommend starting levothyroxine 50 mcg daily.   Recheck lab in 2 months to make sure we are adequately replacing thyroid hormone. Prescription was sent to [facility].     Kidney function is down slightly.  Avoid NSAIDS (ibuprofen/aleve) and increase fluid intake. Recheck this in 2-3 months also.     The American Heart Association and American College of Cardiology recommends statins for anyone who's 10 year risk exceeds 7.5%, your risk is 3%, therefore a statin (cholesterol lowering drug) is not recommended at this time, but cholesterol is higher than desired.  Work on diet/exercise.  If you'd like to know more about risk, a CT coronary calcium scan might be useful.  This is a CT scan that evaluates how much (if any) calcified plaques are in the arteries of your heart.  Let me know if you would like me to order that.      The 10-year ASCVD risk score (Rose MONK, et al., 2019) is: 3%    Values used to calculate the score:      Age: 64 years      Sex: Female      Is Non- : No      Diabetic: No      Tobacco smoker: No      Systolic Blood Pressure: 111 mmHg      Is BP treated: No      HDL Cholesterol: 77 mg/dL      Total Cholesterol: 254 mg/dL    Resulted Orders   Albumin Random Urine Quantitative with Creat Ratio   Result Value Ref Range    Creatinine Urine mg/dL 18.6 mg/dL      Comment:      The reference ranges have not been established in urine creatinine. The results should be integrated into the clinical context for interpretation.    Albumin Urine mg/L <12.0 mg/L      Comment:      The reference ranges have not been established in urine albumin. The results should be integrated into the clinical context for  interpretation.    Albumin Urine mg/g Cr        Comment:      Unable to calculate, urine albumin and/or urine creatinine is outside detectable limits.  Microalbuminuria is defined as an albumin:creatinine ratio of 17 to 299 for males and 25 to 299 for females. A ratio of albumin:creatinine of 300 or higher is indicative of overt proteinuria.  Due to biologic variability, positive results should be confirmed by a second, first-morning random or 24-hour timed urine specimen. If there is discrepancy, a third specimen is recommended. When 2 out of 3 results are in the microalbuminuria range, this is evidence for incipient nephropathy and warrants increased efforts at glucose control, blood pressure control, and institution of therapy with an angiotensin-converting-enzyme (ACE) inhibitor (if the patient can tolerate it).     Hemoglobin A1c   Result Value Ref Range    Estimated Average Glucose 123 (H) <117 mg/dL    Hemoglobin A1C 5.9 (H) 0.0 - 5.6 %      Comment:      Normal <5.7%   Prediabetes 5.7-6.4%    Diabetes 6.5% or higher     Note: Adopted from ADA consensus guidelines.   Lipid panel reflex to direct LDL Non-fasting   Result Value Ref Range    Cholesterol 254 (H) <200 mg/dL    Triglycerides 131 <150 mg/dL    Direct Measure HDL 77 >=50 mg/dL    LDL Cholesterol Calculated 151 (H) <100 mg/dL    Non HDL Cholesterol 177 (H) <130 mg/dL    Patient Fasting > 8hrs? No     Narrative    Cholesterol  Desirable: < 200 mg/dL  Borderline High: 200 - 239 mg/dL  High: >= 240 mg/dL    Triglycerides  Normal: < 150 mg/dL  Borderline High: 150 - 199 mg/dL  High: 200-499 mg/dL  Very High: >= 500 mg/dL    Direct Measure HDL  Female: >= 50 mg/dL   Male: >= 40 mg/dL    LDL Cholesterol  Desirable: < 100 mg/dL  Above Desirable: 100 - 129 mg/dL   Borderline High: 130 - 159 mg/dL   High:  160 - 189 mg/dL   Very High: >= 190 mg/dL    Non HDL Cholesterol  Desirable: < 130 mg/dL  Above Desirable: 130 - 159 mg/dL  Borderline High: 160 - 189  mg/dL  High: 190 - 219 mg/dL  Very High: >= 220 mg/dL   CBC with platelets   Result Value Ref Range    WBC Count 6.5 4.0 - 11.0 10e3/uL    RBC Count 4.36 3.80 - 5.20 10e6/uL    Hemoglobin 13.2 11.7 - 15.7 g/dL    Hematocrit 40.9 35.0 - 47.0 %    MCV 94 78 - 100 fL    MCH 30.3 26.5 - 33.0 pg    MCHC 32.3 31.5 - 36.5 g/dL    RDW 11.9 10.0 - 15.0 %    Platelet Count 212 150 - 450 10e3/uL   TSH with free T4 reflex   Result Value Ref Range    TSH 6.87 (H) 0.30 - 4.20 uIU/mL   Comprehensive metabolic panel (BMP + Alb, Alk Phos, ALT, AST, Total. Bili, TP)   Result Value Ref Range    Sodium 142 135 - 145 mmol/L    Potassium 4.1 3.4 - 5.3 mmol/L    Carbon Dioxide (CO2) 29 22 - 29 mmol/L    Anion Gap 9 7 - 15 mmol/L    Urea Nitrogen 22.4 8.0 - 23.0 mg/dL    Creatinine 1.21 (H) 0.51 - 0.95 mg/dL    GFR Estimate 50 (L) >60 mL/min/1.73m2      Comment:      eGFR calculated using 2021 CKD-EPI equation.    Calcium 9.5 8.8 - 10.4 mg/dL    Chloride 104 98 - 107 mmol/L    Glucose 76 70 - 99 mg/dL    Alkaline Phosphatase 91 40 - 150 U/L    AST 25 0 - 45 U/L    ALT 18 0 - 50 U/L    Protein Total 6.9 6.4 - 8.3 g/dL    Albumin 4.4 3.5 - 5.2 g/dL    Bilirubin Total 0.5 <=1.2 mg/dL    Patient Fasting > 8hrs? No    T4 free   Result Value Ref Range    Free T4 0.90 0.90 - 1.70 ng/dL       If you have any questions or concerns, please call the clinic at the number listed above.       Sincerely,      Heather Mckenzie MD    Electronically signed

## 2025-05-22 ENCOUNTER — PATIENT OUTREACH (OUTPATIENT)
Dept: CARE COORDINATION | Facility: CLINIC | Age: 65
End: 2025-05-22
Payer: COMMERCIAL

## 2025-05-30 PROBLEM — H81.11 BENIGN PAROXYSMAL POSITIONAL VERTIGO, RIGHT: Status: ACTIVE | Noted: 2025-05-30

## 2025-06-03 ENCOUNTER — PATIENT OUTREACH (OUTPATIENT)
Dept: CARE COORDINATION | Facility: CLINIC | Age: 65
End: 2025-06-03
Payer: COMMERCIAL

## 2025-07-16 PROBLEM — H81.11 BENIGN PAROXYSMAL POSITIONAL VERTIGO, RIGHT: Status: RESOLVED | Noted: 2025-05-30 | Resolved: 2025-07-16

## 2025-07-17 DIAGNOSIS — G89.4 CHRONIC PAIN SYNDROME: ICD-10-CM

## 2025-07-17 DIAGNOSIS — M12.9 ARTHROPATHY: ICD-10-CM

## 2025-07-17 RX ORDER — HYDROCODONE BITARTRATE AND ACETAMINOPHEN 5; 325 MG/1; MG/1
1 TABLET ORAL 2 TIMES DAILY PRN
Qty: 60 TABLET | Refills: 0 | Status: SHIPPED | OUTPATIENT
Start: 2025-07-17 | End: 2025-08-16

## 2025-07-21 ENCOUNTER — HOSPITAL ENCOUNTER (EMERGENCY)
Facility: CLINIC | Age: 65
Discharge: HOME | End: 2025-07-21
Payer: COMMERCIAL

## 2025-07-21 ENCOUNTER — APPOINTMENT (OUTPATIENT)
Dept: GENERAL RADIOLOGY | Facility: CLINIC | Age: 65
End: 2025-07-21
Payer: COMMERCIAL

## 2025-07-21 VITALS
TEMPERATURE: 98 F | DIASTOLIC BLOOD PRESSURE: 64 MMHG | HEART RATE: 85 BPM | SYSTOLIC BLOOD PRESSURE: 122 MMHG | OXYGEN SATURATION: 98 % | RESPIRATION RATE: 16 BRPM

## 2025-07-21 DIAGNOSIS — M25.532 LEFT WRIST PAIN: ICD-10-CM

## 2025-07-21 DIAGNOSIS — S81.851A DOG BITE OF RIGHT LOWER LEG, INITIAL ENCOUNTER: ICD-10-CM

## 2025-07-21 DIAGNOSIS — S50.312A ABRASION OF LEFT ELBOW, INITIAL ENCOUNTER: ICD-10-CM
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
## 2025-07-21 PROCEDURE — 250N000013 HC RX MED GY IP 250 OP 250 PS 637

## 2025-07-21 PROCEDURE — 12002 RPR S/N/AX/GEN/TRNK2.6-7.5CM: CPT

## 2025-07-21 PROCEDURE — G0463 HOSPITAL OUTPT CLINIC VISIT: HCPCS | Mod: 25

## 2025-07-21 PROCEDURE — 73110 X-RAY EXAM OF WRIST: CPT | Mod: LT

## 2025-07-21 PROCEDURE — 73130 X-RAY EXAM OF HAND: CPT | Mod: LT

## 2025-07-21 PROCEDURE — 73562 X-RAY EXAM OF KNEE 3: CPT | Mod: RT

## 2025-07-21 PROCEDURE — 250N000011 HC RX IP 250 OP 636

## 2025-07-21 PROCEDURE — 90471 IMMUNIZATION ADMIN: CPT

## 2025-07-21 PROCEDURE — 90715 TDAP VACCINE 7 YRS/> IM: CPT

## 2025-07-21 RX ORDER — ACETAMINOPHEN 500 MG
1000 TABLET ORAL ONCE
Status: COMPLETED | OUTPATIENT
Start: 2025-07-21 | End: 2025-07-21

## 2025-07-21 RX ADMIN — CLOSTRIDIUM TETANI TOXOID ANTIGEN (FORMALDEHYDE INACTIVATED), CORYNEBACTERIUM DIPHTHERIAE TOXOID ANTIGEN (FORMALDEHYDE INACTIVATED), BORDETELLA PERTUSSIS TOXOID ANTIGEN (GLUTARALDEHYDE INACTIVATED), BORDETELLA PERTUSSIS FILAMENTOUS HEMAGGLUTININ ANTIGEN (FORMALDEHYDE INACTIVATED), BORDETELLA PERTUSSIS PERTACTIN ANTIGEN, AND BORDETELLA PERTUSSIS FIMBRIAE 2/3 ANTIGEN 0.5 ML: 5; 2; 2.5; 5; 3; 5 INJECTION, SUSPENSION INTRAMUSCULAR at 17:38

## 2025-07-21 RX ADMIN — ACETAMINOPHEN 1000 MG: 500 TABLET, FILM COATED ORAL at 15:57

## 2025-07-21 ASSESSMENT — COLUMBIA-SUICIDE SEVERITY RATING SCALE - C-SSRS
1. IN THE PAST MONTH, HAVE YOU WISHED YOU WERE DEAD OR WISHED YOU COULD GO TO SLEEP AND NOT WAKE UP?: NO
6. HAVE YOU EVER DONE ANYTHING, STARTED TO DO ANYTHING, OR PREPARED TO DO ANYTHING TO END YOUR LIFE?: NO
2. HAVE YOU ACTUALLY HAD ANY THOUGHTS OF KILLING YOURSELF IN THE PAST MONTH?: NO

## 2025-07-21 ASSESSMENT — ACTIVITIES OF DAILY LIVING (ADL)
ADLS_ACUITY_SCORE: 41
ADLS_ACUITY_SCORE: 41

## 2025-07-21 NOTE — DISCHARGE INSTRUCTIONS
-Keep the wound clean and dry.  You can shower and wash it with soap and water.  No hot tubs, swimming or soaking until the wound is healed.   Please read the attached instructions for additional information.  You can use Tylenol or ibuprofen for pain.    - Follow-up with your primary doctor later this week to have a wound recheck.  Have another follow-up in 1 week to have the stitches removed.   - Use ice and wrist brace as needed for your left wrist pain.   - Return to the ER if you develop any fever, pus coming from the wound, severe pain or any other new or concerning symptoms.
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
## 2025-07-22 ENCOUNTER — TELEPHONE (OUTPATIENT)
Dept: FAMILY MEDICINE | Facility: CLINIC | Age: 65
End: 2025-07-22
Payer: COMMERCIAL

## 2025-07-22 ENCOUNTER — HOSPITAL ENCOUNTER (EMERGENCY)
Facility: CLINIC | Age: 65
Discharge: HOME | End: 2025-07-22
Payer: COMMERCIAL

## 2025-07-22 VITALS
HEART RATE: 73 BPM | BODY MASS INDEX: 24.76 KG/M2 | DIASTOLIC BLOOD PRESSURE: 84 MMHG | WEIGHT: 107 LBS | SYSTOLIC BLOOD PRESSURE: 131 MMHG | RESPIRATION RATE: 20 BRPM | HEIGHT: 55 IN | TEMPERATURE: 99.7 F | OXYGEN SATURATION: 96 %
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
## 2025-07-22 DIAGNOSIS — M79.604 RIGHT LEG PAIN: Primary | ICD-10-CM

## 2025-07-22 DIAGNOSIS — Z51.89 VISIT FOR WOUND CHECK: ICD-10-CM

## 2025-07-22 PROCEDURE — 99283 EMERGENCY DEPT VISIT LOW MDM: CPT

## 2025-07-22 PROCEDURE — 99282 EMERGENCY DEPT VISIT SF MDM: CPT

## 2025-07-22 RX ORDER — ONDANSETRON 4 MG/1
4 TABLET, ORALLY DISINTEGRATING ORAL ONCE
Status: DISCONTINUED | OUTPATIENT
Start: 2025-07-22 | End: 2025-07-22 | Stop reason: HOSPADM

## 2025-07-22 ASSESSMENT — COLUMBIA-SUICIDE SEVERITY RATING SCALE - C-SSRS
6. HAVE YOU EVER DONE ANYTHING, STARTED TO DO ANYTHING, OR PREPARED TO DO ANYTHING TO END YOUR LIFE?: NO
1. IN THE PAST MONTH, HAVE YOU WISHED YOU WERE DEAD OR WISHED YOU COULD GO TO SLEEP AND NOT WAKE UP?: NO
2. HAVE YOU ACTUALLY HAD ANY THOUGHTS OF KILLING YOURSELF IN THE PAST MONTH?: NO

## 2025-07-22 ASSESSMENT — ACTIVITIES OF DAILY LIVING (ADL): ADLS_ACUITY_SCORE: 41

## 2025-07-22 NOTE — TELEPHONE ENCOUNTER
FYI - Status Update    Who is Calling: patient    Update: Patient hasn't received RX for antibiotics yet. Please send antibiotics rx and contact patient     Does caller want a call/response back: Yes     Could we send this information to you in [place] or would you prefer to receive a phone call?:   Patient would prefer a phone call   Okay to leave a detailed message?: Yes at Cell number on file:    Telephone Information:   Mobile [phone]

## 2025-07-22 NOTE — TELEPHONE ENCOUNTER
Pt calling to follow up on request below. Writer advised that antibiotics were sent to pharmacy last night; pt says that there was nothing there when her  went to pick it up yesterday. She also asks if it could be resent to [facility]. Writer called pharmacy to follow up. They said that the prescription is ready for pickup, and if pt would like to fill at a different pharmacy, they can have the pharmacy call to transfer the script. Pt notified, and verbalized understanding.    Estrella Ramirez RN  [facility]

## 2025-07-22 NOTE — ED PROVIDER NOTES
"[facility]  Emergency Department Visit Note    PATIENT:  China Mckenzie     64 year old     female      [id number]    Chief complaint:  Chief Complaint   Patient presents with    Dog Bite          History of present illness:  Patient is a 64 year old female with generalized anxiety disorder, CKD, chronic opioid use presenting for evaluation of nausea.    She was seen yesterday for evaluation of dog bite.  Imaging findings negative for retained foreign body.  Wounds were closed.  Dog immunized.  Sent with prescriptions for amoxicillin/clavulanate which she filled and started taking today.    She returns to the emergency department now for right leg pain.   reports patient had difficulty sleeping overnight due to the pain.  Patient localizes pain primarily over the right knee/calf wound.  States over the past 24 hours she has taken five of her 5-325 hydrocodone/acetaminophen tablets.  She picked up the amoxicillin/clavulanate that was prescribed yesterday around 11:00 this morning (pharmacies closed yesterday).  She took a dose around 11, then a subsequent dose around 3 PM because she was told to take it 2 times daily.    Both she and  feel like the wound on leg looks quite a bit better today compared to yesterday.    Patient and has been getting ready to come to the hospital for evaluation of leg pain when she started having some nausea with vomiting.  No abdominal pain.  No fevers.  No chest pain or dyspnea.  She does not have significant concerns over the other injuries or wounds.      Review of Systems:  As in HPI above    /84   Pulse 73   Temp 99.7  F (37.6  C) (Oral)   Resp 20   Ht 1.397 m (4' 7\")   Wt 48.5 kg (107 lb)   SpO2 96%   BMI 24.87 kg/m        Physical Exam:  Constitutional: laying in hospital bed, alert, oriented, answering questions appropriately  HEENT: normocephalic, atraumatic  Cardiovascular: regular rate and rhythm  Pulmonary: breathing " comfortably on room air  Abdominal: soft, non-tender, non-distended  Extremities/MSK: Area over right lower extremity bite wound as below is diffusely tender around the bite wound as well as around the areas of evolving ecchymosis and redness both proximal and distal to the wound itself.  There is no drainage.  Calf compartments are soft.  Thigh compartments are similarly soft.  DP pulse present right foot.  Bilateral distal lower extremity are warm and appear equally well-perfused.            Skin: warm, dry  Neurologic: She is able to flex bilateral lower extremities off of the bed.  5 out of 5 dorsiflexion/plantarflexion strength bilaterally, sensation intact and equal bilateral lower extremities  Psychiatric: calm, appropriate      MDM:  Patient is a 64 year old female with above history presenting for evaluation of leg pain.    Vitals are normal, she is afebrile. Exam with areas of evolving ecchymosis and redness as well as generalized tenderness over the bite wound.    Suspect she is continuing to experience pain from the bite itself and localized trauma and edema related to this.  Ecchymoses are expected.  She does have some areas of redness that are scattered and amorphous and not clearly consistent with developing infection though she is clearly at risk and remains at risk which we discussed.  At this point I feel ongoing prophylaxis with amoxicillin/clavulanate is an appropriate step but provided strict ED return in the event of any worsening of the redness, fevers, or discharge/drainage from the wound itself.  I think labs are of limited utility today.  Compartments are all soft and there are no findings at this point concerning for developing compartment syndrome.    We did discuss appropriate timing of antibiotics and that she took the antibiotics today too close together which may have contributed to her episode of nausea.  No vomiting in the ED. Completely non-tender abdomen. Will send with  prescription for ondansetron for nausea going forward.  Will instruct her to take an additional dose of the amoxicillin/clavulanate later tonight before bed and then resume twice daily dosing tomorrow but discussed spacing this out 12 hours.    Did reinforce the importance of following up with primary clinic later this week for wound check and for suture removal/wound check next week.    I offered her oxycodone in the emergency department but she feels like she would like to just wait to take her medications at home.  She feels like she has enough of her home hydrocodone acetaminophen to manage pain.   expressed concerns given they came to the emergency department for pain but she insisted that she did not want any further analgesics in the emergency department which is certainly her decision.  We discussed ice, elevation, and acetaminophen as well for pain.  They expressed understanding of and agreement with the above plan.    Disposition discharge. Remainder of ED course below.    ED COURSE:       Encounter Diagnoses:  Final diagnoses:   Right leg pain   Dog bite, initial encounter   Visit for wound check       Final disposition: discharge    Bob Gonzales MD  7/22/2025  4:38 PM   Emergency Medicine  [facility]      Bob Gonzales MD  07/22/25 4705

## 2025-07-22 NOTE — DISCHARGE INSTRUCTIONS
You were seen in the emergency department today for leg pain.  As we discussed, the pain is likely due to localized trauma of skin, muscles, and other soft tissues related to the bite.  This should improve with time as you get further out from the injury.    You can continue taking your previously prescribed hydrocodone/acetaminophen (Norco) to help with pain.  I also recommend keeping the leg elevated to help with swelling, and placing ice over the leg to help with swelling.    You do need to continue taking the amoxicillin/clavulanate (also called Augmentin) antibiotic as prescribed.  You should take 1 more dose (1 tablet) tonight is late as you can before bed (ideally around 11 PM).  Then, please start taking the antibiotic twice daily starting tomorrow morning and try to space these out 12 hours.    Please follow up with your primary doctor in the next several days for ongoing evaluation and management of your symptoms.    As we discussed, these types of wounds are high risk for infection.  There is no evidence currently for infection but if you develop worsening swelling or redness of the leg, worsening pain, fevers, or discharge/drainage from the wound itself you should return to the Emergency Department for evaluation.    I did give you a prescription for a nausea medication called ondansetron, also called Zofran, that you can take as needed for nausea and vomiting which I suspect was related to taking the doses of antibiotics too close together today.    Return to the emergency department with new or worsening symptoms that you find concerning.

## 2025-07-22 NOTE — ED TRIAGE NOTES
"Patient here for a dog bite that she was seen for yesterday. Patient states she is nauseous and has had vomiting. Has taken 5 hydrocodone today and appears somnolent. States \"I don't usually do that but I was in so much pain.\" Vitals WNL. States she started her antibiotics this afternoon as she was not able to fill the prescription last night.     Triage Assessment (Adult)       Row Name 07/22/25 8529          Triage Assessment    Airway WDL WDL        Respiratory WDL    Respiratory WDL WDL        Skin Circulation/Temperature WDL    Skin Circulation/Temperature WDL WDL        Cardiac WDL    Cardiac WDL WDL        Peripheral/Neurovascular WDL    Peripheral Neurovascular WDL WDL        Cognitive/Neuro/Behavioral WDL    Cognitive/Neuro/Behavioral WDL WDL                     "

## 2025-07-22 NOTE — ED NOTES
Wrapped right knee/dog bite sutures with antibacterial ointment, ABD and kerlix roll. Patient tolerated well.    Patient refused zofran, stating she no longer has nausea.

## 2025-07-23 ENCOUNTER — TELEPHONE (OUTPATIENT)
Dept: FAMILY MEDICINE | Facility: CLINIC | Age: 65
End: 2025-07-23

## 2025-07-23 NOTE — TELEPHONE ENCOUNTER
"Patient was bitten by a dog on Monday on her right and went to the Emergency Room yesterday and patient was supposed to come to an appointment today for follow up.     Patient missed her appointment and the below information was documented as to why.     \"Pts. spouse walked in for pts appt @ 3:56pm. Called pt. from cell phone in car in parking lot. Explained needing to ask [facility], if pt. can be seen, being late. Pt. agreed she still wanted to be seen by Ansley. Got back on phone with pt. and pt. refused to be seen for todays appt. No showed her appt with Ansley Lemus. Pt. explained seen day before, did not want appt. for today.\"     Patient requested to be rescheduled.     RN schedule emergency Room follow up for tomorrow with Bernadine Gonzalez.     Marilyn Collazo, RN on 7/23/2025 at 5:20 PM    "

## 2025-07-24 ENCOUNTER — OFFICE VISIT (OUTPATIENT)
Dept: FAMILY MEDICINE | Facility: CLINIC | Age: 65
End: 2025-07-24
Payer: COMMERCIAL

## 2025-07-24 VITALS
HEART RATE: 56 BPM | TEMPERATURE: 97.4 F | OXYGEN SATURATION: 98 % | RESPIRATION RATE: 16 BRPM | DIASTOLIC BLOOD PRESSURE: 64 MMHG | BODY MASS INDEX: 24.76 KG/M2 | HEIGHT: 55 IN | WEIGHT: 107 LBS | SYSTOLIC BLOOD PRESSURE: 118 MMHG

## 2025-07-24 DIAGNOSIS — S81.851A DOG BITE OF RIGHT LOWER LEG, INITIAL ENCOUNTER: Primary | ICD-10-CM
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
## 2025-07-24 DIAGNOSIS — L08.9 LOCALIZED INFECTION OF SKIN: ICD-10-CM

## 2025-07-24 DIAGNOSIS — S89.91XA SOFT TISSUE INJURY OF RIGHT LOWER LEG, INITIAL ENCOUNTER: ICD-10-CM

## 2025-07-24 ASSESSMENT — PAIN SCALES - GENERAL: PAINLEVEL_OUTOF10: NO PAIN (0)

## 2025-07-24 NOTE — PROGRESS NOTES
Assessment & Plan       ICD-10-CM    1. Dog bite of right lower leg, initial encounter  S81.851A Crutches Order for DME - ONLY FOR DME    W54.0XXA       2. Soft tissue injury of right lower leg, initial encounter  S89.91XA       3. Localized infection of skin  L08.9         Reevaluation of dog bite completed today in clinic significant soft tissue injury with localized swelling and erythremia.  Concern for infection however she is only had 1-1/2 days of total antibiotic after vomiting 1 dose.  Discussed close follow-up and monitoring for additional signs of infection including fever body aches and chills and worsening pain.  Expressed to patient anticipate further ecchymosis.  Dressing applied to right lower extremity over the wound and advised regular dressing changes and wound monitoring to assure healing.  Plan following up for suture removal in approximately 1 week.    MED REC REQUIRED  Post Medication Reconciliation Status: discharge medications reconciled, continue medications without change      Subjective   China is a 64 year old, presenting for the following health issues:  ER F/U        7/24/2025     2:23 PM   Additional Questions   Roomed by Allison MCCARTY MA     [facility]        ED/UC Followup:    Facility:  [facility]  Date of visit: 07/21/2025 and 7/22/2025  Reason for visit: Dog bite of lower leg   Current Status: Initial visit 7/21/25. Patient received 16 sutures at time. Returned to ED next day due to worsening pain.   Feeling ok-still having some pain.     Bit by a dog on 7/21/25. She needed 16 sutures placed due to the bite. She was prescribed augmentin. She had to go back to the ER due to pain the following day.     Pain is significant but improved.     She has taken 4 doses of the augmentin however she vomited the first dose.     She is requesting crutches to help with ambulation.      Review of Systems  Constitutional, HEENT, cardiovascular, pulmonary, gi and gu systems are negative,  "except as otherwise noted.      Objective    /64   Pulse 56   Temp 97.4  F (36.3  C) (Tympanic)   Resp 16   Ht 1.397 m (4' 7\")   Wt 48.5 kg (107 lb)   SpO2 98%   BMI 24.87 kg/m    Body mass index is 24.87 kg/m .  Physical Exam   GENERAL: alert and no distress  SKIN: see photo below. Swelling, erythremia and bruising noted of right lower extremity with sutures in place from dog bite.  Marker utilized to border at the edges            Signed Electronically by: LARISA Jain CNP    "

## 2025-07-27 ENCOUNTER — HOSPITAL ENCOUNTER (EMERGENCY)
Facility: CLINIC | Age: 65
Discharge: HOME | End: 2025-07-27
Attending: FAMILY MEDICINE | Admitting: FAMILY MEDICINE
Payer: COMMERCIAL

## 2025-07-27 VITALS
TEMPERATURE: 98.6 F | WEIGHT: 104 LBS | OXYGEN SATURATION: 98 % | DIASTOLIC BLOOD PRESSURE: 61 MMHG | RESPIRATION RATE: 18 BRPM | SYSTOLIC BLOOD PRESSURE: 141 MMHG | HEIGHT: 55 IN | BODY MASS INDEX: 24.07 KG/M2 | HEART RATE: 60 BPM

## 2025-07-27 DIAGNOSIS — L03.115 CELLULITIS OF RIGHT LEG: Primary | ICD-10-CM

## 2025-07-27 PROCEDURE — 250N000013 HC RX MED GY IP 250 OP 250 PS 637: Performed by: FAMILY MEDICINE

## 2025-07-27 PROCEDURE — 99283 EMERGENCY DEPT VISIT LOW MDM: CPT | Performed by: FAMILY MEDICINE

## 2025-07-27 RX ORDER — SULFAMETHOXAZOLE AND TRIMETHOPRIM 800; 160 MG/1; MG/1
1 TABLET ORAL ONCE
Status: COMPLETED | OUTPATIENT
Start: 2025-07-27 | End: 2025-07-27

## 2025-07-27 RX ORDER — SULFAMETHOXAZOLE AND TRIMETHOPRIM 800; 160 MG/1; MG/1
1 TABLET ORAL 2 TIMES DAILY
Qty: 20 TABLET | Refills: 0 | Status: SHIPPED | OUTPATIENT
Start: 2025-07-27 | End: 2025-08-06

## 2025-07-27 RX ADMIN — SULFAMETHOXAZOLE AND TRIMETHOPRIM 1 TABLET: 800; 160 TABLET ORAL at 19:54

## 2025-07-27 ASSESSMENT — ACTIVITIES OF DAILY LIVING (ADL)
ADLS_ACUITY_SCORE: 41
ADLS_ACUITY_SCORE: 41

## 2025-07-27 ASSESSMENT — COLUMBIA-SUICIDE SEVERITY RATING SCALE - C-SSRS
1. IN THE PAST MONTH, HAVE YOU WISHED YOU WERE DEAD OR WISHED YOU COULD GO TO SLEEP AND NOT WAKE UP?: NO
2. HAVE YOU ACTUALLY HAD ANY THOUGHTS OF KILLING YOURSELF IN THE PAST MONTH?: NO
6. HAVE YOU EVER DONE ANYTHING, STARTED TO DO ANYTHING, OR PREPARED TO DO ANYTHING TO END YOUR LIFE?: NO

## 2025-07-27 NOTE — ED TRIAGE NOTES
Dog bite last Monday afternoon near left leg with 16 sutures.       Triage Assessment (Adult)       Row Name 07/27/25 1444          Triage Assessment    Airway WDL WDL        Skin Circulation/Temperature WDL    Skin Circulation/Temperature WDL X

## 2025-07-28 ENCOUNTER — HOSPITAL ENCOUNTER (EMERGENCY)
Facility: CLINIC | Age: 65
Discharge: HOME | End: 2025-07-28
Attending: PHYSICIAN ASSISTANT
Payer: COMMERCIAL

## 2025-07-28 VITALS
HEART RATE: 66 BPM | RESPIRATION RATE: 16 BRPM | OXYGEN SATURATION: 97 % | DIASTOLIC BLOOD PRESSURE: 66 MMHG | SYSTOLIC BLOOD PRESSURE: 115 MMHG | TEMPERATURE: 97.7 F

## 2025-07-28 DIAGNOSIS — L03.90 CELLULITIS: Primary | ICD-10-CM

## 2025-07-28 PROCEDURE — 87070 CULTURE OTHR SPECIMN AEROBIC: CPT | Performed by: PHYSICIAN ASSISTANT

## 2025-07-28 PROCEDURE — 99213 OFFICE O/P EST LOW 20 MIN: CPT | Performed by: PHYSICIAN ASSISTANT

## 2025-07-28 PROCEDURE — G0463 HOSPITAL OUTPT CLINIC VISIT: HCPCS | Performed by: PHYSICIAN ASSISTANT

## 2025-07-28 ASSESSMENT — COLUMBIA-SUICIDE SEVERITY RATING SCALE - C-SSRS
2. HAVE YOU ACTUALLY HAD ANY THOUGHTS OF KILLING YOURSELF IN THE PAST MONTH?: NO
1. IN THE PAST MONTH, HAVE YOU WISHED YOU WERE DEAD OR WISHED YOU COULD GO TO SLEEP AND NOT WAKE UP?: NO
6. HAVE YOU EVER DONE ANYTHING, STARTED TO DO ANYTHING, OR PREPARED TO DO ANYTHING TO END YOUR LIFE?: NO

## 2025-07-28 ASSESSMENT — ACTIVITIES OF DAILY LIVING (ADL): ADLS_ACUITY_SCORE: 41

## 2025-07-28 NOTE — DISCHARGE INSTRUCTIONS
We are adding additional antibiotic therapy to cover for resistant staph infection.   Continue Augmentin.    Add trimethoprim-sulfamethoxazole 1 tablet twice daily for 7 days.  Rest and elevate your leg is much as possible over the next 2 days.  Be seen in the clinic or in urgent care for recheck of the wound in 2 days.  If you are experiencing increased pain, increased redness or develop fevers or chills at any time, return to the emergency department.
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
## 2025-07-30 LAB — BACTERIA WND CULT: NO GROWTH

## 2025-08-05 ENCOUNTER — TELEPHONE (OUTPATIENT)
Dept: FAMILY MEDICINE | Facility: CLINIC | Age: 65
End: 2025-08-05

## 2025-08-06 ENCOUNTER — OFFICE VISIT (OUTPATIENT)
Dept: FAMILY MEDICINE | Facility: CLINIC | Age: 65
End: 2025-08-06
Payer: COMMERCIAL

## 2025-08-06 VITALS
TEMPERATURE: 98.1 F | DIASTOLIC BLOOD PRESSURE: 70 MMHG | OXYGEN SATURATION: 97 % | SYSTOLIC BLOOD PRESSURE: 110 MMHG | RESPIRATION RATE: 18 BRPM | HEART RATE: 69 BPM
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
## 2025-08-06 DIAGNOSIS — S81.851A DOG BITE OF RIGHT LOWER LEG, INITIAL ENCOUNTER: ICD-10-CM

## 2025-08-06 DIAGNOSIS — G43.009 MIGRAINE WITHOUT AURA AND WITHOUT STATUS MIGRAINOSUS, NOT INTRACTABLE: ICD-10-CM

## 2025-08-06 DIAGNOSIS — L03.119 CELLULITIS AND ABSCESS OF LEG: ICD-10-CM

## 2025-08-06 DIAGNOSIS — F11.90 CHRONIC, CONTINUOUS USE OF OPIOIDS: Primary | ICD-10-CM

## 2025-08-06 DIAGNOSIS — L02.619 CELLULITIS AND ABSCESS OF FOOT EXCLUDING TOE: ICD-10-CM

## 2025-08-06 DIAGNOSIS — M62.830 BACK MUSCLE SPASM: ICD-10-CM

## 2025-08-06 DIAGNOSIS — G89.4 CHRONIC PAIN SYNDROME: ICD-10-CM

## 2025-08-06 DIAGNOSIS — L03.119 CELLULITIS AND ABSCESS OF FOOT EXCLUDING TOE: ICD-10-CM

## 2025-08-06 DIAGNOSIS — L02.419 CELLULITIS AND ABSCESS OF LEG: ICD-10-CM

## 2025-08-06 DIAGNOSIS — M12.9 ARTHROPATHY: ICD-10-CM

## 2025-08-06 PROCEDURE — 99214 OFFICE O/P EST MOD 30 MIN: CPT | Performed by: FAMILY MEDICINE

## 2025-08-06 PROCEDURE — 3074F SYST BP LT 130 MM HG: CPT | Performed by: FAMILY MEDICINE

## 2025-08-06 PROCEDURE — 3078F DIAST BP <80 MM HG: CPT | Performed by: FAMILY MEDICINE

## 2025-08-06 RX ORDER — TOPIRAMATE 100 MG/1
200 TABLET, FILM COATED ORAL DAILY
Qty: 180 TABLET | Refills: 3 | Status: SHIPPED | OUTPATIENT
Start: 2025-08-06

## 2025-08-06 RX ORDER — HYDROCODONE BITARTRATE AND ACETAMINOPHEN 5; 325 MG/1; MG/1
1 TABLET ORAL 2 TIMES DAILY PRN
Qty: 60 TABLET | Refills: 0 | Status: SHIPPED | OUTPATIENT
Start: 2025-08-14

## 2025-08-06 RX ORDER — PROPRANOLOL HYDROCHLORIDE 60 MG/1
60 CAPSULE, EXTENDED RELEASE ORAL DAILY
Qty: 90 CAPSULE | Refills: 3 | Status: SHIPPED | OUTPATIENT
Start: 2025-08-06

## 2025-08-06 RX ORDER — SUMATRIPTAN SUCCINATE 100 MG/1
TABLET ORAL
Qty: 18 TABLET | Refills: 11 | Status: SHIPPED | OUTPATIENT
Start: 2025-08-06

## 2025-08-06 RX ORDER — CYCLOBENZAPRINE HCL 10 MG
10 TABLET ORAL 3 TIMES DAILY PRN
Qty: 30 TABLET | Refills: 5 | Status: SHIPPED | OUTPATIENT
Start: 2025-08-06

## 2025-08-12 ENCOUNTER — TELEPHONE (OUTPATIENT)
Dept: FAMILY MEDICINE | Facility: CLINIC | Age: 65
End: 2025-08-12

## 2025-08-12 ENCOUNTER — LAB (OUTPATIENT)
Dept: LAB | Facility: CLINIC | Age: 65
End: 2025-08-12
Payer: COMMERCIAL

## 2025-08-12 DIAGNOSIS — F11.90 CHRONIC, CONTINUOUS USE OF OPIOIDS: ICD-10-CM

## 2025-08-12 PROCEDURE — 80306 DRUG TEST PRSMV INSTRMNT: CPT

## 2025-08-13 ENCOUNTER — RESULTS FOLLOW-UP (OUTPATIENT)
Dept: FAMILY MEDICINE | Facility: CLINIC | Age: 65
End: 2025-08-13

## 2025-08-13 ENCOUNTER — TELEPHONE (OUTPATIENT)
Dept: FAMILY MEDICINE | Facility: CLINIC | Age: 65
End: 2025-08-13

## 2025-08-13 ENCOUNTER — OFFICE VISIT (OUTPATIENT)
Dept: FAMILY MEDICINE | Facility: CLINIC | Age: 65
End: 2025-08-13
Payer: COMMERCIAL

## 2025-08-13 VITALS
TEMPERATURE: 99 F | DIASTOLIC BLOOD PRESSURE: 72 MMHG | RESPIRATION RATE: 18 BRPM | HEART RATE: 60 BPM | BODY MASS INDEX: 24.3 KG/M2 | OXYGEN SATURATION: 99 % | HEIGHT: 55 IN | SYSTOLIC BLOOD PRESSURE: 114 MMHG | WEIGHT: 105 LBS
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
## 2025-08-13 DIAGNOSIS — S81.851D DOG BITE OF RIGHT LOWER LEG, SUBSEQUENT ENCOUNTER: Primary | ICD-10-CM

## 2025-08-13 LAB
AMPHETAMINES UR QL: NOT DETECTED
BARBITURATES UR QL SCN: NOT DETECTED
BENZODIAZ UR QL SCN: NOT DETECTED
BUPRENORPHINE UR QL: NOT DETECTED
CANNABINOIDS UR QL: NOT DETECTED
COCAINE UR QL SCN: NOT DETECTED
D-METHAMPHET UR QL: NOT DETECTED
METHADONE UR QL SCN: NOT DETECTED
OPIATES UR QL SCN: NOT DETECTED
OXYCODONE UR QL SCN: NOT DETECTED
PCP UR QL SCN: NOT DETECTED
TRICYCLICS UR QL SCN: NOT DETECTED

## 2025-08-13 PROCEDURE — 3074F SYST BP LT 130 MM HG: CPT | Performed by: FAMILY MEDICINE

## 2025-08-13 PROCEDURE — 3078F DIAST BP <80 MM HG: CPT | Performed by: FAMILY MEDICINE

## 2025-08-13 PROCEDURE — 99214 OFFICE O/P EST MOD 30 MIN: CPT | Performed by: FAMILY MEDICINE

## 2025-08-13 PROCEDURE — 1125F AMNT PAIN NOTED PAIN PRSNT: CPT | Performed by: FAMILY MEDICINE

## 2025-08-13 RX ORDER — SULFAMETHOXAZOLE AND TRIMETHOPRIM 800; 160 MG/1; MG/1
1 TABLET ORAL 2 TIMES DAILY
Qty: 14 TABLET | Refills: 0 | Status: SHIPPED | OUTPATIENT
Start: 2025-08-13 | End: 2025-08-20

## 2025-08-13 RX ORDER — BACITRACIN ZINC 500 [USP'U]/G
OINTMENT TOPICAL 2 TIMES DAILY
Qty: 113 G | Refills: 0 | Status: SHIPPED | OUTPATIENT
Start: 2025-08-13

## 2025-08-13 ASSESSMENT — PAIN SCALES - GENERAL: PAINLEVEL_OUTOF10: MILD PAIN (1)

## 2025-08-18 ENCOUNTER — NURSE TRIAGE (OUTPATIENT)
Dept: FAMILY MEDICINE | Facility: CLINIC | Age: 65
End: 2025-08-18

## 2025-08-18 ENCOUNTER — HOSPITAL ENCOUNTER (EMERGENCY)
Facility: CLINIC | Age: 65
Discharge: HOME | End: 2025-08-18
Attending: NURSE PRACTITIONER
Payer: COMMERCIAL

## 2025-08-18 ENCOUNTER — PATIENT OUTREACH (OUTPATIENT)
Dept: CARE COORDINATION | Facility: CLINIC | Age: 65
End: 2025-08-18
Payer: COMMERCIAL

## 2025-08-18 VITALS
RESPIRATION RATE: 20 BRPM | DIASTOLIC BLOOD PRESSURE: 75 MMHG | HEART RATE: 61 BPM | OXYGEN SATURATION: 97 % | TEMPERATURE: 98.3 F | SYSTOLIC BLOOD PRESSURE: 124 MMHG

## 2025-08-18 DIAGNOSIS — T14.8XXA OPEN WOUND: Primary | ICD-10-CM

## 2025-08-18 DIAGNOSIS — Z51.89 VISIT FOR WOUND CHECK: ICD-10-CM

## 2025-08-18 PROCEDURE — 99282 EMERGENCY DEPT VISIT SF MDM: CPT | Performed by: NURSE PRACTITIONER

## 2025-08-18 PROCEDURE — 99283 EMERGENCY DEPT VISIT LOW MDM: CPT

## 2025-08-18 ASSESSMENT — ACTIVITIES OF DAILY LIVING (ADL)
ADLS_ACUITY_SCORE: 41

## 2025-08-19 ENCOUNTER — NURSE TRIAGE (OUTPATIENT)
Dept: FAMILY MEDICINE | Facility: CLINIC | Age: 65
End: 2025-08-19

## 2025-08-19 ENCOUNTER — PATIENT OUTREACH (OUTPATIENT)
Dept: CARE COORDINATION | Facility: CLINIC | Age: 65
End: 2025-08-19
Payer: COMMERCIAL

## 2025-08-21 ENCOUNTER — PATIENT OUTREACH (OUTPATIENT)
Dept: NURSING | Facility: CLINIC | Age: 65
End: 2025-08-21
Payer: COMMERCIAL

## 2025-08-21 ASSESSMENT — ACTIVITIES OF DAILY LIVING (ADL): DEPENDENT_IADLS:: INDEPENDENT

## 2025-08-27 ENCOUNTER — OFFICE VISIT (OUTPATIENT)
Dept: FAMILY MEDICINE | Facility: CLINIC | Age: 65
End: 2025-08-27
Payer: COMMERCIAL

## 2025-08-27 VITALS
SYSTOLIC BLOOD PRESSURE: 94 MMHG | DIASTOLIC BLOOD PRESSURE: 60 MMHG | TEMPERATURE: 98 F | HEART RATE: 62 BPM | BODY MASS INDEX: 24.07 KG/M2 | HEIGHT: 55 IN | OXYGEN SATURATION: 98 % | RESPIRATION RATE: 16 BRPM | WEIGHT: 104 LBS

## 2025-08-27 DIAGNOSIS — S81.851D DOG BITE OF RIGHT LOWER LEG, SUBSEQUENT ENCOUNTER: Primary | ICD-10-CM
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
## 2025-08-27 DIAGNOSIS — M25.361 INSTABILITY OF RIGHT KNEE JOINT: ICD-10-CM

## 2025-08-27 PROCEDURE — 3078F DIAST BP <80 MM HG: CPT | Performed by: FAMILY MEDICINE

## 2025-08-27 PROCEDURE — 99213 OFFICE O/P EST LOW 20 MIN: CPT | Performed by: FAMILY MEDICINE

## 2025-08-27 PROCEDURE — 3074F SYST BP LT 130 MM HG: CPT | Performed by: FAMILY MEDICINE

## 2025-08-27 PROCEDURE — 1126F AMNT PAIN NOTED NONE PRSNT: CPT | Performed by: FAMILY MEDICINE

## 2025-08-27 ASSESSMENT — PAIN SCALES - GENERAL: PAINLEVEL_OUTOF10: NO PAIN (0)

## 2025-08-28 ENCOUNTER — PATIENT OUTREACH (OUTPATIENT)
Dept: CARE COORDINATION | Facility: CLINIC | Age: 65
End: 2025-08-28
Payer: COMMERCIAL

## 2025-09-01 ENCOUNTER — PATIENT OUTREACH (OUTPATIENT)
Dept: CARE COORDINATION | Facility: CLINIC | Age: 65
End: 2025-09-01
Payer: COMMERCIAL

## 2025-09-04 ENCOUNTER — NURSE TRIAGE (OUTPATIENT)
Dept: FAMILY MEDICINE | Facility: CLINIC | Age: 65
End: 2025-09-04
Payer: COMMERCIAL

## (undated) DEVICE — ENDO SNARE EXACTO COLD 9MM LOOP 2.4MMX230CM 00711115

## (undated) DEVICE — ENDO FORCEP ENDOJAW BIOPSY 2.8MMX230CM FB-220U

## (undated) RX ORDER — PROPOFOL 10 MG/ML
INJECTION, EMULSION INTRAVENOUS
Status: DISPENSED
Start: 2022-06-24

## (undated) RX ORDER — GLYCOPYRROLATE 0.2 MG/ML
INJECTION, SOLUTION INTRAMUSCULAR; INTRAVENOUS
Status: DISPENSED
Start: 2022-06-24